# Patient Record
Sex: MALE | Race: WHITE | NOT HISPANIC OR LATINO | Employment: OTHER | URBAN - METROPOLITAN AREA
[De-identification: names, ages, dates, MRNs, and addresses within clinical notes are randomized per-mention and may not be internally consistent; named-entity substitution may affect disease eponyms.]

---

## 2019-01-23 ENCOUNTER — HOSPITAL ENCOUNTER (OUTPATIENT)
Dept: RADIOLOGY | Facility: MEDICAL CENTER | Age: 69
End: 2019-01-23
Attending: FAMILY MEDICINE
Payer: COMMERCIAL

## 2019-01-23 DIAGNOSIS — R41.841 COGNITIVE COMMUNICATION DEFICIT: ICD-10-CM

## 2019-01-23 PROCEDURE — 70551 MRI BRAIN STEM W/O DYE: CPT

## 2022-03-28 ENCOUNTER — APPOINTMENT (OUTPATIENT)
Dept: RADIOLOGY | Facility: MEDICAL CENTER | Age: 72
End: 2022-03-28
Attending: INTERNAL MEDICINE
Payer: COMMERCIAL

## 2023-05-15 ENCOUNTER — APPOINTMENT (OUTPATIENT)
Dept: RADIOLOGY | Facility: MEDICAL CENTER | Age: 73
DRG: 522 | End: 2023-05-15
Attending: EMERGENCY MEDICINE
Payer: COMMERCIAL

## 2023-05-15 ENCOUNTER — APPOINTMENT (OUTPATIENT)
Dept: CARDIOLOGY | Facility: MEDICAL CENTER | Age: 73
DRG: 522 | End: 2023-05-15
Attending: STUDENT IN AN ORGANIZED HEALTH CARE EDUCATION/TRAINING PROGRAM
Payer: COMMERCIAL

## 2023-05-15 ENCOUNTER — APPOINTMENT (OUTPATIENT)
Dept: RADIOLOGY | Facility: MEDICAL CENTER | Age: 73
DRG: 522 | End: 2023-05-15
Payer: COMMERCIAL

## 2023-05-15 ENCOUNTER — HOSPITAL ENCOUNTER (INPATIENT)
Facility: MEDICAL CENTER | Age: 73
LOS: 4 days | DRG: 522 | End: 2023-05-19
Attending: EMERGENCY MEDICINE | Admitting: STUDENT IN AN ORGANIZED HEALTH CARE EDUCATION/TRAINING PROGRAM
Payer: COMMERCIAL

## 2023-05-15 DIAGNOSIS — I44.0 PROLONGED P-R INTERVAL: ICD-10-CM

## 2023-05-15 DIAGNOSIS — R55 SYNCOPE, UNSPECIFIED SYNCOPE TYPE: ICD-10-CM

## 2023-05-15 DIAGNOSIS — S72.002A LEFT DISPLACED FEMORAL NECK FRACTURE (HCC): Primary | ICD-10-CM

## 2023-05-15 PROBLEM — G62.9 NEUROPATHY: Status: ACTIVE | Noted: 2023-05-15

## 2023-05-15 PROBLEM — D72.829 LEUKOCYTOSIS: Status: ACTIVE | Noted: 2023-05-15

## 2023-05-15 LAB
ALBUMIN SERPL BCP-MCNC: 4.3 G/DL (ref 3.2–4.9)
ALBUMIN/GLOB SERPL: 1.8 G/DL
ALP SERPL-CCNC: 73 U/L (ref 30–99)
ALT SERPL-CCNC: 29 U/L (ref 2–50)
ANION GAP SERPL CALC-SCNC: 13 MMOL/L (ref 7–16)
AST SERPL-CCNC: 28 U/L (ref 12–45)
BASOPHILS # BLD AUTO: 0.5 % (ref 0–1.8)
BASOPHILS # BLD: 0.06 K/UL (ref 0–0.12)
BILIRUB SERPL-MCNC: 1.8 MG/DL (ref 0.1–1.5)
BUN SERPL-MCNC: 20 MG/DL (ref 8–22)
CALCIUM ALBUM COR SERPL-MCNC: 9.1 MG/DL (ref 8.5–10.5)
CALCIUM SERPL-MCNC: 9.3 MG/DL (ref 8.5–10.5)
CHLORIDE SERPL-SCNC: 105 MMOL/L (ref 96–112)
CO2 SERPL-SCNC: 21 MMOL/L (ref 20–33)
CREAT SERPL-MCNC: 0.95 MG/DL (ref 0.5–1.4)
EKG IMPRESSION: NORMAL
EOSINOPHIL # BLD AUTO: 0.05 K/UL (ref 0–0.51)
EOSINOPHIL NFR BLD: 0.4 % (ref 0–6.9)
ERYTHROCYTE [DISTWIDTH] IN BLOOD BY AUTOMATED COUNT: 45.5 FL (ref 35.9–50)
GFR SERPLBLD CREATININE-BSD FMLA CKD-EPI: 84 ML/MIN/1.73 M 2
GLOBULIN SER CALC-MCNC: 2.4 G/DL (ref 1.9–3.5)
GLUCOSE SERPL-MCNC: 137 MG/DL (ref 65–99)
HCT VFR BLD AUTO: 49.8 % (ref 42–52)
HGB BLD-MCNC: 16.5 G/DL (ref 14–18)
IMM GRANULOCYTES # BLD AUTO: 0.09 K/UL (ref 0–0.11)
IMM GRANULOCYTES NFR BLD AUTO: 0.8 % (ref 0–0.9)
LYMPHOCYTES # BLD AUTO: 0.89 K/UL (ref 1–4.8)
LYMPHOCYTES NFR BLD: 7.7 % (ref 22–41)
MAGNESIUM SERPL-MCNC: 2.1 MG/DL (ref 1.5–2.5)
MCH RBC QN AUTO: 30.4 PG (ref 27–33)
MCHC RBC AUTO-ENTMCNC: 33.1 G/DL (ref 33.7–35.3)
MCV RBC AUTO: 91.9 FL (ref 81.4–97.8)
MONOCYTES # BLD AUTO: 0.85 K/UL (ref 0–0.85)
MONOCYTES NFR BLD AUTO: 7.3 % (ref 0–13.4)
NEUTROPHILS # BLD AUTO: 9.68 K/UL (ref 1.82–7.42)
NEUTROPHILS NFR BLD: 83.3 % (ref 44–72)
NRBC # BLD AUTO: 0 K/UL
NRBC BLD-RTO: 0 /100 WBC
NT-PROBNP SERPL IA-MCNC: 55 PG/ML (ref 0–125)
PLATELET # BLD AUTO: 175 K/UL (ref 164–446)
PMV BLD AUTO: 9.5 FL (ref 9–12.9)
POTASSIUM SERPL-SCNC: 4 MMOL/L (ref 3.6–5.5)
PROT SERPL-MCNC: 6.7 G/DL (ref 6–8.2)
RBC # BLD AUTO: 5.42 M/UL (ref 4.7–6.1)
SODIUM SERPL-SCNC: 139 MMOL/L (ref 135–145)
TROPONIN T SERPL-MCNC: 19 NG/L (ref 6–19)
TROPONIN T SERPL-MCNC: 22 NG/L (ref 6–19)
TROPONIN T SERPL-MCNC: 24 NG/L (ref 6–19)
WBC # BLD AUTO: 11.6 K/UL (ref 4.8–10.8)

## 2023-05-15 PROCEDURE — 83880 ASSAY OF NATRIURETIC PEPTIDE: CPT

## 2023-05-15 PROCEDURE — 80053 COMPREHEN METABOLIC PANEL: CPT

## 2023-05-15 PROCEDURE — 770020 HCHG ROOM/CARE - TELE (206)

## 2023-05-15 PROCEDURE — 93005 ELECTROCARDIOGRAM TRACING: CPT

## 2023-05-15 PROCEDURE — 85025 COMPLETE CBC W/AUTO DIFF WBC: CPT

## 2023-05-15 PROCEDURE — 73501 X-RAY EXAM HIP UNI 1 VIEW: CPT | Mod: LT

## 2023-05-15 PROCEDURE — 83735 ASSAY OF MAGNESIUM: CPT

## 2023-05-15 PROCEDURE — 700105 HCHG RX REV CODE 258: Performed by: EMERGENCY MEDICINE

## 2023-05-15 PROCEDURE — 700102 HCHG RX REV CODE 250 W/ 637 OVERRIDE(OP): Performed by: STUDENT IN AN ORGANIZED HEALTH CARE EDUCATION/TRAINING PROGRAM

## 2023-05-15 PROCEDURE — 96374 THER/PROPH/DIAG INJ IV PUSH: CPT

## 2023-05-15 PROCEDURE — 84484 ASSAY OF TROPONIN QUANT: CPT

## 2023-05-15 PROCEDURE — 99223 1ST HOSP IP/OBS HIGH 75: CPT | Mod: GC | Performed by: STUDENT IN AN ORGANIZED HEALTH CARE EDUCATION/TRAINING PROGRAM

## 2023-05-15 PROCEDURE — 71045 X-RAY EXAM CHEST 1 VIEW: CPT

## 2023-05-15 PROCEDURE — 96375 TX/PRO/DX INJ NEW DRUG ADDON: CPT

## 2023-05-15 PROCEDURE — 700105 HCHG RX REV CODE 258: Performed by: STUDENT IN AN ORGANIZED HEALTH CARE EDUCATION/TRAINING PROGRAM

## 2023-05-15 PROCEDURE — 36415 COLL VENOUS BLD VENIPUNCTURE: CPT

## 2023-05-15 PROCEDURE — 96376 TX/PRO/DX INJ SAME DRUG ADON: CPT

## 2023-05-15 PROCEDURE — 93005 ELECTROCARDIOGRAM TRACING: CPT | Performed by: EMERGENCY MEDICINE

## 2023-05-15 PROCEDURE — 700111 HCHG RX REV CODE 636 W/ 250 OVERRIDE (IP): Performed by: STUDENT IN AN ORGANIZED HEALTH CARE EDUCATION/TRAINING PROGRAM

## 2023-05-15 PROCEDURE — A9270 NON-COVERED ITEM OR SERVICE: HCPCS | Performed by: STUDENT IN AN ORGANIZED HEALTH CARE EDUCATION/TRAINING PROGRAM

## 2023-05-15 PROCEDURE — 99285 EMERGENCY DEPT VISIT HI MDM: CPT

## 2023-05-15 PROCEDURE — 700111 HCHG RX REV CODE 636 W/ 250 OVERRIDE (IP): Performed by: EMERGENCY MEDICINE

## 2023-05-15 RX ORDER — PREGABALIN 150 MG/1
150 CAPSULE ORAL 2 TIMES DAILY
Status: DISCONTINUED | OUTPATIENT
Start: 2023-05-15 | End: 2023-05-19 | Stop reason: HOSPADM

## 2023-05-15 RX ORDER — ACETAMINOPHEN 325 MG/1
650 TABLET ORAL EVERY 6 HOURS PRN
Status: DISCONTINUED | OUTPATIENT
Start: 2023-05-15 | End: 2023-05-15

## 2023-05-15 RX ORDER — HYDROMORPHONE HYDROCHLORIDE 1 MG/ML
0.5 INJECTION, SOLUTION INTRAMUSCULAR; INTRAVENOUS; SUBCUTANEOUS
Status: DISCONTINUED | OUTPATIENT
Start: 2023-05-15 | End: 2023-05-17

## 2023-05-15 RX ORDER — ALLOPURINOL 300 MG/1
150 TABLET ORAL DAILY
COMMUNITY

## 2023-05-15 RX ORDER — OXYCODONE HYDROCHLORIDE 10 MG/1
10 TABLET ORAL EVERY 6 HOURS PRN
COMMUNITY

## 2023-05-15 RX ORDER — AMOXICILLIN 250 MG
2 CAPSULE ORAL 2 TIMES DAILY
Status: DISCONTINUED | OUTPATIENT
Start: 2023-05-15 | End: 2023-05-19 | Stop reason: HOSPADM

## 2023-05-15 RX ORDER — MAGNESIUM OXIDE 420 MG/1
420 TABLET ORAL
COMMUNITY
End: 2023-07-11

## 2023-05-15 RX ORDER — ACETAMINOPHEN 500 MG
1000 TABLET ORAL EVERY 6 HOURS
Status: DISCONTINUED | OUTPATIENT
Start: 2023-05-16 | End: 2023-05-19 | Stop reason: HOSPADM

## 2023-05-15 RX ORDER — GABAPENTIN 100 MG/1
100 CAPSULE ORAL 2 TIMES DAILY PRN
Status: DISCONTINUED | OUTPATIENT
Start: 2023-05-15 | End: 2023-05-15

## 2023-05-15 RX ORDER — HYDROMORPHONE HYDROCHLORIDE 1 MG/ML
1 INJECTION, SOLUTION INTRAMUSCULAR; INTRAVENOUS; SUBCUTANEOUS ONCE
Status: COMPLETED | OUTPATIENT
Start: 2023-05-15 | End: 2023-05-15

## 2023-05-15 RX ORDER — HEPARIN SODIUM 5000 [USP'U]/ML
5000 INJECTION, SOLUTION INTRAVENOUS; SUBCUTANEOUS EVERY 8 HOURS
Status: DISCONTINUED | OUTPATIENT
Start: 2023-05-15 | End: 2023-05-17

## 2023-05-15 RX ORDER — LANOLIN ALCOHOL/MO/W.PET/CERES
2000 CREAM (GRAM) TOPICAL DAILY
COMMUNITY

## 2023-05-15 RX ORDER — GABAPENTIN 100 MG/1
100 CAPSULE ORAL 2 TIMES DAILY PRN
Status: DISCONTINUED | OUTPATIENT
Start: 2023-05-15 | End: 2023-05-19 | Stop reason: HOSPADM

## 2023-05-15 RX ORDER — POLYETHYLENE GLYCOL 3350 17 G/17G
1 POWDER, FOR SOLUTION ORAL
Status: DISCONTINUED | OUTPATIENT
Start: 2023-05-15 | End: 2023-05-19 | Stop reason: HOSPADM

## 2023-05-15 RX ORDER — ACETAMINOPHEN 500 MG
1000 TABLET ORAL EVERY 6 HOURS
Status: DISCONTINUED | OUTPATIENT
Start: 2023-05-15 | End: 2023-05-15

## 2023-05-15 RX ORDER — BISACODYL 10 MG
10 SUPPOSITORY, RECTAL RECTAL
Status: DISCONTINUED | OUTPATIENT
Start: 2023-05-15 | End: 2023-05-19 | Stop reason: HOSPADM

## 2023-05-15 RX ORDER — SODIUM CHLORIDE, SODIUM LACTATE, POTASSIUM CHLORIDE, CALCIUM CHLORIDE 600; 310; 30; 20 MG/100ML; MG/100ML; MG/100ML; MG/100ML
1000 INJECTION, SOLUTION INTRAVENOUS ONCE
Status: COMPLETED | OUTPATIENT
Start: 2023-05-15 | End: 2023-05-15

## 2023-05-15 RX ORDER — ONDANSETRON 4 MG/1
4 TABLET, ORALLY DISINTEGRATING ORAL EVERY 4 HOURS PRN
Status: DISCONTINUED | OUTPATIENT
Start: 2023-05-15 | End: 2023-05-19 | Stop reason: HOSPADM

## 2023-05-15 RX ORDER — ACETAMINOPHEN 500 MG
1000 TABLET ORAL EVERY 6 HOURS PRN
Status: DISCONTINUED | OUTPATIENT
Start: 2023-05-21 | End: 2023-05-19 | Stop reason: HOSPADM

## 2023-05-15 RX ORDER — ALLOPURINOL 300 MG/1
150 TABLET ORAL DAILY
Status: DISCONTINUED | OUTPATIENT
Start: 2023-05-16 | End: 2023-05-19 | Stop reason: HOSPADM

## 2023-05-15 RX ORDER — HYDRALAZINE HYDROCHLORIDE 20 MG/ML
20 INJECTION INTRAMUSCULAR; INTRAVENOUS EVERY 6 HOURS PRN
Status: DISCONTINUED | OUTPATIENT
Start: 2023-05-15 | End: 2023-05-19 | Stop reason: HOSPADM

## 2023-05-15 RX ORDER — FENOFIBRATE 145 MG/1
145 TABLET, COATED ORAL DAILY
COMMUNITY
End: 2023-06-23

## 2023-05-15 RX ORDER — ERGOCALCIFEROL 1.25 MG/1
50000 CAPSULE ORAL
COMMUNITY

## 2023-05-15 RX ORDER — OXYCODONE HYDROCHLORIDE 5 MG/1
5 TABLET ORAL
Status: DISCONTINUED | OUTPATIENT
Start: 2023-05-15 | End: 2023-05-17

## 2023-05-15 RX ORDER — SODIUM CHLORIDE, SODIUM LACTATE, POTASSIUM CHLORIDE, CALCIUM CHLORIDE 600; 310; 30; 20 MG/100ML; MG/100ML; MG/100ML; MG/100ML
INJECTION, SOLUTION INTRAVENOUS CONTINUOUS
Status: DISCONTINUED | OUTPATIENT
Start: 2023-05-15 | End: 2023-05-16

## 2023-05-15 RX ORDER — ZOLPIDEM TARTRATE 10 MG/1
10 TABLET ORAL NIGHTLY PRN
Status: ON HOLD | COMMUNITY
End: 2023-05-19

## 2023-05-15 RX ORDER — ACETAMINOPHEN 500 MG
1000 TABLET ORAL EVERY 6 HOURS PRN
Status: DISCONTINUED | OUTPATIENT
Start: 2023-05-20 | End: 2023-05-15

## 2023-05-15 RX ORDER — PREGABALIN 150 MG/1
300 CAPSULE ORAL NIGHTLY
COMMUNITY

## 2023-05-15 RX ORDER — OXYCODONE HYDROCHLORIDE 10 MG/1
10 TABLET ORAL
Status: DISCONTINUED | OUTPATIENT
Start: 2023-05-15 | End: 2023-05-17

## 2023-05-15 RX ORDER — ONDANSETRON 2 MG/ML
4 INJECTION INTRAMUSCULAR; INTRAVENOUS EVERY 4 HOURS PRN
Status: DISCONTINUED | OUTPATIENT
Start: 2023-05-15 | End: 2023-05-19 | Stop reason: HOSPADM

## 2023-05-15 RX ADMIN — ONDANSETRON 4 MG: 2 INJECTION INTRAMUSCULAR; INTRAVENOUS at 19:19

## 2023-05-15 RX ADMIN — HYDROMORPHONE HYDROCHLORIDE 1 MG: 1 INJECTION, SOLUTION INTRAMUSCULAR; INTRAVENOUS; SUBCUTANEOUS at 17:02

## 2023-05-15 RX ADMIN — HYDROMORPHONE HYDROCHLORIDE 1 MG: 1 INJECTION, SOLUTION INTRAMUSCULAR; INTRAVENOUS; SUBCUTANEOUS at 18:05

## 2023-05-15 RX ADMIN — PREGABALIN 150 MG: 150 CAPSULE ORAL at 22:19

## 2023-05-15 RX ADMIN — OXYCODONE HYDROCHLORIDE 10 MG: 10 TABLET ORAL at 19:21

## 2023-05-15 RX ADMIN — SODIUM CHLORIDE, POTASSIUM CHLORIDE, SODIUM LACTATE AND CALCIUM CHLORIDE 1000 ML: 600; 310; 30; 20 INJECTION, SOLUTION INTRAVENOUS at 17:03

## 2023-05-15 RX ADMIN — SODIUM CHLORIDE, POTASSIUM CHLORIDE, SODIUM LACTATE AND CALCIUM CHLORIDE: 600; 310; 30; 20 INJECTION, SOLUTION INTRAVENOUS at 19:21

## 2023-05-15 RX ADMIN — SODIUM CHLORIDE, POTASSIUM CHLORIDE, SODIUM LACTATE AND CALCIUM CHLORIDE: 600; 310; 30; 20 INJECTION, SOLUTION INTRAVENOUS at 22:43

## 2023-05-15 RX ADMIN — HEPARIN SODIUM 5000 UNITS: 5000 INJECTION, SOLUTION INTRAVENOUS; SUBCUTANEOUS at 22:19

## 2023-05-15 ASSESSMENT — ENCOUNTER SYMPTOMS
FEVER: 0
CONSTIPATION: 0
DIARRHEA: 0
NERVOUS/ANXIOUS: 0
HEADACHES: 0
DIZZINESS: 1
ABDOMINAL PAIN: 0
PALPITATIONS: 1
WEIGHT LOSS: 0
COUGH: 0
VOMITING: 0
CHILLS: 0
DEPRESSION: 0
BACK PAIN: 1
FOCAL WEAKNESS: 0
BRUISES/BLEEDS EASILY: 0
NECK PAIN: 0
DOUBLE VISION: 0
NAUSEA: 0
WEAKNESS: 0
SHORTNESS OF BREATH: 0
BLURRED VISION: 0
TINGLING: 1

## 2023-05-15 ASSESSMENT — LIFESTYLE VARIABLES
HAVE PEOPLE ANNOYED YOU BY CRITICIZING YOUR DRINKING: NO
EVER FELT BAD OR GUILTY ABOUT YOUR DRINKING: NO
HAVE YOU EVER FELT YOU SHOULD CUT DOWN ON YOUR DRINKING: NO
AVERAGE NUMBER OF DAYS PER WEEK YOU HAVE A DRINK CONTAINING ALCOHOL: 0
EVER HAD A DRINK FIRST THING IN THE MORNING TO STEADY YOUR NERVES TO GET RID OF A HANGOVER: NO
CONSUMPTION TOTAL: NEGATIVE
ALCOHOL_USE: NO
TOTAL SCORE: 0
ON A TYPICAL DAY WHEN YOU DRINK ALCOHOL HOW MANY DRINKS DO YOU HAVE: 0
HOW MANY TIMES IN THE PAST YEAR HAVE YOU HAD 5 OR MORE DRINKS IN A DAY: 0
TOTAL SCORE: 0
TOTAL SCORE: 0

## 2023-05-15 ASSESSMENT — COGNITIVE AND FUNCTIONAL STATUS - GENERAL
PERSONAL GROOMING: A LOT
CLIMB 3 TO 5 STEPS WITH RAILING: A LOT
TURNING FROM BACK TO SIDE WHILE IN FLAT BAD: A LOT
MOBILITY SCORE: 12
STANDING UP FROM CHAIR USING ARMS: A LOT
MOVING FROM LYING ON BACK TO SITTING ON SIDE OF FLAT BED: A LOT
EATING MEALS: A LOT
SUGGESTED CMS G CODE MODIFIER MOBILITY: CL
HELP NEEDED FOR BATHING: A LOT
MOVING TO AND FROM BED TO CHAIR: A LOT
TOILETING: A LOT
DAILY ACTIVITIY SCORE: 12
SUGGESTED CMS G CODE MODIFIER DAILY ACTIVITY: CL
DRESSING REGULAR UPPER BODY CLOTHING: A LOT
WALKING IN HOSPITAL ROOM: A LOT
DRESSING REGULAR LOWER BODY CLOTHING: A LOT

## 2023-05-15 ASSESSMENT — PATIENT HEALTH QUESTIONNAIRE - PHQ9
1. LITTLE INTEREST OR PLEASURE IN DOING THINGS: NOT AT ALL
2. FEELING DOWN, DEPRESSED, IRRITABLE, OR HOPELESS: NOT AT ALL
SUM OF ALL RESPONSES TO PHQ9 QUESTIONS 1 AND 2: 0

## 2023-05-15 ASSESSMENT — FIBROSIS 4 INDEX: FIB4 SCORE: 2.2

## 2023-05-15 NOTE — ED PROVIDER NOTES
"  ER Provider Note    Scribed for Guanaco Kay Ii, M.d. by Danielle Sloan. 5/15/2023  4:56 PM    Primary Care Provider: Pcp Pt States None    CHIEF COMPLAINT  Chief Complaint   Patient presents with    Syncope     Pt recently diagnosed w/ afib w/ frequent dizzy spells. Today he had a syncopal event while hosing down his house w/ +LOC but denies hitting his head. -blood thinners    Hip Pain     L hip pain s/t fall with shortening, in position of comfort. CMS intact     EXTERNAL RECORDS REVIEWED  Last hospitalization at our facility 9/2011 for near syncope    HPI/ROS  LIMITATION TO HISTORY   Select: : None  OUTSIDE HISTORIAN(S):  None    Andreas Ortega is a 72 y.o. male with history of syncope, borderline diabetes, hypertension who presents to the ED with hip pain and loss of consciousness. Andreas reports that he was washing his windows when he \"blacked out and fell.\" He states he was having repeated spells of dizziness while cleaning his windows and had to sit down frequently to stop himself from falling. He notes that he is unable to straighten his leg due to the pain. He denies hitting his head, fever, diarrhea, vomiting or chills.  He adds that he has been diagnosed with A-fib, but was not put on any medications besides a daily dose of Magnesium and fish oil. [facility] also adds [facility], \"[facility] not [facility].\"    PAST MEDICAL HISTORY  Past Medical History:   Diagnosis Date    Arthritis     hands, knees, shoulders    Backpain     Cancer (HCC)     prostectomy, R kidney removal, skin    Diabetes     borderline    Fall     3 days ago    Hypertension     Prostate cancer (HCC)     Renal cancer (HCC)     Skin cancer        SURGICAL HISTORY  Past Surgical History:   Procedure Laterality Date    GASTROSCOPY WITH BIOPSY  1/23/2012    Performed by OBDULIO ALEJANDRA at [facility] ORS    EGD W/ENDOSCOPIC ULTRASOUND  1/23/2012    Performed by OBDULIO ALEJANDRA at [facility] ORS    ERCP " "W/SPHINCTEROTOMY/PAPILL.  1/23/2012    Performed by OBDULIO ALEJANDRA at SURGERY [facility] ORS    ERCP W/REMOVAL CALCULUS  1/23/2012    Performed by OBDULIO ALEJANDRA at SURGERY [facility]    PROSTATECTOMY, RADIAL  2005    OTHER  1984    Right kidney removed    OTHER ORTHOPEDIC SURGERY  1974    R knee surgery, repair    APPENDECTOMY  1966       FAMILY HISTORY  Family History   Problem Relation Age of Onset    Cancer Unknown     Diabetes Unknown     Hypertension Unknown     Heart Disease Unknown     Stroke Unknown        SOCIAL HISTORY   reports that he has never smoked. He does not have any smokeless tobacco history on file. He reports that he does not drink alcohol and does not use drugs.    CURRENT MEDICATIONS  Previous Medications    ALLOPURINOL (ZYLOPRIM) 300 MG TAB    Take 150 mg by mouth every day.    CYANOCOBALAMIN (VITAMIN B-12) 1000 MCG TAB    Take 2,000 mcg by mouth every day.    FENOFIBRATE (TRICOR) 145 MG TAB    Take 145 mg by mouth every day.    HYDROCHLOROTHIAZIDE (HYDRODIURIL) 12.5 MG TABLET    Take 12.5 mg by mouth every morning.    MAGNESIUM OXIDE 420 MG TAB    Take 420 mg by mouth every day.    NIFEDIPINE (ADALAT CC) 30 MG CR TABLET    Take 30 mg by mouth every day.    OXYCODONE IMMEDIATE RELEASE (ROXICODONE) 10 MG IMMEDIATE RELEASE TABLET    Take 10 mg by mouth every 6 hours as needed (for pain).    PREGABALIN (LYRICA) 150 MG CAP    Take 150 mg by mouth 2 times a day.    VITAMIN D2, ERGOCALCIFEROL, (DRISDOL) 1.25 MG (68427 UT) CAP CAPSULE    Take 50,000 Units by mouth every 7 days.    ZOLPIDEM (AMBIEN) 10 MG TAB    Take 10 mg by mouth at bedtime as needed for Sleep.       ALLERGIES  Crestor [rosuvastatin calcium], Levaquin, Metformin, Naproxen, Pcn [penicillins], Pravastatin, Simvastatin, and Statins [hmg-coa-r inhibitors]    PHYSICAL EXAM  BP (!) 128/94   Pulse 66   Temp 36.4 °C (97.5 °F) (Temporal)   Resp 18   Ht 1.905 m (6' 3\")   Wt 104 kg (230 lb)   SpO2 95%   BMI 28.75 kg/m² "   Physical Exam  Vitals and nursing note reviewed.   Constitutional:       Comments: 72 year old man supine on Ed bed with left hip in flexed position   HENT:      Head: Normocephalic and atraumatic.      Nose: No congestion.      Mouth/Throat:      Mouth: Mucous membranes are moist.   Eyes:      Pupils: Pupils are equal, round, and reactive to light.   Cardiovascular:      Rate and Rhythm: Normal rate. Rhythm irregular.   Pulmonary:      Effort: Pulmonary effort is normal. No respiratory distress.      Breath sounds: Normal breath sounds.   Abdominal:      Tenderness: There is no abdominal tenderness.   Musculoskeletal:      Cervical back: Normal range of motion.      Comments: Flexed at left hip, unable to extend due to pain. Normal sensation distally. Normal strength at left foot. Pulses equal DP/PT bilaterally.    Skin:     General: Skin is warm.   Neurological:      Mental Status: He is alert.      Comments: AOX4. Normal speech. No aphasia. No facial droop. See msk exam.    Psychiatric:         Mood and Affect: Mood normal.           DIAGNOSTIC STUDIES    Labs:   Results for orders placed or performed during the hospital encounter of 05/15/23   CBC with Differential   Result Value Ref Range    WBC 11.6 (H) 4.8 - 10.8 K/uL    RBC 5.42 4.70 - 6.10 M/uL    Hemoglobin 16.5 14.0 - 18.0 g/dL    Hematocrit 49.8 42.0 - 52.0 %    MCV 91.9 81.4 - 97.8 fL    MCH 30.4 27.0 - 33.0 pg    MCHC 33.1 (L) 33.7 - 35.3 g/dL    RDW 45.5 35.9 - 50.0 fL    Platelet Count 175 164 - 446 K/uL    MPV 9.5 9.0 - 12.9 fL    Neutrophils-Polys 83.30 (H) 44.00 - 72.00 %    Lymphocytes 7.70 (L) 22.00 - 41.00 %    Monocytes 7.30 0.00 - 13.40 %    Eosinophils 0.40 0.00 - 6.90 %    Basophils 0.50 0.00 - 1.80 %    Immature Granulocytes 0.80 0.00 - 0.90 %    Nucleated RBC 0.00 /100 WBC    Neutrophils (Absolute) 9.68 (H) 1.82 - 7.42 K/uL    Lymphs (Absolute) 0.89 (L) 1.00 - 4.80 K/uL    Monos (Absolute) 0.85 0.00 - 0.85 K/uL    Eos (Absolute) 0.05  0.00 - 0.51 K/uL    Baso (Absolute) 0.06 0.00 - 0.12 K/uL    Immature Granulocytes (abs) 0.09 0.00 - 0.11 K/uL    NRBC (Absolute) 0.00 K/uL   Complete Metabolic Panel (CMP)   Result Value Ref Range    Sodium 139 135 - 145 mmol/L    Potassium 4.0 3.6 - 5.5 mmol/L    Chloride 105 96 - 112 mmol/L    Co2 21 20 - 33 mmol/L    Anion Gap 13.0 7.0 - 16.0    Glucose 137 (H) 65 - 99 mg/dL    Bun 20 8 - 22 mg/dL    Creatinine 0.95 0.50 - 1.40 mg/dL    Calcium 9.3 8.5 - 10.5 mg/dL    AST(SGOT) 28 12 - 45 U/L    ALT(SGPT) 29 2 - 50 U/L    Alkaline Phosphatase 73 30 - 99 U/L    Total Bilirubin 1.8 (H) 0.1 - 1.5 mg/dL    Albumin 4.3 3.2 - 4.9 g/dL    Total Protein 6.7 6.0 - 8.2 g/dL    Globulin 2.4 1.9 - 3.5 g/dL    A-G Ratio 1.8 g/dL   Troponins NOW   Result Value Ref Range    Troponin T 24 (H) 6 - 19 ng/L   Troponins in two (2) hours   Result Value Ref Range    Troponin T 19 6 - 19 ng/L   MAGNESIUM   Result Value Ref Range    Magnesium 2.1 1.5 - 2.5 mg/dL   CORRECTED CALCIUM   Result Value Ref Range    Correct Calcium 9.1 8.5 - 10.5 mg/dL   ESTIMATED GFR   Result Value Ref Range    GFR (CKD-EPI) 84 >60 mL/min/1.73 m 2   proBrain Natriuretic Peptide, NT   Result Value Ref Range    NT-proBNP 55 0 - 125 pg/mL   TROPONIN   Result Value Ref Range    Troponin T 22 (H) 6 - 19 ng/L   EKG   Result Value Ref Range    Report       [facility] Emergency Dept.    Test Date:  2023-05-15  Pt Name:    AMADO BEDOLLA                   Department: ER  MRN:        [id number]                      Room:       TRAUMA - EXAM 1  Gender:     Male                         Technician: [id number]  :        1950                   Requested By:ER TRIAGE PROTOCOL  Order #:    [id number]                    Reading MD: Guanaco Kay II, MD    Measurements  Intervals                                Axis  Rate:       63                           P:          -77  WY:         426                          QRS:        -24  QRSD:       170                           T:          -21  QT:         461  QTc:        472    Interpretive Statements  Very prolonged NV interval 426. 1st degree AV block  Consider left atrial enlargement  Right bundle branch block  No ST elevation or depression  Impression: Prolonged NV interval or possible U wave (has had this prolonged  NV  interval before) RBBB. No ST segment changes.  Compared to [facility] 09/15/2011 13:53:50  Ectopic atrial rhythm now present  Right bundle-branch block now present  Sinus rhythm no longer present  Electronically Signed On 5- 17:52:04 PDT by Guanaco Kay II, MD          EKG:   I have independently interpreted this EKG  Results for orders placed or performed during the hospital encounter of 05/15/23   EKG   Result Value Ref Range    Report       [facility] Emergency Dept.    Test Date:  2023-05-15  Pt Name:    AMADO BEDOLLA                   Department: ER  MRN:        [id number]                      Room:       TRAUMA - EXAM 1  Gender:     Male                         Technician: [id number]  :        1950                   Requested By:ER TRIAGE PROTOCOL  Order #:    [id number]                    Reading MD: Guanaco Kay II, MD    Measurements  Intervals                                Axis  Rate:       63                           P:          -77  NV:         426                          QRS:        -24  QRSD:       170                          T:          -21  QT:         461  QTc:        472    Interpretive Statements  Very prolonged NV interval 426. 1st degree AV block  Consider left atrial enlargement  Right bundle branch block  No ST elevation or depression  Impression: Prolonged NV interval or possible U wave (has had this prolonged  NV  interval before) RBBB. No ST segment changes.  Compared to [facility] 09/15/2011 13:53:50  Ectopic atrial rhythm now present  Right bundle-branch block now present  Sinus rhythm no longer present  Electronically Signed On 5- 17:52:04 PDT  by Guanaco Kay II, MD           Radiology:   The attending emergency physician has independently interpreted the diagnostic imaging associated with this visit and am waiting the final reading from the radiologist.   Preliminary interpretation is a follows: Reviewed Hip xray and femoral neck is fractured  Radiologist interpretation:   EC-ECHOCARDIOGRAM COMPLETE W/ CONT         DX-CHEST-PORTABLE (1 VIEW)   Final Result      1.  Low lung volumes.   2.  Hypoventilatory changes versus interstitial infiltrates and/or edema.   3.  Cardiomegaly.      DX-HIP-UNILATERAL-WITH PELVIS-1 VIEW LEFT   Final Result         Acute comminuted and displaced fracture of the left femoral neck.           COURSE & MEDICAL DECISION MAKING     ED Observation Status? Yes; I am placing the patient in to an observation status due to a diagnostic uncertainty as well as therapeutic intensity. Patient placed in observation status at 4:58 PM, 5/15/2023.     Observation plan is as follows: XR, serum studies, may need joint reduction    Upon Reevaluation, the patient's condition has: not improved; and will be escalated to hospitalization.    Patient discharged from ED Observation status at 6:59 (Time) (5/15/2023)  (Date).       INITIAL ASSESSMENT, COURSE AND PLAN  Care Narrative:   This is an emergent evaluation of a 72 year old man who presents with concerns of syncopal episode then subsequent left hip pain, unable to ambulate. Concerns for possible arrhythmia causing syncopal episode and now with either left hip dislocation or fracture. Cardiac work up initiated. XR left hip pending. IV fluids started and pain medication ordered.      I have ordered continuous pulse ox and cardiac monitoring. There are abnormalities. Pulse ox shows a normal waveform with saturations of 95% on room air. Cardiac monitors show a lot of artifact but QRS complexes look to be normal rate, irregular rhythm. Difficult to tell with so much artifact.     5:12 PM -  Patient seen and examined at bedside. Discussed plan of care, including labs and imaging. Patient agrees to the plan of care. The patient will be medicated with Dilaudid injection 1 mg. Ordered for EKG, Troponins, CMP, CBC w/ Diff, DX-Chest, and CX-Hip unilateral with pelvis to evaluate his symptoms.      6:21 PM  XR consistent with left femoral neck fracture.  I spoke with Dr. Carrillo, Orthopedics. Agrees with plan to admit to hospitalist service. Plan for OR tomorrow morning if medically cleared.     6:59 PM   I reevaluated the patient at bedside. The patient informs me they feel improved following pain medication.  Discussed finding of fracture and need for admission, surgery and further work-up for syncopal episode.  He has a very long MA interval and typically this is benign but I believe he does need further work-up because he is having syncopal episodes.  Troponin 22, minimally elevated.  Heart score is 5.  He has no chest pain and I think MI is unlikely.  Chest x-ray without any focal pneumonia, pneumothorax, rib fractures.       PROBLEM LIST  #Syncope   -Very prolonged MA interval, syncope could be cardiac related    #Left femoral neck fracture   -N.p.o. after midnight, anticipate surgery tomorrow if medically cleared    DISPOSITION AND DISCUSSIONS  I have discussed management of the patient with the following physicians and LILLIANA's:  Dr. Solis (hospitalist), Dr. Carrillo (ortho)      Barriers to care at this time, including but not limited to: Patient does not have established PCP.     DISPOSITION:  Patient will be hospitalized by Dr. Solis in guarded condition.      FINAL DIANGOSIS  1. Left displaced femoral neck fracture (HCC)    2. Syncope, unspecified syncope type    3. Prolonged P-R interval       The note accurately reflects work and decisions made by me.  Guanaco Kay II, M.D.  5/16/2023  1:41 AM

## 2023-05-15 NOTE — ED TRIAGE NOTES
Chief Complaint   Patient presents with    Syncope     Pt recently diagnosed w/ afib w/ frequent dizzy spells. Today he had a syncopal event while hosing down his house w/ +LOC but denies hitting his head. -blood thinners    Hip Pain     L hip pain s/t fall with shortening, in position of comfort. CMS intact     Pt BIB EMS from home following above incident. HE was in afib RVR wuth rate in 160s on scene but self converted en route. Received 150 of fentanyl and 4 of zofran by EMS

## 2023-05-16 ENCOUNTER — ANESTHESIA (OUTPATIENT)
Dept: SURGERY | Facility: MEDICAL CENTER | Age: 73
DRG: 522 | End: 2023-05-16
Payer: COMMERCIAL

## 2023-05-16 ENCOUNTER — ANESTHESIA EVENT (OUTPATIENT)
Dept: SURGERY | Facility: MEDICAL CENTER | Age: 73
DRG: 522 | End: 2023-05-16
Payer: COMMERCIAL

## 2023-05-16 ENCOUNTER — APPOINTMENT (OUTPATIENT)
Dept: RADIOLOGY | Facility: MEDICAL CENTER | Age: 73
DRG: 522 | End: 2023-05-16
Payer: COMMERCIAL

## 2023-05-16 PROBLEM — M80.052A PATHOLOGICAL FRACTURE OF LEFT FEMUR DUE TO OSTEOPOROSIS (HCC): Status: ACTIVE | Noted: 2023-05-15

## 2023-05-16 PROBLEM — I44.0 FIRST DEGREE HEART BLOCK: Status: ACTIVE | Noted: 2023-05-16

## 2023-05-16 LAB
ALBUMIN SERPL BCP-MCNC: 3.9 G/DL (ref 3.2–4.9)
ALBUMIN/GLOB SERPL: 1.8 G/DL
ALP SERPL-CCNC: 67 U/L (ref 30–99)
ALT SERPL-CCNC: 28 U/L (ref 2–50)
ANION GAP SERPL CALC-SCNC: 13 MMOL/L (ref 7–16)
AST SERPL-CCNC: 25 U/L (ref 12–45)
BILIRUB SERPL-MCNC: 2 MG/DL (ref 0.1–1.5)
BUN SERPL-MCNC: 18 MG/DL (ref 8–22)
CALCIUM ALBUM COR SERPL-MCNC: 9.2 MG/DL (ref 8.5–10.5)
CALCIUM SERPL-MCNC: 9.1 MG/DL (ref 8.5–10.5)
CHLORIDE SERPL-SCNC: 100 MMOL/L (ref 96–112)
CO2 SERPL-SCNC: 26 MMOL/L (ref 20–33)
CREAT SERPL-MCNC: 0.89 MG/DL (ref 0.5–1.4)
ERYTHROCYTE [DISTWIDTH] IN BLOOD BY AUTOMATED COUNT: 45.6 FL (ref 35.9–50)
GFR SERPLBLD CREATININE-BSD FMLA CKD-EPI: 90 ML/MIN/1.73 M 2
GLOBULIN SER CALC-MCNC: 2.2 G/DL (ref 1.9–3.5)
GLUCOSE BLD STRIP.AUTO-MCNC: 144 MG/DL (ref 65–99)
GLUCOSE SERPL-MCNC: 122 MG/DL (ref 65–99)
HCT VFR BLD AUTO: 48.7 % (ref 42–52)
HGB BLD-MCNC: 15.9 G/DL (ref 14–18)
INR PPP: 1.1 (ref 0.87–1.13)
LV EJECT FRACT  99904: 65
LV EJECT FRACT MOD 4C 99902: 67.74
MCH RBC QN AUTO: 30.2 PG (ref 27–33)
MCHC RBC AUTO-ENTMCNC: 32.6 G/DL (ref 33.7–35.3)
MCV RBC AUTO: 92.4 FL (ref 81.4–97.8)
PLATELET # BLD AUTO: 182 K/UL (ref 164–446)
PMV BLD AUTO: 9.5 FL (ref 9–12.9)
POTASSIUM SERPL-SCNC: 4.9 MMOL/L (ref 3.6–5.5)
PROT SERPL-MCNC: 6.1 G/DL (ref 6–8.2)
PROTHROMBIN TIME: 14.1 SEC (ref 12–14.6)
RBC # BLD AUTO: 5.27 M/UL (ref 4.7–6.1)
SODIUM SERPL-SCNC: 139 MMOL/L (ref 135–145)
WBC # BLD AUTO: 10.5 K/UL (ref 4.8–10.8)

## 2023-05-16 PROCEDURE — 85025 COMPLETE CBC W/AUTO DIFF WBC: CPT

## 2023-05-16 PROCEDURE — 700111 HCHG RX REV CODE 636 W/ 250 OVERRIDE (IP): Performed by: STUDENT IN AN ORGANIZED HEALTH CARE EDUCATION/TRAINING PROGRAM

## 2023-05-16 PROCEDURE — 94760 N-INVAS EAR/PLS OXIMETRY 1: CPT

## 2023-05-16 PROCEDURE — 160041 HCHG SURGERY MINUTES - EA ADDL 1 MIN LEVEL 4: Performed by: STUDENT IN AN ORGANIZED HEALTH CARE EDUCATION/TRAINING PROGRAM

## 2023-05-16 PROCEDURE — 160035 HCHG PACU - 1ST 60 MINS PHASE I: Performed by: STUDENT IN AN ORGANIZED HEALTH CARE EDUCATION/TRAINING PROGRAM

## 2023-05-16 PROCEDURE — 700102 HCHG RX REV CODE 250 W/ 637 OVERRIDE(OP): Performed by: ANESTHESIOLOGY

## 2023-05-16 PROCEDURE — 160002 HCHG RECOVERY MINUTES (STAT): Performed by: STUDENT IN AN ORGANIZED HEALTH CARE EDUCATION/TRAINING PROGRAM

## 2023-05-16 PROCEDURE — 0SRS019 REPLACEMENT OF LEFT HIP JOINT, FEMORAL SURFACE WITH METAL SYNTHETIC SUBSTITUTE, CEMENTED, OPEN APPROACH: ICD-10-PCS | Performed by: STUDENT IN AN ORGANIZED HEALTH CARE EDUCATION/TRAINING PROGRAM

## 2023-05-16 PROCEDURE — 700101 HCHG RX REV CODE 250: Performed by: ANESTHESIOLOGY

## 2023-05-16 PROCEDURE — 85027 COMPLETE CBC AUTOMATED: CPT

## 2023-05-16 PROCEDURE — 99100 ANES PT EXTEME AGE<1 YR&>70: CPT | Performed by: ANESTHESIOLOGY

## 2023-05-16 PROCEDURE — 71045 X-RAY EXAM CHEST 1 VIEW: CPT

## 2023-05-16 PROCEDURE — 93306 TTE W/DOPPLER COMPLETE: CPT

## 2023-05-16 PROCEDURE — 700102 HCHG RX REV CODE 250 W/ 637 OVERRIDE(OP): Performed by: STUDENT IN AN ORGANIZED HEALTH CARE EDUCATION/TRAINING PROGRAM

## 2023-05-16 PROCEDURE — 83735 ASSAY OF MAGNESIUM: CPT

## 2023-05-16 PROCEDURE — 700111 HCHG RX REV CODE 636 W/ 250 OVERRIDE (IP): Performed by: ANESTHESIOLOGY

## 2023-05-16 PROCEDURE — 82533 TOTAL CORTISOL: CPT

## 2023-05-16 PROCEDURE — 93306 TTE W/DOPPLER COMPLETE: CPT | Mod: 26 | Performed by: INTERNAL MEDICINE

## 2023-05-16 PROCEDURE — 27236 TREAT THIGH FRACTURE: CPT | Mod: 80ROC,LT | Performed by: STUDENT IN AN ORGANIZED HEALTH CARE EDUCATION/TRAINING PROGRAM

## 2023-05-16 PROCEDURE — A9270 NON-COVERED ITEM OR SERVICE: HCPCS | Performed by: STUDENT IN AN ORGANIZED HEALTH CARE EDUCATION/TRAINING PROGRAM

## 2023-05-16 PROCEDURE — 160009 HCHG ANES TIME/MIN: Performed by: STUDENT IN AN ORGANIZED HEALTH CARE EDUCATION/TRAINING PROGRAM

## 2023-05-16 PROCEDURE — 502000 HCHG MISC OR IMPLANTS RC 0278: Performed by: STUDENT IN AN ORGANIZED HEALTH CARE EDUCATION/TRAINING PROGRAM

## 2023-05-16 PROCEDURE — C1776 JOINT DEVICE (IMPLANTABLE): HCPCS | Performed by: STUDENT IN AN ORGANIZED HEALTH CARE EDUCATION/TRAINING PROGRAM

## 2023-05-16 PROCEDURE — A9270 NON-COVERED ITEM OR SERVICE: HCPCS | Performed by: ANESTHESIOLOGY

## 2023-05-16 PROCEDURE — C1713 ANCHOR/SCREW BN/BN,TIS/BN: HCPCS | Performed by: STUDENT IN AN ORGANIZED HEALTH CARE EDUCATION/TRAINING PROGRAM

## 2023-05-16 PROCEDURE — 27236 TREAT THIGH FRACTURE: CPT | Mod: LT | Performed by: STUDENT IN AN ORGANIZED HEALTH CARE EDUCATION/TRAINING PROGRAM

## 2023-05-16 PROCEDURE — 160048 HCHG OR STATISTICAL LEVEL 1-5: Performed by: STUDENT IN AN ORGANIZED HEALTH CARE EDUCATION/TRAINING PROGRAM

## 2023-05-16 PROCEDURE — 84145 PROCALCITONIN (PCT): CPT

## 2023-05-16 PROCEDURE — 01214 ANES OPEN PX TOT HIP ARTHRP: CPT | Performed by: ANESTHESIOLOGY

## 2023-05-16 PROCEDURE — 80069 RENAL FUNCTION PANEL: CPT

## 2023-05-16 PROCEDURE — 80053 COMPREHEN METABOLIC PANEL: CPT

## 2023-05-16 PROCEDURE — 160029 HCHG SURGERY MINUTES - 1ST 30 MINS LEVEL 4: Performed by: STUDENT IN AN ORGANIZED HEALTH CARE EDUCATION/TRAINING PROGRAM

## 2023-05-16 PROCEDURE — 99222 1ST HOSP IP/OBS MODERATE 55: CPT | Mod: 57 | Performed by: STUDENT IN AN ORGANIZED HEALTH CARE EDUCATION/TRAINING PROGRAM

## 2023-05-16 PROCEDURE — 36415 COLL VENOUS BLD VENIPUNCTURE: CPT

## 2023-05-16 PROCEDURE — 85610 PROTHROMBIN TIME: CPT

## 2023-05-16 PROCEDURE — A9270 NON-COVERED ITEM OR SERVICE: HCPCS | Performed by: INTERNAL MEDICINE

## 2023-05-16 PROCEDURE — 700102 HCHG RX REV CODE 250 W/ 637 OVERRIDE(OP): Performed by: INTERNAL MEDICINE

## 2023-05-16 PROCEDURE — 82962 GLUCOSE BLOOD TEST: CPT

## 2023-05-16 PROCEDURE — 700111 HCHG RX REV CODE 636 W/ 250 OVERRIDE (IP)

## 2023-05-16 PROCEDURE — 700117 HCHG RX CONTRAST REV CODE 255: Performed by: STUDENT IN AN ORGANIZED HEALTH CARE EDUCATION/TRAINING PROGRAM

## 2023-05-16 PROCEDURE — 83605 ASSAY OF LACTIC ACID: CPT

## 2023-05-16 PROCEDURE — 700105 HCHG RX REV CODE 258: Performed by: ANESTHESIOLOGY

## 2023-05-16 PROCEDURE — 770020 HCHG ROOM/CARE - TELE (206)

## 2023-05-16 PROCEDURE — 99232 SBSQ HOSP IP/OBS MODERATE 35: CPT | Performed by: INTERNAL MEDICINE

## 2023-05-16 DEVICE — IMPLANTABLE DEVICE: Type: IMPLANTABLE DEVICE | Site: HIP | Status: FUNCTIONAL

## 2023-05-16 DEVICE — CEMENT ORTHOPEDIC HV US  (10/PK): Type: IMPLANTABLE DEVICE | Site: HIP | Status: FUNCTIONAL

## 2023-05-16 RX ORDER — CALCIUM CARBONATE 500 MG/1
500 TABLET, CHEWABLE ORAL EVERY 6 HOURS PRN
Status: DISCONTINUED | OUTPATIENT
Start: 2023-05-16 | End: 2023-05-19 | Stop reason: HOSPADM

## 2023-05-16 RX ORDER — HYDRALAZINE HYDROCHLORIDE 20 MG/ML
5 INJECTION INTRAMUSCULAR; INTRAVENOUS
Status: DISCONTINUED | OUTPATIENT
Start: 2023-05-16 | End: 2023-05-16 | Stop reason: HOSPADM

## 2023-05-16 RX ORDER — SODIUM CHLORIDE, SODIUM LACTATE, POTASSIUM CHLORIDE, CALCIUM CHLORIDE 600; 310; 30; 20 MG/100ML; MG/100ML; MG/100ML; MG/100ML
INJECTION, SOLUTION INTRAVENOUS CONTINUOUS
Status: DISCONTINUED | OUTPATIENT
Start: 2023-05-16 | End: 2023-05-16 | Stop reason: HOSPADM

## 2023-05-16 RX ORDER — EPHEDRINE SULFATE 50 MG/ML
INJECTION, SOLUTION INTRAVENOUS PRN
Status: DISCONTINUED | OUTPATIENT
Start: 2023-05-16 | End: 2023-05-16 | Stop reason: SURG

## 2023-05-16 RX ORDER — DEXAMETHASONE SODIUM PHOSPHATE 4 MG/ML
INJECTION, SOLUTION INTRA-ARTICULAR; INTRALESIONAL; INTRAMUSCULAR; INTRAVENOUS; SOFT TISSUE PRN
Status: DISCONTINUED | OUTPATIENT
Start: 2023-05-16 | End: 2023-05-16 | Stop reason: SURG

## 2023-05-16 RX ORDER — FAMOTIDINE 20 MG/1
20 TABLET, FILM COATED ORAL 2 TIMES DAILY PRN
Status: DISCONTINUED | OUTPATIENT
Start: 2023-05-16 | End: 2023-05-19 | Stop reason: HOSPADM

## 2023-05-16 RX ORDER — ONDANSETRON 2 MG/ML
INJECTION INTRAMUSCULAR; INTRAVENOUS PRN
Status: DISCONTINUED | OUTPATIENT
Start: 2023-05-16 | End: 2023-05-16 | Stop reason: SURG

## 2023-05-16 RX ORDER — HYDROMORPHONE HYDROCHLORIDE 1 MG/ML
0.4 INJECTION, SOLUTION INTRAMUSCULAR; INTRAVENOUS; SUBCUTANEOUS
Status: DISCONTINUED | OUTPATIENT
Start: 2023-05-16 | End: 2023-05-16 | Stop reason: HOSPADM

## 2023-05-16 RX ORDER — HALOPERIDOL 5 MG/ML
2.5 INJECTION INTRAMUSCULAR ONCE
Status: COMPLETED | OUTPATIENT
Start: 2023-05-16 | End: 2023-05-16

## 2023-05-16 RX ORDER — PHENYLEPHRINE HCL IN 0.9% NACL 0.5 MG/5ML
SYRINGE (ML) INTRAVENOUS PRN
Status: DISCONTINUED | OUTPATIENT
Start: 2023-05-16 | End: 2023-05-16 | Stop reason: SURG

## 2023-05-16 RX ORDER — ROCURONIUM BROMIDE 10 MG/ML
INJECTION, SOLUTION INTRAVENOUS PRN
Status: DISCONTINUED | OUTPATIENT
Start: 2023-05-16 | End: 2023-05-16 | Stop reason: SURG

## 2023-05-16 RX ORDER — ONDANSETRON 2 MG/ML
4 INJECTION INTRAMUSCULAR; INTRAVENOUS
Status: DISCONTINUED | OUTPATIENT
Start: 2023-05-16 | End: 2023-05-16 | Stop reason: HOSPADM

## 2023-05-16 RX ORDER — TRANEXAMIC ACID 100 MG/ML
INJECTION, SOLUTION INTRAVENOUS PRN
Status: DISCONTINUED | OUTPATIENT
Start: 2023-05-16 | End: 2023-05-16 | Stop reason: SURG

## 2023-05-16 RX ORDER — OXYCODONE HCL 5 MG/5 ML
5 SOLUTION, ORAL ORAL
Status: COMPLETED | OUTPATIENT
Start: 2023-05-16 | End: 2023-05-16

## 2023-05-16 RX ORDER — HYDROCHLOROTHIAZIDE 25 MG/1
12.5 TABLET ORAL EVERY MORNING
Status: DISCONTINUED | OUTPATIENT
Start: 2023-05-16 | End: 2023-05-19 | Stop reason: HOSPADM

## 2023-05-16 RX ORDER — METOPROLOL TARTRATE 1 MG/ML
1 INJECTION, SOLUTION INTRAVENOUS
Status: DISCONTINUED | OUTPATIENT
Start: 2023-05-16 | End: 2023-05-16 | Stop reason: HOSPADM

## 2023-05-16 RX ORDER — OXYCODONE HCL 5 MG/5 ML
10 SOLUTION, ORAL ORAL
Status: COMPLETED | OUTPATIENT
Start: 2023-05-16 | End: 2023-05-16

## 2023-05-16 RX ORDER — LIDOCAINE HYDROCHLORIDE 20 MG/ML
INJECTION, SOLUTION EPIDURAL; INFILTRATION; INTRACAUDAL; PERINEURAL PRN
Status: DISCONTINUED | OUTPATIENT
Start: 2023-05-16 | End: 2023-05-16 | Stop reason: SURG

## 2023-05-16 RX ORDER — EPHEDRINE SULFATE 50 MG/ML
5 INJECTION, SOLUTION INTRAVENOUS
Status: DISCONTINUED | OUTPATIENT
Start: 2023-05-16 | End: 2023-05-16 | Stop reason: HOSPADM

## 2023-05-16 RX ORDER — HALOPERIDOL 5 MG/ML
1 INJECTION INTRAMUSCULAR
Status: DISCONTINUED | OUTPATIENT
Start: 2023-05-16 | End: 2023-05-16 | Stop reason: HOSPADM

## 2023-05-16 RX ORDER — CEFAZOLIN SODIUM 1 G/3ML
INJECTION, POWDER, FOR SOLUTION INTRAMUSCULAR; INTRAVENOUS PRN
Status: DISCONTINUED | OUTPATIENT
Start: 2023-05-16 | End: 2023-05-16 | Stop reason: SURG

## 2023-05-16 RX ORDER — HYDROMORPHONE HYDROCHLORIDE 1 MG/ML
0.2 INJECTION, SOLUTION INTRAMUSCULAR; INTRAVENOUS; SUBCUTANEOUS
Status: DISCONTINUED | OUTPATIENT
Start: 2023-05-16 | End: 2023-05-16 | Stop reason: HOSPADM

## 2023-05-16 RX ORDER — SODIUM CHLORIDE, SODIUM LACTATE, POTASSIUM CHLORIDE, CALCIUM CHLORIDE 600; 310; 30; 20 MG/100ML; MG/100ML; MG/100ML; MG/100ML
INJECTION, SOLUTION INTRAVENOUS
Status: DISCONTINUED | OUTPATIENT
Start: 2023-05-16 | End: 2023-05-16 | Stop reason: SURG

## 2023-05-16 RX ORDER — HYDROMORPHONE HYDROCHLORIDE 1 MG/ML
0.1 INJECTION, SOLUTION INTRAMUSCULAR; INTRAVENOUS; SUBCUTANEOUS
Status: DISCONTINUED | OUTPATIENT
Start: 2023-05-16 | End: 2023-05-16 | Stop reason: HOSPADM

## 2023-05-16 RX ORDER — HYDROMORPHONE HYDROCHLORIDE 2 MG/ML
INJECTION, SOLUTION INTRAMUSCULAR; INTRAVENOUS; SUBCUTANEOUS PRN
Status: DISCONTINUED | OUTPATIENT
Start: 2023-05-16 | End: 2023-05-16 | Stop reason: SURG

## 2023-05-16 RX ADMIN — HYDROMORPHONE HYDROCHLORIDE 0.5 MG: 2 INJECTION INTRAMUSCULAR; INTRAVENOUS; SUBCUTANEOUS at 08:08

## 2023-05-16 RX ADMIN — HYDROCHLOROTHIAZIDE 12.5 MG: 25 TABLET ORAL at 17:37

## 2023-05-16 RX ADMIN — DEXAMETHASONE SODIUM PHOSPHATE 4 MG: 4 INJECTION INTRA-ARTICULAR; INTRALESIONAL; INTRAMUSCULAR; INTRAVENOUS; SOFT TISSUE at 07:34

## 2023-05-16 RX ADMIN — HUMAN ALBUMIN MICROSPHERES AND PERFLUTREN 3 ML: 10; .22 INJECTION, SOLUTION INTRAVENOUS at 07:59

## 2023-05-16 RX ADMIN — PREGABALIN 150 MG: 150 CAPSULE ORAL at 05:25

## 2023-05-16 RX ADMIN — PREGABALIN 150 MG: 150 CAPSULE ORAL at 17:38

## 2023-05-16 RX ADMIN — Medication 100 MCG: at 07:51

## 2023-05-16 RX ADMIN — HALOPERIDOL LACTATE 2.5 MG: 5 INJECTION, SOLUTION INTRAMUSCULAR at 22:50

## 2023-05-16 RX ADMIN — HYDRALAZINE HYDROCHLORIDE 5 MG: 20 INJECTION, SOLUTION INTRAMUSCULAR; INTRAVENOUS at 09:33

## 2023-05-16 RX ADMIN — SODIUM CHLORIDE, POTASSIUM CHLORIDE, SODIUM LACTATE AND CALCIUM CHLORIDE: 600; 310; 30; 20 INJECTION, SOLUTION INTRAVENOUS at 07:28

## 2023-05-16 RX ADMIN — OXYCODONE HYDROCHLORIDE 10 MG: 5 SOLUTION ORAL at 09:19

## 2023-05-16 RX ADMIN — LIDOCAINE HYDROCHLORIDE 100 MG: 20 INJECTION, SOLUTION EPIDURAL; INFILTRATION; INTRACAUDAL at 07:34

## 2023-05-16 RX ADMIN — HYDRALAZINE HYDROCHLORIDE 5 MG: 20 INJECTION, SOLUTION INTRAMUSCULAR; INTRAVENOUS at 09:16

## 2023-05-16 RX ADMIN — HEPARIN SODIUM 5000 UNITS: 5000 INJECTION, SOLUTION INTRAVENOUS; SUBCUTANEOUS at 05:25

## 2023-05-16 RX ADMIN — CEFAZOLIN 2 G: 1 INJECTION, POWDER, FOR SOLUTION INTRAMUSCULAR; INTRAVENOUS at 07:34

## 2023-05-16 RX ADMIN — HEPARIN SODIUM 5000 UNITS: 5000 INJECTION, SOLUTION INTRAVENOUS; SUBCUTANEOUS at 21:05

## 2023-05-16 RX ADMIN — Medication 100 MCG: at 07:44

## 2023-05-16 RX ADMIN — ONDANSETRON 4 MG: 2 INJECTION INTRAMUSCULAR; INTRAVENOUS at 08:34

## 2023-05-16 RX ADMIN — FENTANYL CITRATE 50 MCG: 50 INJECTION, SOLUTION INTRAMUSCULAR; INTRAVENOUS at 07:56

## 2023-05-16 RX ADMIN — Medication 100 MCG: at 08:25

## 2023-05-16 RX ADMIN — ROCURONIUM BROMIDE 100 MG: 50 INJECTION, SOLUTION INTRAVENOUS at 07:34

## 2023-05-16 RX ADMIN — TRANEXAMIC ACID 1000 MG: 100 INJECTION, SOLUTION INTRAVENOUS at 07:52

## 2023-05-16 RX ADMIN — Medication 100 MCG: at 07:42

## 2023-05-16 RX ADMIN — ANTACID TABLETS 500 MG: 500 TABLET, CHEWABLE ORAL at 02:55

## 2023-05-16 RX ADMIN — EPHEDRINE SULFATE 10 MG: 50 INJECTION, SOLUTION INTRAVENOUS at 07:44

## 2023-05-16 RX ADMIN — HYDRALAZINE HYDROCHLORIDE 5 MG: 20 INJECTION, SOLUTION INTRAMUSCULAR; INTRAVENOUS at 09:50

## 2023-05-16 RX ADMIN — SENNOSIDES AND DOCUSATE SODIUM 2 TABLET: 50; 8.6 TABLET ORAL at 17:37

## 2023-05-16 RX ADMIN — PROPOFOL 50 MG: 10 INJECTION, EMULSION INTRAVENOUS at 07:35

## 2023-05-16 RX ADMIN — SUGAMMADEX 200 MG: 100 INJECTION, SOLUTION INTRAVENOUS at 08:34

## 2023-05-16 RX ADMIN — ACETAMINOPHEN 1000 MG: 500 TABLET, FILM COATED ORAL at 21:07

## 2023-05-16 RX ADMIN — FENTANYL CITRATE 100 MCG: 50 INJECTION, SOLUTION INTRAMUSCULAR; INTRAVENOUS at 07:34

## 2023-05-16 RX ADMIN — PROPOFOL 150 MG: 10 INJECTION, EMULSION INTRAVENOUS at 07:34

## 2023-05-16 RX ADMIN — METOPROLOL TARTRATE 25 MG: 25 TABLET, FILM COATED ORAL at 17:37

## 2023-05-16 RX ADMIN — ALLOPURINOL 150 MG: 300 TABLET ORAL at 05:25

## 2023-05-16 RX ADMIN — FENTANYL CITRATE 100 MCG: 50 INJECTION, SOLUTION INTRAMUSCULAR; INTRAVENOUS at 07:51

## 2023-05-16 RX ADMIN — HEPARIN SODIUM 5000 UNITS: 5000 INJECTION, SOLUTION INTRAVENOUS; SUBCUTANEOUS at 14:03

## 2023-05-16 ASSESSMENT — PAIN DESCRIPTION - PAIN TYPE
TYPE: SURGICAL PAIN

## 2023-05-16 ASSESSMENT — ENCOUNTER SYMPTOMS
HALLUCINATIONS: 0
DOUBLE VISION: 0
FEVER: 0
SHORTNESS OF BREATH: 0
SORE THROAT: 0
CHILLS: 0
LOSS OF CONSCIOUSNESS: 0
VOMITING: 0
FALLS: 1
NAUSEA: 0
BLURRED VISION: 0
HEADACHES: 0
COUGH: 0
PALPITATIONS: 1
ABDOMINAL PAIN: 0
SEIZURES: 0
DIARRHEA: 0

## 2023-05-16 ASSESSMENT — FIBROSIS 4 INDEX: FIB4 SCORE: 1.92

## 2023-05-16 ASSESSMENT — PAIN SCALES - GENERAL: PAIN_LEVEL: 2

## 2023-05-16 ASSESSMENT — LIFESTYLE VARIABLES: SUBSTANCE_ABUSE: 0

## 2023-05-16 NOTE — ED NOTES
Rounded on patient. Patient updated on waiting for room to be cleaned up stairs. Second trop drawn and sent to lab. Patient denies any needs at this time.

## 2023-05-16 NOTE — ANESTHESIA PREPROCEDURE EVALUATION
Case: [id number] Date/Time: 05/16/23 0715    Procedure: HEMIARTHROPLASTY, HIP    Location: [place] [address] / SURGERY [facility]    Surgeons: Denver Carrillo M.D.        Atrial Fibrillation  Relevant Problems   CARDIAC   (positive) First degree heart block   (positive) Hypertension      Other   (positive) Near syncope       Physical Exam    Airway   Mallampati: II  TM distance: >3 FB  Neck ROM: full       Cardiovascular - normal exam  Rhythm: regular  Rate: normal  (-) murmur     Dental - normal exam           Pulmonary - normal exam  Breath sounds clear to auscultation     Abdominal    Neurological - normal exam                 Anesthesia Plan    ASA 2       Plan - general       Airway plan will be ETT          Induction: intravenous    Postoperative Plan: Postoperative administration of opioids is intended.    Pertinent diagnostic labs and testing reviewed    Informed Consent:    Anesthetic plan and risks discussed with patient.    Use of blood products discussed with: patient whom consented to blood products.

## 2023-05-16 NOTE — OP REPORT
DATE OF OPERATION: 5/16/2023     PREOPERATIVE DIAGNOSIS: Displaced left femoral neck fracture    POSTOPERATIVE DIAGNOSIS: Same    PROCEDURE PERFORMED: Left hip hemiarthroplasty (cemented)    SURGEON: Denver Carrillo M.D.     ASSISTANT: Chester Torres MD, fellow    ANESTHESIA: General    SPECIMEN: None    ESTIMATED BLOOD LOSS: 30 mL    IMPLANTS: [place] cemented size 7 stem, size 50+2.5 mm bipolar head      INDICATIONS: The patient is a 72 y.o. male who presented with displaced left femoral neck fracture after ground-level fall and syncopal episode.  I discussed the risks and benefits of the procedure which include but are not limited to risks of infection, wound healing complication, neurovascular injury, pain, malunion, non-union, malrotation, and the medical risks of anesthesia including MI, stroke, and death.  Alternatives to surgery were also discussed, including non-operative management, which I did not recommend.  The patient was in agreement with the plan to proceed, and the informed consent was signed and documented.  I met with the patient pre-operatively and marked the operative extremity with their agreement.  We proceeded to the operating room.     DESCRIPTION OF PROCEDURE:  Patient was seen in the preoperative holding area on the day of surgery. The operative site was marked with my initials.  he was taken to the operating room and placed lateral on the operative table.  Anesthesia was induced.  The operative extremity was prepped and draped in the normal sterile fashion.  Operative pause was conducted and the correct patient, site, side, procedure, and surgeon's initials on extremity were identified.  A standard posterior approach to the hip was performed.  The fascia was split in line with skin incision.  A retractor was placed under the abductor tendons.  Piriformis tendon was then identified.  We then performed our short external rotators releasing capsulotomy in 1 sleeve.  This was then  tagged for later repair.  We made our femoral neck cut 1 cm proximal to the lesser trochanter.  The femoral head was then removed.  This was then sized to approximately 50 millimeters.  We then used a box osteotome and canal finder followed by sequential broaching.  We broached until appropriate fit was found at a 7 stem.  We then placed our standard head neck trials.  This was reduced and noted to have nearly symmetric limb lengths and stable fit.  The trial implants were then removed and the femoral canal was prepared for cementing.  We placed our canal restrictor and the cement was prepped.  A lap sponge was placed in the acetabulum to avoid any cement getting into the cup.  We then injected the cement when it was ready followed by pressurization.  We then placed our final femoral implant taking care to avoid varus malalignment as well as hold appropriate version.  Once the cement was hardened we then retrialed with a +2.5 mm head due to slight subsidence of the final implant when compared to the trial.  +2.5 mm trial was appropriate with same limb length and very stable fit.  We then remove the trial and placed the final head which was then reduced.  The hip was brought through full range of motion with flexion internal rotation it was very stable.  Again the limb lengths were checked and noted to be symmetric.  The wound was then irrigated with Betadine solution.  This followed by sterile saline.  We then repaired the capsule and short external rotators with several #5 Ethibond sutures.  Fascia was then repaired followed by subcutaneous and skin closure.  Sterile dressings were applied.  Patient was awoken taken to PACU in stable condition.    POSTOPERATIVE PLAN: Weightbearing as tolerated with posterior hip precautions left lower extremity weight bearing.  Mobilize with physical and occupational therapies.  DVT prophylaxis with SCDs and home anticoagulation medication starting 12 hours after surgery.  The  patient will follow up in clinic in 2 weeks to check wounds and remove sutures/staples.      ____________________________________   Denver Carrillo M.D.   DD: 5/16/2023  8:32 AM

## 2023-05-16 NOTE — ANESTHESIA TIME REPORT
Anesthesia Start and Stop Event Times     Date Time Event    5/16/2023 0719 Ready for Procedure     0728 Anesthesia Start     0902 Anesthesia Stop        Responsible Staff  05/16/23    Name Role Begin End    Sha Mai D.O. Anesth 0728 0902        Overtime Reason:  no overtime (within assigned shift)    Comments:

## 2023-05-16 NOTE — ASSESSMENT & PLAN NOTE
Patient fell due to lightheadedness - does not appear to have LOC  Patient reports history of afib although not on any rate control, [facility]  EKG demonstrates ectopic atrial rhythy, RBBB, 1st degree AV block  -telemetry  Echo reviewed  Cortisol is not necessarily consistent with AI

## 2023-05-16 NOTE — ASSESSMENT & PLAN NOTE
HTN history, elevated on presentation. Antihypertensives held per anticipated operative management.  -Hydralazine IV PRN, SBP goals of <170  Restart HCTZ

## 2023-05-16 NOTE — H&P
"History & Physical Note    Date of Admission: 5/16/2023  Admission Status: Emergency  [facility] Team: [facility]  Attending: ROMANA James II*   Senior Resident: Jasper Beltran M.D.  Contact Number: [phone]    Chief Complaint: \"blacked out\" and severe L hip pain     History of Present Illness (HPI):   Andreas is a 72M presenting with severe acute L hip pain after syncopal episode; pmhx includes atrial fibrillation (no AC/meds, [facility]), HTN, DM2, prostate CA (s/p prostatectomy), R renal CA (s/p R nephrectomy).    Has been having presyncopal episodes for the past few months to a year. These events tend to be proceeded by sensations of heart speeding up, for which he generally is able to get somewhere to sit down, which tends to zaid his symptoms. Today, while cleaning his windows--standing on ground level--he had the sensations of his heart speeding up, but was unable to get somewhere to sit down. He lost consciousness and fell to floor, he thinks he regained consciousness after a few seconds to minutes and awoke to intense sharp/crushing pain of the L hip. He was unable to stand and was on ground until EMS was able to arrive. Did not have any tongue biting, nor loss of bowel control. Did not have any head pain and does not believe he hit his head.    [facility], and was told at some point he may need a pacer, but was unsure what the indication or full conversation was.    Review of Systems:   Review of Systems   Constitutional:  Negative for chills, fever, malaise/fatigue and weight loss.   Eyes:  Negative for blurred vision and double vision.   Respiratory:  Negative for cough and shortness of breath.    Cardiovascular:  Positive for palpitations. Negative for chest pain and leg swelling.   Gastrointestinal:  Negative for abdominal pain, constipation, diarrhea, nausea and vomiting.   Genitourinary:  Negative for frequency, hematuria and urgency.   Musculoskeletal:  Positive for back " "pain and joint pain. Negative for neck pain.   Skin:  Negative for itching and rash.   Neurological:  Positive for dizziness and tingling. Negative for focal weakness, weakness and headaches.   Endo/Heme/Allergies:  Does not bruise/bleed easily.   Psychiatric/Behavioral:  Negative for depression. The patient is not nervous/anxious.          Past Medical History:   Past Medical History was reviewed with patient.   has a past medical history of Arthritis, Backpain, Cancer (HCC), Diabetes, Fall, Hypertension, Prostate cancer (HCC), Renal cancer (HCC), and Skin cancer.    Past Surgical History: Past Surgical History was reviewed with patient.    R nephrectomy, prostate brachytherapy/prostatectomy, R knee arthoscopy, appendecomty.   has a past surgical history that includes prostatectomy, radial (2005); other (1984); other orthopedic surgery (1974); appendectomy (1966); gastroscopy with biopsy (1/23/2012); egd w/endoscopic ultrasound (1/23/2012); ercp w/sphincterotomy/papill. (1/23/2012); and ercp w/removal calculus (1/23/2012).    Medications: Medications have been reviewed with patient.  Prior to Admission Medications   Prescriptions Last Dose Informant Patient Reported? Taking?   NIFEdipine (ADALAT CC) 60 MG CR tablet   Yes No   Sig: Take 60 mg by mouth every day.   Non Formulary Request   Yes No   Sig: Take 1,000 Units by mouth every day. \"Cholecalciferol\"    gabapentin (NEURONTIN) 100 MG CAPS   Yes No   Sig: Take 100 mg by mouth 2 Times a Day.   hydrochlorothiazide (HYDRODIURIL) 25 MG TABS   Yes No   Sig: Take 25 mg by mouth every day.   hydrocodone-acetaminophen (NORCO) 7.5-325 MG per tablet   Yes No   Sig: Take 1-2 Tabs by mouth every 8 hours as needed.   valsartan (DIOVAN) 320 MG tablet   Yes No   Sig: Take 320 mg by mouth every day.   venlafaxine (EFFEXOR) 75 MG TABS   Yes No   Sig: Take 75 mg by mouth every day.      Facility-Administered Medications: None        Allergies: Allergies have been reviewed with " patient.  Allergies   Allergen Reactions    Crestor [Rosuvastatin Calcium]     Levaquin     Metformin     Naproxen     Pcn [Penicillins]     Pravastatin     Simvastatin     Statins [Hmg-Coa-R Inhibitors]        Family History:   Confirmed bedside  family history includes Cancer in his unknown relative; Diabetes in his unknown relative; Heart Disease in his unknown relative; Hypertension in his unknown relative; Stroke in his unknown relative.     Social History:   Tobacco: quit 1984   Alcohol: quit 1998   Recreational drugs (illegal and prescription):  Never use   Employment: retired  Activity Level: walking, household activities   Living situation:  house, lives with significant other  Primary Care Provider: [facility]  Other (stressors, spirituality, exposures):  none reported  Physical Exam:   Vitals:  Temp:  [36.4 °C (97.5 °F)-37.1 °C (98.8 °F)] 37.1 °C (98.8 °F)  Pulse:  [66-84] 84  Resp:  [14-26] 16  BP: (128-177)/(75-94) 132/79  SpO2:  [89 %-97 %] 89 %    Physical Exam    Labs:   Recent Labs     05/15/23  1712   WBC 11.6*   RBC 5.42   HEMOGLOBIN 16.5   HEMATOCRIT 49.8   MCV 91.9   MCH 30.4   RDW 45.5   PLATELETCT 175   MPV 9.5   NEUTSPOLYS 83.30*   LYMPHOCYTES 7.70*   MONOCYTES 7.30   EOSINOPHILS 0.40   BASOPHILS 0.50     Lab Results   Component Value Date/Time    SODIUM 139 05/15/2023 05:12 PM    POTASSIUM 4.0 05/15/2023 05:12 PM    CHLORIDE 105 05/15/2023 05:12 PM    CO2 21 05/15/2023 05:12 PM    GLUCOSE 137 (H) 05/15/2023 05:12 PM    BUN 20 05/15/2023 05:12 PM    CREATININE 0.95 05/15/2023 05:12 PM          EKG: Per my read, 1st degree heart block, with right and left block, T wave inversion of anterior leads of undetermined chronicity,  no ST elevations.    Imaging:   XR L hip 5/15:  Acute comminuted and displaced fracture of the left femoral neck.    CXR 5/15:  HEART: Enlarged. There is atherosclerotic calcification in the aortic arch.  LUNGS: Low lung volumes. Interstitial  thickening.  PLEURA: No effusion or pneumothorax.    Previous Data Review: reviewed    Problem Representation:     Andreas is a 72M presenting with severe acute L hip pain after syncopal episode; pmhx includes atrial fibrillation (no AC/meds, [facility]), HTN, DM2, prostate CA (s/p prostatectomy), R renal CA (s/p R nephrectomy).    * Syncope and collapse- (present on admission)  Assessment & Plan  Syncopal episode, with preceding palpitations, suspicious for paroxysmal atrial fibrillation (unconfirmed from [facility]) or possible ame malconduction. Unlikely to be seizure, orthostatic hypotension; less likely TIA.  -telemetry  -troponin x2  -BNP  -TTE recheck  -[facility], echo, cardiology progress notes  -orthostatic vitals      Pathological fracture of left femur due to osteoporosis (HCC)  Assessment & Plan  L hip fracture, acute. NSQIP risk for hip osteotomy with fixation, ASA class 2: complication risks 4.2% (above average), cardiac complication 0.7% (above average), discharge to rehab 14% (above average by x2). Patient is in severe pain, will be on narcotic pain management which requires close hemodynamic and respiratory monitoring.  -oxycodone 5-10mg PO q3h/PRN  -diluadid IV 0.5mg IV q3h/PRN  -tylenol 1000mg PO TID  -tylenol 650mg PO q6h/PRN  -NPO midnight  -Orthopaedic surgery consulted in ED (Gerardo)      First degree heart block  Assessment & Plan  Severe first degree heart block.  -telemetry monitoring  -consideration of cardiology consultation if more severe heart block observed during admission    Neuropathy  Assessment & Plan  Chronic neuropathy.  -Gabapentin 100mg PO BID/PRN    Leukocytosis  Assessment & Plan  Leukocytosis, most likely reactive.  -CBC recheck    Hypertension- (present on admission)  Assessment & Plan  HTN history, elevated on presentation. Antihypertensives held per anticipated operative management.  -HOLD HCTZ 25mg PO qD  -HOLD nifedpine 60mg PO  qD  -HOLD valsartan 320mg PO qD  -Hydralazine IV PRN, SBP goals of <170

## 2023-05-16 NOTE — ASSESSMENT & PLAN NOTE
L hip fracture, acute.  As demonstrated on imaging  S/p left hip hemiarthroplasty 05/16  Pain control  Physiatry consult for IPR candidacy

## 2023-05-16 NOTE — ASSESSMENT & PLAN NOTE
Severe first degree heart block.  -telemetry monitoring  -consideration of cardiology consultation if more severe heart block observed during admission

## 2023-05-16 NOTE — OR NURSING
Patient AAOx4, calm, c/o left hip discomfort postop. Oral pain medication administered, patient states pain is improving. Left lateral hip silver mepilex dressing CDI, ice applied. LLE CMS intact, palpable pedal pulses. Patient tolerating water, no nausea. Patient hypertensive, PRN hydralazine administered per order. SBP down to 168/93. Attempted to contact wife, no answer. Patient states he will call once in room.

## 2023-05-16 NOTE — PROGRESS NOTES
4 Eyes Skin Assessment Completed by DERICK Avitia and LEE QUAN.    Head WDL  Ears WDL  Nose WDL  Mouth WDL  Neck WDL  Breast/Chest WDL  Shoulder Blades WDL  Spine WDL  (R) Arm/Elbow/Hand WDL  (L) Arm/Elbow/Hand WDL  Abdomen WDL  Groin Bruising L hip  Scrotum/Coccyx/Buttocks Redness and Blanching  (R) Leg Scab  (L) Leg Scab  (R) Heel/Foot/Toe WDL  (L) Heel/Foot/Toe WDL          Devices In Places Tele Box and Pulse Ox      Interventions In Place Pillows    Possible Skin Injury No    Pictures Uploaded Into Epic N/A  Wound Consult Placed N/A  RN Wound Prevention Protocol Ordered No

## 2023-05-16 NOTE — ANESTHESIA POSTPROCEDURE EVALUATION
Patient: Andreas PAGE Knee    Procedure Summary     Date: 05/16/23 Room / Location: [facility] 16 / SURGERY [facility]    Anesthesia Start: 0728 Anesthesia Stop: 0902    Procedure: HEMIARTHROPLASTY, HIP (Hip) Diagnosis: (displaced left femoral neck fracture)    Surgeons: Denver Carrillo M.D. Responsible Provider: Sha Mai D.O.    Anesthesia Type: general ASA Status: 2          Final Anesthesia Type: general  Last vitals  BP   Blood Pressure : (!) 157/82    Temp   36.4 °C (97.5 °F)    Pulse   93   Resp   14    SpO2   93 %      Anesthesia Post Evaluation    Patient location during evaluation: PACU  Patient participation: complete - patient participated  Level of consciousness: awake and alert  Pain score: 2    Airway patency: patent  Anesthetic complications: no  Cardiovascular status: hemodynamically stable  Respiratory status: acceptable  Hydration status: euvolemic    PONV: none          No notable events documented.     Nurse Pain Score: 2 (NPRS)

## 2023-05-16 NOTE — CONSULTS
5/16/2023    Time Called: 0300  Time Arrived: 0700      HPI: Andreas Ortega is a 72 y.o. male who presents with left hip pain after syncopal fall last evening.  He does have a history of atrial fibrillation.  He noticed dizzy feeling when he was trying to make it to the bed but was unable to.  Also of note he has history of borderline diabetes and prostate cancer now in remission.  He is brought into the ED where initial work-up revealed displaced left femoral neck fracture.  He has been unable to ambulate since the fall.    Past Medical History:   Diagnosis Date    Arthritis     hands, knees, shoulders    Backpain     Cancer (HCC)     prostectomy, R kidney removal, skin    Diabetes     borderline    Fall     3 days ago    Hypertension     Prostate cancer (HCC)     Renal cancer (HCC)     Skin cancer        Past Surgical History:   Procedure Laterality Date    GASTROSCOPY WITH BIOPSY  1/23/2012    Performed by OBDULIO ALEJANDRA at [facility] ORS    EGD W/ENDOSCOPIC ULTRASOUND  1/23/2012    Performed by OBDULIO ALEJANDRA at [facility] ORS    ERCP W/SPHINCTEROTOMY/PAPILL.  1/23/2012    Performed by OBDULIO ALEJANDRA at [facility] ORS    ERCP W/REMOVAL CALCULUS  1/23/2012    Performed by OBDULIO ALEJANDRA at [facility] ORS    PROSTATECTOMY, RADIAL  2005    OTHER  1984    Right kidney removed    OTHER ORTHOPEDIC SURGERY  1974    R knee surgery, repair    APPENDECTOMY  1966       Medications  No current facility-administered medications on file prior to encounter.     Current Outpatient Medications on File Prior to Encounter   Medication Sig Dispense Refill    pregabalin (LYRICA) 150 MG Cap Take 150 mg by mouth 2 times a day.      oxyCODONE immediate release (ROXICODONE) 10 MG immediate release tablet Take 10 mg by mouth every 6 hours as needed (for pain).      vitamin D2, Ergocalciferol, (DRISDOL) 1.25 MG (91972 UT) Cap capsule Take 50,000 Units by mouth every 7 days.      fenofibrate  "(TRICOR) 145 MG Tab Take 145 mg by mouth every day.      zolpidem (AMBIEN) 10 MG Tab Take 10 mg by mouth at bedtime as needed for Sleep.      allopurinol (ZYLOPRIM) 300 MG Tab Take 150 mg by mouth every day.      Cyanocobalamin (VITAMIN B-12) 1000 MCG Tab Take 2,000 mcg by mouth every day.      Magnesium Oxide 420 MG Tab Take 420 mg by mouth every day.      hydroCHLOROthiazide (HYDRODIURIL) 12.5 MG tablet Take 12.5 mg by mouth every morning.      NIFEdipine (ADALAT CC) 30 MG CR tablet Take 30 mg by mouth every day.         Allergies  Crestor [rosuvastatin calcium], Levaquin, Metformin, Naproxen, Pcn [penicillins], Pravastatin, Simvastatin, and Statins [hmg-coa-r inhibitors]    ROS  . All other systems were reviewed and found to be negative    Family History   Problem Relation Age of Onset    Cancer Unknown     Diabetes Unknown     Hypertension Unknown     Heart Disease Unknown     Stroke Unknown        Social History     Socioeconomic History    Marital status:    Tobacco Use    Smoking status: Never   Vaping Use    Vaping Use: Never used   Substance and Sexual Activity    Alcohol use: No    Drug use: No       Physical Exam  Vitals  BP (!) 176/83   Pulse 80   Temp 36.1 °C (97 °F) (Temporal)   Resp 16   Ht 1.905 m (6' 3\")   Wt 104 kg (230 lb)   SpO2 95%   General: Well Developed, Well Nourished, Age appropriate appearance  HEENT: Normocephalic, atraumatic  Psych: Normal mood and affect  Neck: Supple, nontender, no masses  Lungs: Breathing unlabored, No audible wheezing  Heart: Regular heart rate and rhythm  Abdomen: Soft, NT, ND  Neuro: Sensation grossly intact to BUE and BLE, moving all four extremities  Skin: Intact, no open wounds  Vascular: Foot is warm and well-perfused, Capillary refill <2 seconds  MSK: Left lower extremity: Pain with logroll.  Obvious shortening noted.      Radiographs:  EC-ECHOCARDIOGRAM COMPLETE W/ CONT         DX-CHEST-PORTABLE (1 VIEW)   Final Result      1.  Low lung volumes. "   2.  Hypoventilatory changes versus interstitial infiltrates and/or edema.   3.  Cardiomegaly.      DX-HIP-UNILATERAL-WITH PELVIS-1 VIEW LEFT   Final Result         Acute comminuted and displaced fracture of the left femoral neck.          Laboratory Values  Recent Labs     05/15/23  1712 05/16/23  0257   WBC 11.6* 10.5   RBC 5.42 5.27   HEMOGLOBIN 16.5 15.9   HEMATOCRIT 49.8 48.7   MCV 91.9 92.4   MCH 30.4 30.2   MCHC 33.1* 32.6*   RDW 45.5 45.6   PLATELETCT 175 182   MPV 9.5 9.5     Recent Labs     05/15/23  1712 05/16/23  0257   SODIUM 139 139   POTASSIUM 4.0 4.9   CHLORIDE 105 100   CO2 21 26   GLUCOSE 137* 122*   BUN 20 18     Recent Labs     05/16/23  0257   INR 1.10         Impression: 72-year-old male ground-level fall after syncopal episode with displaced left femoral neck fracture.  Plan for cemented hip hemiarthroplasty today.    Plan:We discussed the diagnosis and findings with the patient at length.  We reviewed possible non operative and operative interventions and the risks and benefits of each of these.  he had a chance to ask questions and all of these were answered to his satisfaction. The patient chose to proceed with surgical intervention. Risks and benefits of surgery were discussed which include but are not limited to bleeding, infection, neurovascular damage, malunion, nonunion, instability, limb length discrepancy, DVT, PE, MI, Stroke and death. They understand these risks and wish to proceed.      Denver Carrillo MD  Orthopedic Trauma Surgery

## 2023-05-16 NOTE — PROGRESS NOTES
Hospital Medicine Daily Progress Note    Date of Service  5/16/2023    Chief Complaint  Andreas Ortega is a 72 y.o. male admitted 5/15/2023 with syncope and hip fracture    Hospital Course  No notes on file    Interval Problem Update  Patient was seen and examined at bedside.  No acute events overnight. Patient is resting comfortably in bed and in no acute distress.     S/p OR  Pain control  Echo reviewed  Add BB for benefit of rate control    I have discussed this patient's plan of care and discharge plan at IDT rounds today with Case Management, Nursing, Nursing leadership, and other members of the IDT team.    Consultants/Specialty  orthopedics    Code Status  Full Code    Disposition  The patient is not medically cleared for discharge to home or a post-acute facility.  Anticipate discharge to: an inpatient rehabilitation hospital    I have placed the appropriate orders for post-discharge needs.    Review of Systems  Review of Systems   Constitutional:  Negative for chills and fever.   HENT:  Negative for congestion and sore throat.    Eyes:  Negative for blurred vision and double vision.   Respiratory:  Negative for cough and shortness of breath.    Cardiovascular:  Positive for palpitations. Negative for chest pain.   Gastrointestinal:  Negative for abdominal pain, diarrhea, nausea and vomiting.   Genitourinary:  Negative for dysuria and frequency.   Musculoskeletal:  Positive for falls.   Skin:  Negative for rash.   Neurological:  Negative for seizures, loss of consciousness and headaches.   Psychiatric/Behavioral:  Negative for hallucinations and substance abuse.         Physical Exam  Temp:  [36.1 °C (97 °F)-37.2 °C (99 °F)] 37.2 °C (99 °F)  Pulse:  [] 81  Resp:  [12-26] 16  BP: (125-206)/(74-98) 125/74  SpO2:  [88 %-97 %] 94 %    Physical Exam  Vitals and nursing note reviewed.   Constitutional:       General: He is not in acute distress.     Appearance: He is not toxic-appearing.   HENT:      Head:  Normocephalic.      Right Ear: External ear normal.      Left Ear: External ear normal.      Nose: No congestion.      Mouth/Throat:      Mouth: Mucous membranes are moist.      Pharynx: No oropharyngeal exudate.   Eyes:      General: No scleral icterus.     Pupils: Pupils are equal, round, and reactive to light.   Cardiovascular:      Rate and Rhythm: Normal rate and regular rhythm.      Heart sounds: No murmur heard.  Pulmonary:      Breath sounds: No wheezing.   Abdominal:      Palpations: Abdomen is soft.      Tenderness: There is no abdominal tenderness. There is no guarding or rebound.   Musculoskeletal:         General: No swelling or deformity.      Comments: Post surgical changes to left hip  Left lower extremity neurovascularly in tact   Skin:     Capillary Refill: Capillary refill takes less than 2 seconds.      Coloration: Skin is not jaundiced.      Findings: No bruising.   Neurological:      General: No focal deficit present.      Mental Status: He is alert.      Motor: No weakness.   Psychiatric:         Mood and Affect: Mood normal.         Behavior: Behavior normal.         Fluids    Intake/Output Summary (Last 24 hours) at 5/16/2023 1711  Last data filed at 5/16/2023 1500  Gross per 24 hour   Intake 2800 ml   Output 1325 ml   Net 1475 ml       Laboratory  Recent Labs     05/15/23  1712 05/16/23  0257   WBC 11.6* 10.5   RBC 5.42 5.27   HEMOGLOBIN 16.5 15.9   HEMATOCRIT 49.8 48.7   MCV 91.9 92.4   MCH 30.4 30.2   MCHC 33.1* 32.6*   RDW 45.5 45.6   PLATELETCT 175 182   MPV 9.5 9.5     Recent Labs     05/15/23  1712 05/16/23  0257   SODIUM 139 139   POTASSIUM 4.0 4.9   CHLORIDE 105 100   CO2 21 26   GLUCOSE 137* 122*   BUN 20 18   CREATININE 0.95 0.89   CALCIUM 9.3 9.1     Recent Labs     05/16/23  0257   INR 1.10               Imaging  EC-ECHOCARDIOGRAM COMPLETE W/ CONT   Final Result      DX-CHEST-PORTABLE (1 VIEW)   Final Result      1.  Low lung volumes.   2.  Hypoventilatory changes versus  interstitial infiltrates and/or edema.   3.  Cardiomegaly.      DX-HIP-UNILATERAL-WITH PELVIS-1 VIEW LEFT   Final Result         Acute comminuted and displaced fracture of the left femoral neck.           Assessment/Plan  * Syncope and collapse- (present on admission)  Assessment & Plan  Patient reports history of afib although not on any rate control  EKG demonstrates ectopic atrial rhythy, RBBB, 1st degree AV block  -telemetry  -troponin x2  Echo reviewed  Check AM cortisol      First degree heart block  Assessment & Plan  Severe first degree heart block.  -telemetry monitoring  -consideration of cardiology consultation if more severe heart block observed during admission    Neuropathy  Assessment & Plan  Chronic neuropathy.  -Gabapentin 100mg PO BID/PRN    Hypertension- (present on admission)  Assessment & Plan  HTN history, elevated on presentation. Antihypertensives held per anticipated operative management.  -Hydralazine IV PRN, SBP goals of <170  Restart HCTZ    Leukocytosis  Assessment & Plan  Leukocytosis, most likely reactive.  -CBC recheck    Pathological fracture of left femur due to osteoporosis (HCC)  Assessment & Plan  L hip fracture, acute.  As demonstrated on imaging  S/p left hip hemiarthroplasty 05/16  Pain control  Physiatry consult for IPR candidacy            VTE prophylaxis: SCDs/TEDs and heparin ppx    I have performed a physical exam and reviewed and updated ROS and Plan today (5/16/2023). In review of yesterday's note (5/15/2023), there are no changes except as documented above.

## 2023-05-16 NOTE — ANESTHESIA PROCEDURE NOTES
Airway    Date/Time: 5/16/2023 7:36 AM    Performed by: Sha Mai D.O.  Authorized by: Sha Mai D.O.    Location:  OR  Urgency:  Elective  Indications for Airway Management:  Anesthesia      Spontaneous Ventilation: absent    Sedation Level:  Deep  Preoxygenated: Yes    Patient Position:  Sniffing  Mask Difficulty Assessment:  2 - vent by mask + OA or adjuvant +/- NMBA  Final Airway Type:  Endotracheal airway  Final Endotracheal Airway:  ETT  Cuffed: Yes    Technique Used for Successful ETT Placement:  Direct laryngoscopy    Insertion Site:  Oral  Blade Type:  Kiet  Laryngoscope Blade/Videolaryngoscope Blade Size:  4  ETT Size (mm):  8.0  Measured from:  Teeth  ETT to Teeth (cm):  24  Placement Verified by: auscultation and capnometry    Cormack-Lehane Classification:  Grade I - full view of glottis  Number of Attempts at Approach:  1

## 2023-05-17 ENCOUNTER — APPOINTMENT (OUTPATIENT)
Dept: RADIOLOGY | Facility: MEDICAL CENTER | Age: 73
DRG: 522 | End: 2023-05-17
Payer: COMMERCIAL

## 2023-05-17 ENCOUNTER — HOSPITAL ENCOUNTER (INPATIENT)
Facility: REHABILITATION | Age: 73
End: 2023-05-17
Attending: PHYSICAL MEDICINE & REHABILITATION | Admitting: PHYSICAL MEDICINE & REHABILITATION
Payer: COMMERCIAL

## 2023-05-17 PROBLEM — E66.9 CLASS 1 OBESITY IN ADULT: Status: ACTIVE | Noted: 2023-05-17

## 2023-05-17 PROBLEM — E66.811 CLASS 1 OBESITY IN ADULT: Status: ACTIVE | Noted: 2023-05-17

## 2023-05-17 PROBLEM — D69.6 THROMBOCYTOPENIA (HCC): Status: ACTIVE | Noted: 2023-05-17

## 2023-05-17 LAB
ALBUMIN SERPL BCP-MCNC: 3.4 G/DL (ref 3.2–4.9)
ALBUMIN SERPL BCP-MCNC: 3.5 G/DL (ref 3.2–4.9)
APPEARANCE UR: CLEAR
BACTERIA #/AREA URNS HPF: NEGATIVE /HPF
BASOPHILS # BLD AUTO: 0.2 % (ref 0–1.8)
BASOPHILS # BLD: 0.03 K/UL (ref 0–0.12)
BILIRUB UR QL STRIP.AUTO: NEGATIVE
BUN SERPL-MCNC: 19 MG/DL (ref 8–22)
BUN SERPL-MCNC: 21 MG/DL (ref 8–22)
CALCIUM ALBUM COR SERPL-MCNC: 9 MG/DL (ref 8.5–10.5)
CALCIUM ALBUM COR SERPL-MCNC: 9.1 MG/DL (ref 8.5–10.5)
CALCIUM SERPL-MCNC: 8.5 MG/DL (ref 8.5–10.5)
CALCIUM SERPL-MCNC: 8.7 MG/DL (ref 8.5–10.5)
CHLORIDE SERPL-SCNC: 101 MMOL/L (ref 96–112)
CHLORIDE SERPL-SCNC: 98 MMOL/L (ref 96–112)
CO2 SERPL-SCNC: 25 MMOL/L (ref 20–33)
CO2 SERPL-SCNC: 25 MMOL/L (ref 20–33)
COLOR UR: YELLOW
CORTIS SERPL-MCNC: 7.5 UG/DL (ref 0–23)
CREAT SERPL-MCNC: 1.13 MG/DL (ref 0.5–1.4)
CREAT SERPL-MCNC: 1.17 MG/DL (ref 0.5–1.4)
EOSINOPHIL # BLD AUTO: 0.02 K/UL (ref 0–0.51)
EOSINOPHIL NFR BLD: 0.1 % (ref 0–6.9)
EPI CELLS #/AREA URNS HPF: NEGATIVE /HPF
ERYTHROCYTE [DISTWIDTH] IN BLOOD BY AUTOMATED COUNT: 46.1 FL (ref 35.9–50)
ERYTHROCYTE [DISTWIDTH] IN BLOOD BY AUTOMATED COUNT: 47.7 FL (ref 35.9–50)
GFR SERPLBLD CREATININE-BSD FMLA CKD-EPI: 65 ML/MIN/1.73 M 2
GFR SERPLBLD CREATININE-BSD FMLA CKD-EPI: 68 ML/MIN/1.73 M 2
GLUCOSE SERPL-MCNC: 145 MG/DL (ref 65–99)
GLUCOSE SERPL-MCNC: 151 MG/DL (ref 65–99)
GLUCOSE UR STRIP.AUTO-MCNC: NEGATIVE MG/DL
HCT VFR BLD AUTO: 41 % (ref 42–52)
HCT VFR BLD AUTO: 43.4 % (ref 42–52)
HGB BLD-MCNC: 13.9 G/DL (ref 14–18)
HGB BLD-MCNC: 14.3 G/DL (ref 14–18)
HYALINE CASTS #/AREA URNS LPF: ABNORMAL /LPF
IMM GRANULOCYTES # BLD AUTO: 0.05 K/UL (ref 0–0.11)
IMM GRANULOCYTES NFR BLD AUTO: 0.3 % (ref 0–0.9)
KETONES UR STRIP.AUTO-MCNC: NEGATIVE MG/DL
LACTATE SERPL-SCNC: 1.4 MMOL/L (ref 0.5–2)
LEUKOCYTE ESTERASE UR QL STRIP.AUTO: NEGATIVE
LYMPHOCYTES # BLD AUTO: 1.26 K/UL (ref 1–4.8)
LYMPHOCYTES NFR BLD: 8.6 % (ref 22–41)
MAGNESIUM SERPL-MCNC: 1.6 MG/DL (ref 1.5–2.5)
MCH RBC QN AUTO: 30.2 PG (ref 27–33)
MCH RBC QN AUTO: 31.5 PG (ref 27–33)
MCHC RBC AUTO-ENTMCNC: 32.9 G/DL (ref 33.7–35.3)
MCHC RBC AUTO-ENTMCNC: 33.9 G/DL (ref 33.7–35.3)
MCV RBC AUTO: 91.6 FL (ref 81.4–97.8)
MCV RBC AUTO: 93 FL (ref 81.4–97.8)
MICRO URNS: ABNORMAL
MONOCYTES # BLD AUTO: 1.42 K/UL (ref 0–0.85)
MONOCYTES NFR BLD AUTO: 9.6 % (ref 0–13.4)
NEUTROPHILS # BLD AUTO: 11.95 K/UL (ref 1.82–7.42)
NEUTROPHILS NFR BLD: 81.2 % (ref 44–72)
NITRITE UR QL STRIP.AUTO: NEGATIVE
NRBC # BLD AUTO: 0 K/UL
NRBC BLD-RTO: 0 /100 WBC
PH UR STRIP.AUTO: 6 [PH] (ref 5–8)
PHOSPHATE SERPL-MCNC: 1.9 MG/DL (ref 2.5–4.5)
PHOSPHATE SERPL-MCNC: 3.1 MG/DL (ref 2.5–4.5)
PLATELET # BLD AUTO: 147 K/UL (ref 164–446)
PLATELET # BLD AUTO: 162 K/UL (ref 164–446)
PMV BLD AUTO: 9.6 FL (ref 9–12.9)
PMV BLD AUTO: 9.9 FL (ref 9–12.9)
POTASSIUM SERPL-SCNC: 4.1 MMOL/L (ref 3.6–5.5)
POTASSIUM SERPL-SCNC: 4.2 MMOL/L (ref 3.6–5.5)
PROCALCITONIN SERPL-MCNC: 0.17 NG/ML
PROT UR QL STRIP: 30 MG/DL
RBC # BLD AUTO: 4.41 M/UL (ref 4.7–6.1)
RBC # BLD AUTO: 4.74 M/UL (ref 4.7–6.1)
RBC # URNS HPF: ABNORMAL /HPF
RBC UR QL AUTO: NEGATIVE
SCCMEC + MECA PNL NOSE NAA+PROBE: NEGATIVE
SODIUM SERPL-SCNC: 133 MMOL/L (ref 135–145)
SODIUM SERPL-SCNC: 136 MMOL/L (ref 135–145)
SP GR UR STRIP.AUTO: 1.01
UROBILINOGEN UR STRIP.AUTO-MCNC: 0.2 MG/DL
WBC # BLD AUTO: 13.2 K/UL (ref 4.8–10.8)
WBC # BLD AUTO: 14.7 K/UL (ref 4.8–10.8)
WBC #/AREA URNS HPF: ABNORMAL /HPF

## 2023-05-17 PROCEDURE — 80069 RENAL FUNCTION PANEL: CPT

## 2023-05-17 PROCEDURE — 700111 HCHG RX REV CODE 636 W/ 250 OVERRIDE (IP)

## 2023-05-17 PROCEDURE — 700102 HCHG RX REV CODE 250 W/ 637 OVERRIDE(OP): Performed by: INTERNAL MEDICINE

## 2023-05-17 PROCEDURE — A9270 NON-COVERED ITEM OR SERVICE: HCPCS | Performed by: STUDENT IN AN ORGANIZED HEALTH CARE EDUCATION/TRAINING PROGRAM

## 2023-05-17 PROCEDURE — 97163 PT EVAL HIGH COMPLEX 45 MIN: CPT

## 2023-05-17 PROCEDURE — 700111 HCHG RX REV CODE 636 W/ 250 OVERRIDE (IP): Performed by: INTERNAL MEDICINE

## 2023-05-17 PROCEDURE — 87040 BLOOD CULTURE FOR BACTERIA: CPT

## 2023-05-17 PROCEDURE — 36415 COLL VENOUS BLD VENIPUNCTURE: CPT

## 2023-05-17 PROCEDURE — 87641 MR-STAPH DNA AMP PROBE: CPT

## 2023-05-17 PROCEDURE — A9270 NON-COVERED ITEM OR SERVICE: HCPCS | Performed by: INTERNAL MEDICINE

## 2023-05-17 PROCEDURE — 700101 HCHG RX REV CODE 250

## 2023-05-17 PROCEDURE — 81001 URINALYSIS AUTO W/SCOPE: CPT

## 2023-05-17 PROCEDURE — 700102 HCHG RX REV CODE 250 W/ 637 OVERRIDE(OP)

## 2023-05-17 PROCEDURE — 85027 COMPLETE CBC AUTOMATED: CPT

## 2023-05-17 PROCEDURE — A9270 NON-COVERED ITEM OR SERVICE: HCPCS

## 2023-05-17 PROCEDURE — 700102 HCHG RX REV CODE 250 W/ 637 OVERRIDE(OP): Performed by: STUDENT IN AN ORGANIZED HEALTH CARE EDUCATION/TRAINING PROGRAM

## 2023-05-17 PROCEDURE — 700111 HCHG RX REV CODE 636 W/ 250 OVERRIDE (IP): Performed by: STUDENT IN AN ORGANIZED HEALTH CARE EDUCATION/TRAINING PROGRAM

## 2023-05-17 PROCEDURE — 770001 HCHG ROOM/CARE - MED/SURG/GYN PRIV*

## 2023-05-17 PROCEDURE — 99024 POSTOP FOLLOW-UP VISIT: CPT | Performed by: STUDENT IN AN ORGANIZED HEALTH CARE EDUCATION/TRAINING PROGRAM

## 2023-05-17 PROCEDURE — 99232 SBSQ HOSP IP/OBS MODERATE 35: CPT | Performed by: INTERNAL MEDICINE

## 2023-05-17 RX ORDER — MAGNESIUM SULFATE HEPTAHYDRATE 40 MG/ML
2 INJECTION, SOLUTION INTRAVENOUS ONCE
Status: COMPLETED | OUTPATIENT
Start: 2023-05-17 | End: 2023-05-17

## 2023-05-17 RX ORDER — ALPRAZOLAM 0.25 MG/1
0.25 TABLET ORAL ONCE
Status: DISCONTINUED | OUTPATIENT
Start: 2023-05-17 | End: 2023-05-17

## 2023-05-17 RX ORDER — HYDROMORPHONE HYDROCHLORIDE 1 MG/ML
0.25 INJECTION, SOLUTION INTRAMUSCULAR; INTRAVENOUS; SUBCUTANEOUS
Status: DISCONTINUED | OUTPATIENT
Start: 2023-05-17 | End: 2023-05-19 | Stop reason: HOSPADM

## 2023-05-17 RX ORDER — OXYCODONE HYDROCHLORIDE 5 MG/1
2.5 TABLET ORAL
Status: DISCONTINUED | OUTPATIENT
Start: 2023-05-17 | End: 2023-05-19 | Stop reason: HOSPADM

## 2023-05-17 RX ORDER — OXYCODONE HYDROCHLORIDE 5 MG/1
5 TABLET ORAL
Status: DISCONTINUED | OUTPATIENT
Start: 2023-05-17 | End: 2023-05-19 | Stop reason: HOSPADM

## 2023-05-17 RX ORDER — ALPRAZOLAM 0.5 MG/1
0.5 TABLET ORAL ONCE
Status: COMPLETED | OUTPATIENT
Start: 2023-05-18 | End: 2023-05-17

## 2023-05-17 RX ORDER — VALSARTAN 80 MG/1
160 TABLET ORAL DAILY
Status: ON HOLD | COMMUNITY
End: 2023-05-19

## 2023-05-17 RX ORDER — SODIUM CHLORIDE, SODIUM LACTATE, POTASSIUM CHLORIDE, AND CALCIUM CHLORIDE .6; .31; .03; .02 G/100ML; G/100ML; G/100ML; G/100ML
1000 INJECTION, SOLUTION INTRAVENOUS
Status: DISCONTINUED | OUTPATIENT
Start: 2023-05-17 | End: 2023-05-19 | Stop reason: HOSPADM

## 2023-05-17 RX ORDER — ENOXAPARIN SODIUM 100 MG/ML
40 INJECTION SUBCUTANEOUS DAILY
Status: DISCONTINUED | OUTPATIENT
Start: 2023-05-17 | End: 2023-05-19 | Stop reason: HOSPADM

## 2023-05-17 RX ADMIN — HEPARIN SODIUM 5000 UNITS: 5000 INJECTION, SOLUTION INTRAVENOUS; SUBCUTANEOUS at 04:22

## 2023-05-17 RX ADMIN — METOPROLOL TARTRATE 25 MG: 25 TABLET, FILM COATED ORAL at 17:33

## 2023-05-17 RX ADMIN — METOPROLOL TARTRATE 25 MG: 25 TABLET, FILM COATED ORAL at 04:22

## 2023-05-17 RX ADMIN — ACETAMINOPHEN 1000 MG: 500 TABLET, FILM COATED ORAL at 04:20

## 2023-05-17 RX ADMIN — ALPRAZOLAM 0.5 MG: 0.5 TABLET ORAL at 23:44

## 2023-05-17 RX ADMIN — PREGABALIN 150 MG: 150 CAPSULE ORAL at 17:34

## 2023-05-17 RX ADMIN — ACETAMINOPHEN 1000 MG: 500 TABLET, FILM COATED ORAL at 23:24

## 2023-05-17 RX ADMIN — PREGABALIN 150 MG: 150 CAPSULE ORAL at 04:21

## 2023-05-17 RX ADMIN — ACETAMINOPHEN 1000 MG: 500 TABLET, FILM COATED ORAL at 17:33

## 2023-05-17 RX ADMIN — HYDROCHLOROTHIAZIDE 12.5 MG: 25 TABLET ORAL at 04:21

## 2023-05-17 RX ADMIN — ENOXAPARIN SODIUM 40 MG: 100 INJECTION SUBCUTANEOUS at 17:32

## 2023-05-17 RX ADMIN — SENNOSIDES AND DOCUSATE SODIUM 2 TABLET: 50; 8.6 TABLET ORAL at 04:20

## 2023-05-17 RX ADMIN — MAGNESIUM SULFATE HEPTAHYDRATE 2 G: 2 INJECTION, SOLUTION INTRAVENOUS at 04:26

## 2023-05-17 RX ADMIN — ALLOPURINOL 150 MG: 300 TABLET ORAL at 04:22

## 2023-05-17 RX ADMIN — CEFTRIAXONE SODIUM 2000 MG: 10 INJECTION, POWDER, FOR SOLUTION INTRAVENOUS at 04:20

## 2023-05-17 ASSESSMENT — ENCOUNTER SYMPTOMS
HEADACHES: 0
ABDOMINAL PAIN: 0
DIARRHEA: 0
PALPITATIONS: 0
COUGH: 0
BLURRED VISION: 0
SORE THROAT: 0
VOMITING: 0
CHILLS: 0
NAUSEA: 0
DOUBLE VISION: 0
SHORTNESS OF BREATH: 0
HALLUCINATIONS: 0
SEIZURES: 0
FEVER: 0
FALLS: 1
LOSS OF CONSCIOUSNESS: 0

## 2023-05-17 ASSESSMENT — PAIN DESCRIPTION - PAIN TYPE: TYPE: SURGICAL PAIN

## 2023-05-17 ASSESSMENT — LIFESTYLE VARIABLES: SUBSTANCE_ABUSE: 0

## 2023-05-17 ASSESSMENT — FIBROSIS 4 INDEX: FIB4 SCORE: 2.38

## 2023-05-17 ASSESSMENT — COGNITIVE AND FUNCTIONAL STATUS - GENERAL
STANDING UP FROM CHAIR USING ARMS: A LOT
WALKING IN HOSPITAL ROOM: TOTAL
MOVING FROM LYING ON BACK TO SITTING ON SIDE OF FLAT BED: UNABLE
MOVING TO AND FROM BED TO CHAIR: UNABLE
MOBILITY SCORE: 7
CLIMB 3 TO 5 STEPS WITH RAILING: TOTAL
TURNING FROM BACK TO SIDE WHILE IN FLAT BAD: UNABLE
SUGGESTED CMS G CODE MODIFIER MOBILITY: CM

## 2023-05-17 ASSESSMENT — GAIT ASSESSMENTS: GAIT LEVEL OF ASSIST: UNABLE TO PARTICIPATE

## 2023-05-17 NOTE — PROGRESS NOTES
[employer] [employer] Coverage Progress Note:     I was contacted by the patient's bedside nurse regarding increased agitation and altered mental status.  Patient noted to be febrile with Tmax 102.  Labs reviewed notable for leukocytosis from 10.5 to 14.7 with shift.  Infectious work-up ordered.      Procalcitonin negative.  Chest x-ray without acute cardiopulmonary abnormality.  Blood cultures x2 and UA pending at this time.  Patient started on empiric antibiotics with ceftriaxone.    ROSALBA Rodriguez   [facility] Hospitalist

## 2023-05-17 NOTE — PROGRESS NOTES
"      Orthopaedic Progress Note    Interval changes:  Patient doing well post op  Left hip dressings are CDI  Cleared for DC to rehab by ortho pending medicine clearance    ROS - Patient denies any new issues.  Pain well controlled.    /71   Pulse 68   Temp 36.8 °C (98.2 °F) (Temporal)   Resp 17   Ht 1.905 m (6' 3\")   Wt 112 kg (246 lb 0.5 oz)   SpO2 90%     Patient seen and examined  No acute distress  Breathing non labored  RRR  Left hip surgical dressing is clean, dry, and intact. Patient clearly fires tibialis anterior, EHL, and gastrocnemius/soleus. Sensation is intact to light touch throughout superficial peroneal, deep peroneal, tibial, saphenous, and sural nerve distributions. Strong and palpable 2+ dorsalis pedis and posterior tibial pulses with capillary refill less than 2 seconds. No lower leg tenderness or discomfort.     Recent Labs     05/16/23  0257 05/16/23  2356 05/17/23  0751   WBC 10.5 14.7* 13.2*   RBC 5.27 4.74 4.41*   HEMOGLOBIN 15.9 14.3 13.9*   HEMATOCRIT 48.7 43.4 41.0*   MCV 92.4 91.6 93.0   MCH 30.2 30.2 31.5   MCHC 32.6* 32.9* 33.9   RDW 45.6 46.1 47.7   PLATELETCT 182 162* 147*   MPV 9.5 9.6 9.9       Active Hospital Problems    Diagnosis     Class 1 obesity in adult [E66.9]     Thrombocytopenia ([facility]) [D69.6]     First degree heart block [I44.0]     Syncope and collapse [R55]     Pathological fracture of left femur due to osteoporosis ([facility]) [M80.052A]     Leukocytosis [D72.829]     Neuropathy [G62.9]     Hypertension [I10]        Assessment/Plan:  Patient doing well post op  Left hip dressings are CDI  Cleared for DC to rehab by ortho pending medicine clearance  POD#1 S/P  Left hip hemiarthroplasty (cemented)  Wt bearing status - WBAT with posterior hip precautions  Wound care/Drains - Dressings to be changed every other day by nursing. Or PRN for saturation starting POD#2  Future Procedures - none planned   Lovenox: Start 5/17, Duration-until ambulatory > " 150'  Sutures/Staples out- 14-21 days post operatively. Removal will completed by ortho mid levels only.  PT/OT-initiated  Antibiotics: rocephin 2g IV QD  DVT Prophylaxis- TEDS/SCDs/Foot pumps  Gallardo-not needed per ortho  Case Coordination for Discharge Planning - Disposition per therapy recs.

## 2023-05-17 NOTE — DISCHARGE PLANNING
Care Transition Team Assessment    Information Source  Orientation Level: Oriented X4  Information Given By: Patient  Informant's Name: Andreas Ortega  Who is responsible for making decisions for patient? : Patient    Readmission Evaluation  Is this a readmission?: No    Elopement Risk  Legal Hold: No  Ambulatory or Self Mobile in Wheelchair: No-Not an Elopement Risk  Elopement Risk: Not at Risk for Elopement    Interdisciplinary Discharge Planning  [facility] Phys[facility]: [facility]  Lives with - Patient's Self Care Capacity: Spouse  Patient or legal guardian wants to designate a caregiver: No  Support Systems: Spouse / Significant Other, Family Member(s)  Housing / Facility: 1 Story House (2-3 stairs)  Do You Take your Prescribed Medications Regularly: Yes  Able to Return to Previous ADL's: Future Time w/Therapy  Mobility Issues: No (not previously)  Prior Services: None, Home-Independent  Patient Prefers to be Discharged to:: IPR  Assistance Needed: Unknown at this Time  Durable Medical Equipment: Walker, Other - Specify (cane - both from prior knee replacement 2 years ago) Patient does not use     Discharge Preparedness  What is your plan after discharge?: Other (comment) (IPR)    Finances  Financial Barriers to Discharge: No  Prescription Coverage: Yes ([facility])    Vision / Hearing Impairment  Vision Impairment : Yes  Right Eye Vision: Wears Glasses, Impaired  Left Eye Vision: Wears Glasses, Impaired  Hearing Impairment : Yes  Hearing Impairment: Left Ear  Does Pt Need Special Equipment for the Hearing Impaired?: No    Values / Beliefs / Concerns  Values / Beliefs Concerns : No    Advance Directive  Advance Directive?: DPOA for Health Care  Durable Power of  Name and Contact : wife Ileana Ortega [phone]    Domestic Abuse  Have you ever been the victim of abuse or violence?: No  Physical Abuse or Sexual Abuse: No  Verbal Abuse or Emotional Abuse: No  Possible Abuse/Neglect Reported to:: Not  Applicable    Discharge Risks or Barriers  Discharge risks or barriers?: Complex medical needs    Anticipated Discharge Information  Discharge Disposition: D/T to IP rehab facility w/planned hosp IP readmit (90)      RN CM met with patient at bedside.  Patient was independent at home, managing his own medications, driving to appointments. Patient lives in a one story home with his wife Hannah.    Patient wishes to go to [facility] .  Choice sent.      Rona PEÑA RN Case Manager  [phone]

## 2023-05-17 NOTE — PROGRESS NOTES
[facility] Medicine Daily Progress Note    Date of Service  5/17/2023    Chief Complaint  Andreas Ortega is a 72 y.o. male admitted 5/15/2023 with syncope and hip fracture    Hospital Course  No notes on file    Interval Problem Update  Patient was seen and examined at bedside.  Overnight events noted. Patient is resting comfortably in bed and in no acute distress.     Pain control  Await PM&R consult    I have discussed this patient's plan of care and discharge plan at IDT rounds today with Case Management, Nursing, Nursing leadership, and other members of the IDT team.    Consultants/Specialty  orthopedics    Code Status  Full Code    Disposition  The patient is medically cleared for discharge to home or a post-acute facility.  Anticipate discharge to: an inpatient rehabilitation hospital    I have placed the appropriate orders for post-discharge needs.    Review of Systems  Review of Systems   Constitutional:  Negative for chills and fever.   HENT:  Negative for congestion and sore throat.    Eyes:  Negative for blurred vision and double vision.   Respiratory:  Negative for cough and shortness of breath.    Cardiovascular:  Negative for chest pain and palpitations.   Gastrointestinal:  Negative for abdominal pain, diarrhea, nausea and vomiting.   Genitourinary:  Negative for dysuria and frequency.   Musculoskeletal:  Positive for falls and joint pain.   Skin:  Negative for rash.   Neurological:  Negative for seizures, loss of consciousness and headaches.   Psychiatric/Behavioral:  Negative for hallucinations and substance abuse.         Physical Exam  Temp:  [36.8 °C (98.2 °F)-37.2 °C (99 °F)] 36.8 °C (98.2 °F)  Pulse:  [60-89] 68  Resp:  [16-18] 17  BP: ()/(54-79) 119/71  SpO2:  [90 %-97 %] 90 %    Physical Exam  Vitals and nursing note reviewed.   Constitutional:       General: He is not in acute distress.     Appearance: He is not toxic-appearing.   HENT:      Head: Normocephalic.      Right Ear: External  ear normal.      Left Ear: External ear normal.      Nose: No congestion.      Mouth/Throat:      Mouth: Mucous membranes are moist.      Pharynx: No oropharyngeal exudate.   Eyes:      General: No scleral icterus.     Pupils: Pupils are equal, round, and reactive to light.   Cardiovascular:      Rate and Rhythm: Normal rate and regular rhythm.      Heart sounds: No murmur heard.  Pulmonary:      Breath sounds: No wheezing.   Abdominal:      Palpations: Abdomen is soft.      Tenderness: There is no abdominal tenderness. There is no guarding or rebound.   Musculoskeletal:         General: No swelling or deformity.      Comments: Post surgical changes to left hip  Left lower extremity neurovascularly in tact   Skin:     Capillary Refill: Capillary refill takes less than 2 seconds.      Coloration: Skin is not jaundiced.      Findings: No bruising.   Neurological:      General: No focal deficit present.      Mental Status: He is alert.      Motor: No weakness.   Psychiatric:         Mood and Affect: Mood normal.         Behavior: Behavior normal.         Fluids    Intake/Output Summary (Last 24 hours) at 5/17/2023 1406  Last data filed at 5/17/2023 1200  Gross per 24 hour   Intake 650 ml   Output 1300 ml   Net -650 ml       Laboratory  Recent Labs     05/16/23  0257 05/16/23  2356 05/17/23  0751   WBC 10.5 14.7* 13.2*   RBC 5.27 4.74 4.41*   HEMOGLOBIN 15.9 14.3 13.9*   HEMATOCRIT 48.7 43.4 41.0*   MCV 92.4 91.6 93.0   MCH 30.2 30.2 31.5   MCHC 32.6* 32.9* 33.9   RDW 45.6 46.1 47.7   PLATELETCT 182 162* 147*   MPV 9.5 9.6 9.9     Recent Labs     05/16/23  0257 05/16/23  2356 05/17/23  0751   SODIUM 139 133* 136   POTASSIUM 4.9 4.2 4.1   CHLORIDE 100 98 101   CO2 26 25 25   GLUCOSE 122* 145* 151*   BUN 18 19 21   CREATININE 0.89 1.13 1.17   CALCIUM 9.1 8.7 8.5     Recent Labs     05/16/23  0257   INR 1.10               Imaging  DX-CHEST-PORTABLE (1 VIEW)   Final Result      No acute cardiac or pulmonary abnormalities  are identified.      EC-ECHOCARDIOGRAM COMPLETE W/ CONT   Final Result      DX-CHEST-PORTABLE (1 VIEW)   Final Result      1.  Low lung volumes.   2.  Hypoventilatory changes versus interstitial infiltrates and/or edema.   3.  Cardiomegaly.      DX-HIP-UNILATERAL-WITH PELVIS-1 VIEW LEFT   Final Result         Acute comminuted and displaced fracture of the left femoral neck.           Assessment/Plan  * Pathological fracture of left femur due to osteoporosis (HCC)  Assessment & Plan  L hip fracture, acute.  As demonstrated on imaging  S/p left hip hemiarthroplasty 05/16  Pain control  Physiatry consult for IPR candidacy       First degree heart block  Assessment & Plan  Severe first degree heart block.  -telemetry monitoring  -consideration of cardiology consultation if more severe heart block observed during admission    Syncope and collapse- (present on admission)  Assessment & Plan  Patient fell due to lightheadedness - does not appear to have LOC  Patient reports history of afib although not on any rate control, [facility]  EKG demonstrates ectopic atrial rhythy, RBBB, 1st degree AV block  -telemetry  Echo reviewed  Cortisol is not necessarily consistent with AI      Thrombocytopenia (HCC)  Assessment & Plan  Plt 147  monitor    Class 1 obesity in adult  Assessment & Plan  Due to excess caloric intake    Neuropathy  Assessment & Plan  Chronic neuropathy.  -Gabapentin 100mg PO BID/PRN    Leukocytosis  Assessment & Plan  Leukocytosis, most likely reactive.  -CBC recheck    Hypertension- (present on admission)  Assessment & Plan  HTN history, elevated on presentation. Antihypertensives held per anticipated operative management.  -Hydralazine IV PRN, SBP goals of <170  Restart HCTZ         VTE prophylaxis: SCDs/TEDs and enoxaparin ppx    I have performed a physical exam and reviewed and updated ROS and Plan today (5/17/2023). In review of yesterday's note (5/16/2023), there are no changes except as documented  above.

## 2023-05-17 NOTE — CARE PLAN
"Pt had an episode of confusion today starting around 1700. Although A&Ox4, he had some word finding difficulty at times and reports \"his meds make him feel funny.\" (See MAR) MD notified and assessed pt at bedside. Will continue to monitor this closely.     Otherwise, no acute events today. VSS on RA-1LNC. C/o soreness, but no PRN medications given. PT eval completed. OOB with Ax1-2 with FWW to chair. OR dressing CDI. CMS intact. Tolerating regular diet. Urinal at bedside for use. Plan for [facility] once medically cleared.        Problem: Pain - Standard  Goal: Alleviation of pain or a reduction in pain to the patient’s comfort goal  Outcome: Progressing     Problem: Knowledge Deficit - Standard  Goal: Patient and family/care givers will demonstrate understanding of plan of care, disease process/condition, diagnostic tests and medications  Outcome: Progressing     Problem: Fall Risk  Goal: Patient will remain free from falls  Outcome: Progressing     Problem: Hemodynamics  Goal: Patient's hemodynamics, fluid balance and neurologic status will be stable or improve  Outcome: Progressing     Problem: Fluid Volume  Goal: Fluid volume balance will be maintained  Outcome: Progressing     Problem: Urinary - Renal Perfusion  Goal: Ability to achieve and maintain adequate renal perfusion and functioning will improve  Outcome: Progressing     Problem: Respiratory  Goal: Patient will achieve/maintain optimum respiratory ventilation and gas exchange  Outcome: Progressing     Problem: Mechanical Ventilation  Goal: Safe management of artificial airway and ventilation  Outcome: Progressing  Goal: Successful weaning off mechanical ventilator, spontaneously maintains adequate gas exchange  Outcome: Progressing  Goal: Patient will be able to express needs and understand communication  Outcome: Progressing     Problem: Physical Regulation  Goal: Diagnostic test results will improve  Outcome: Progressing  Goal: Signs and symptoms of " infection will decrease  Outcome: Progressing     Problem: Skin Integrity  Goal: Skin integrity is maintained or improved  Outcome: Progressing

## 2023-05-17 NOTE — THERAPY
Physical Therapy   Initial Evaluation     Patient Name: Andreas Ortega  Age:  72 y.o., Sex:  male  Medical Record #: [id number]  Today's Date: 5/17/2023     Precautions  Precautions: (P) Fall Risk;Posterior Hip Precautions;Toe Touch Weight Bearing Left Lower Extremity    Assessment  Patient is 72 y.o. male with a diagnosis of Syncope and collapse with L femoral neck fracture, and underwent L hip hemiarthroplasty on 5/16    Pt tolerated session well considering his acute medical state. He required MOD A for bed mobility and MOD A for transfers however he performed multiple sit<>stand transfers during today's session improving to one transfer with MIN A. Pt was unable to attempt ambulation due to bilateral knee buckling during transfers. Pt is highly motivated and participatory in session. Pt presents with impaired activity tolerance, impaired strength/ROM, and impaired functional independence. He will benefit from acute PT services to improve his functional independence, improve his strength and activity tolerance.     Pt is an excellent inpatient rehab candidate. He can tolerate 3 hours of therapy each day. He was highly active prior to this fracture playing golf multiple times per week. He will benefit from discharge to [facility].     Pt was educated on posterior hip precautions and at session was able to verbalize 2/3 precautions. They were reviewed again at end of session.     Plan    Physical Therapy Initial Treatment Plan   Treatment Plan : (P) Bed Mobility, Debridement, Equipment, Group Therapy, Gait Training, Manual Therapy, Neuro Re-Education / Balance, Self Care / Home Evaluation, Stair Training, Therapeutic Activities, Therapeutic Exercise  Treatment Frequency: (P) 5 Times per Week  Duration: (P) Until Therapy Goals Met    DC Equipment Recommendations: (P) Unable to determine at this time  Discharge Recommendations: (P) Recommend post-acute placement for additional physical therapy services prior to discharge home        Subjective    Pt is pleasant and cooperative. He is hard of hearing.      Objective       05/17/23 0906   Initial Contact Note    Initial Contact Note Order Received and Verified, Physical Therapy Evaluation in Progress with Full Report to Follow.   Precautions   Precautions Fall Risk;Posterior Hip Precautions;Toe Touch Weight Bearing Left Lower Extremity   Pain 0 - 10 Group   Location Hip   Location Orientation Left   Pain Rating Scale (NPRS) 7   Description Aching   Prior Living Situation   Housing / Facility 1 Story House   Steps Into Home 2   Steps In Home 0   Equipment Owned Front-Wheel Walker;Single Point Cane   Lives with - Patient's Self Care Capacity Spouse   Prior Level of Functional Mobility   Bed Mobility Independent   Transfer Status Independent   Ambulation Independent   Ambulation Distance community   Assistive Devices Used None   Stairs Independent   Passive ROM Lower Body   Comments LLE significantly limited   Active ROM Lower Body    Comments LLE limited   Strength Lower Body   Lower Body Strength  X   Rt Hip Flexion Strength 5 (N)   Rt Knee Extension Strength 5 (N)   Rt Ankle Dorsiflexion Strength 5 (N)   Lt Hip Flexion Strength 2 (P)   Lt Knee Extension Strength 3 (F)   Lt Ankle Dorsiflexion Strength 3+ (F+)   Balance Assessment   Sitting Balance (Static) Fair +   Sitting Balance (Dynamic) Fair -   Standing Balance (Static) Poor   Standing Balance (Dynamic) Poor -   Weight Shift Sitting Fair   Weight Shift Standing Poor   Bed Mobility    Supine to Sit Moderate Assist   Gait Analysis   Gait Level Of Assist Unable to Participate   Functional Mobility   Sit to Stand Moderate Assist   Bed, Chair, Wheelchair Transfer Moderate Assist   Comments Pt performed multiple sit<>stand transfers from both edge of bed and from bedside chair initially with MOD A progressing to MIN-MOD A.   How much difficulty does the patient currently have...   Turning over in bed (including adjusting bedclothes, sheets and  blankets)? 1   Sitting down on and standing up from a chair with arms (e.g., wheelchair, bedside commode, etc.) 1   Moving from lying on back to sitting on the side of the bed? 1   How much help from another person does the patient currently need...   Moving to and from a bed to a chair (including a wheelchair)? 2   Need to walk in a hospital room? 1   Climbing 3-5 steps with a railing? 1   6 clicks Mobility Score 7   Short Term Goals    Short Term Goal # 1 Pt will perform bed mobility with MIN A   Short Term Goal # 2 Pt will perform sit<>stand and stand pivot transfers with FWW and MIN A.   Short Term Goal # 3 Pt will ambulate 20' with FWW and MIN A.   Education Group   Education Provided Role of Physical Therapist;Hip Precautions Posterior   Hip Precautions Posterior Patient Response Patient;Acceptance;Explanation;Demonstration;Verbal Demonstration   Role of Physical Therapist Patient Response Patient;Acceptance;Explanation;Demonstration;Verbal Demonstration;Action Demonstration   Physical Therapy Initial Treatment Plan    Treatment Plan  Bed Mobility;Debridement;Equipment;Group Therapy;Gait Training;Manual Therapy;Neuro Re-Education / Balance;Self Care / Home Evaluation;Stair Training;Therapeutic Activities;Therapeutic Exercise   Treatment Frequency 5 Times per Week   Duration Until Therapy Goals Met   Problem List    Problems Pain;Impaired Bed Mobility;Impaired Transfers;Impaired Ambulation;Functional ROM Deficit;Impaired Balance;Functional Strength Deficit;Decreased Activity Tolerance;Safety Awareness Deficits / Cognition;Limited Knowledge of Post-Op Precautions   Anticipated Discharge Equipment and Recommendations   DC Equipment Recommendations Unable to determine at this time   Discharge Recommendations Recommend post-acute placement for additional physical therapy services prior to discharge home   Interdisciplinary Plan of Care Collaboration   IDT Collaboration with  Nursing   Patient Position at End of  Therapy Seated;Chair Alarm On;Phone within Reach;Tray Table within Reach;Call Light within Reach   Session Information   Date / Session Number  5/17-1 (1/5, 5/23)

## 2023-05-17 NOTE — CARE PLAN
Pt had successful L hemiarthroplasty today. Arrived back to T-[id number] around 1000. Very calm/cooperative and pleasant. A&Ox4. VSS on 2LNC (baseline is RA). C/o surgical pain 5/10, but denies any PRN medication. OR dressing CDI. CMS intact. Tolerating regular diet. Urinal at bedside. PT eval completed and plans for increased activity POD1. Will continue to monitor.     Problem: Pain - Standard  Goal: Alleviation of pain or a reduction in pain to the patient’s comfort goal  Outcome: Progressing     Problem: Knowledge Deficit - Standard  Goal: Patient and family/care givers will demonstrate understanding of plan of care, disease process/condition, diagnostic tests and medications  Outcome: Progressing     Problem: Fall Risk  Goal: Patient will remain free from falls  Outcome: Progressing

## 2023-05-17 NOTE — CARE PLAN
The patient is Watcher - Medium risk of patient condition declining or worsening    Shift Goals  Clinical Goals: Monitor s/p hip repair,pain management  Patient Goals: Sleep comfortably  Family Goals: comfort, rest    Progress made toward(s) clinical / shift goals:      Patient is confused throughout the night, agitated, pulled lines, kept trying to get out of bed and needed to be reoriented frequently. Bed alarm is on.   Put on 3L of o2, desats in the 80's in RA. Had temp of 102 around midnight, scheduled tylenol and cold pack given. Hospitalist Maranda Willams aware of changes in condition. Administered haldol x1. No retention on bladder scan. CXR, lactic acid, bcx, UA, MRSA, abx and lyte replacement are ordered.     Fever resolved. Appears more calm and resting at this time.     Patient is not progressing towards the following goals:

## 2023-05-17 NOTE — DISCHARGE PLANNING
2054  Received Choice form at 3861  Agency/Facility Name: [facility]   Referral sent per Choice form @ [phone]

## 2023-05-17 NOTE — PROGRESS NOTES
POD 1 s/p left hip hemiarthroplasty  Altered overnight, increased WBC  Infectious w/u pending    WBAT LLE  PT/OT eval  Medical management per primary  DVT ppx per primary, ok to restart from ortho pov      Denver Carrillo MD  Orthopedic Trauma Surgery

## 2023-05-17 NOTE — DISCHARGE PLANNING
PM&R referral from Dr. iFtzgerald. GLF resulting in a displaced left femoral neck fracture. Required left hip hemiarthroplasty DOS 05/16/2023. WBAT posterior precautions. Spoke with Andreas and Ileana about goals and expectation for IRF level of care. Discussed therapy plan for 3 hours per day 5 days a week. Discussed therapy schedules 30/45 or 60 minute sessions typically 1.5 hours between breakfast and lunch and another 1.5 hours between lunch and dinner. Discussed PT/OT and SLP roles. Discussed potential length of stay. Discussed insurance [facility] coverage for the program. Discussed Covid visitation and clothing requirements. Ileana will be primary support for Andreas to return to the community. Discussed participation with therapy program when visiting.  Family goal for MOD for Mobility and self care.  Home is a single story with 2-3 step to enter.  Discussed discharge planner role and team conference. DME in the home includes none  In the event of additional support need outpatient in [place]. Ileana has Lupus and is limited by fatigue and can provide limited physical support. Ileana does drive and is able to commit to family training with a day or to prior notice. Ileana voiced concern about narcotic sensitivity. Both Ileana and Andreas are agreeable to transfer to [facility] for post acute services if approved. Physiatry to consult per protocol.

## 2023-05-18 ENCOUNTER — APPOINTMENT (OUTPATIENT)
Dept: RADIOLOGY | Facility: MEDICAL CENTER | Age: 73
DRG: 522 | End: 2023-05-18
Payer: COMMERCIAL

## 2023-05-18 PROBLEM — D62 ACUTE BLOOD LOSS ANEMIA: Status: ACTIVE | Noted: 2023-05-18

## 2023-05-18 LAB
ALBUMIN SERPL BCP-MCNC: 3.3 G/DL (ref 3.2–4.9)
ALBUMIN/GLOB SERPL: 1.5 G/DL
ALP SERPL-CCNC: 63 U/L (ref 30–99)
ALT SERPL-CCNC: 19 U/L (ref 2–50)
ANION GAP SERPL CALC-SCNC: 13 MMOL/L (ref 7–16)
AST SERPL-CCNC: 30 U/L (ref 12–45)
BASOPHILS # BLD AUTO: 0.6 % (ref 0–1.8)
BASOPHILS # BLD: 0.06 K/UL (ref 0–0.12)
BILIRUB SERPL-MCNC: 1.9 MG/DL (ref 0.1–1.5)
BUN SERPL-MCNC: 22 MG/DL (ref 8–22)
CALCIUM ALBUM COR SERPL-MCNC: 9.1 MG/DL (ref 8.5–10.5)
CALCIUM SERPL-MCNC: 8.5 MG/DL (ref 8.5–10.5)
CHLORIDE SERPL-SCNC: 100 MMOL/L (ref 96–112)
CO2 SERPL-SCNC: 26 MMOL/L (ref 20–33)
CREAT SERPL-MCNC: 1.1 MG/DL (ref 0.5–1.4)
EOSINOPHIL # BLD AUTO: 0.17 K/UL (ref 0–0.51)
EOSINOPHIL NFR BLD: 1.6 % (ref 0–6.9)
ERYTHROCYTE [DISTWIDTH] IN BLOOD BY AUTOMATED COUNT: 48 FL (ref 35.9–50)
GFR SERPLBLD CREATININE-BSD FMLA CKD-EPI: 70 ML/MIN/1.73 M 2
GLOBULIN SER CALC-MCNC: 2.2 G/DL (ref 1.9–3.5)
GLUCOSE SERPL-MCNC: 112 MG/DL (ref 65–99)
HCT VFR BLD AUTO: 41.1 % (ref 42–52)
HGB BLD-MCNC: 13.7 G/DL (ref 14–18)
IMM GRANULOCYTES # BLD AUTO: 0.05 K/UL (ref 0–0.11)
IMM GRANULOCYTES NFR BLD AUTO: 0.5 % (ref 0–0.9)
LYMPHOCYTES # BLD AUTO: 1.47 K/UL (ref 1–4.8)
LYMPHOCYTES NFR BLD: 13.7 % (ref 22–41)
MCH RBC QN AUTO: 30.9 PG (ref 27–33)
MCHC RBC AUTO-ENTMCNC: 33.3 G/DL (ref 33.7–35.3)
MCV RBC AUTO: 92.8 FL (ref 81.4–97.8)
MONOCYTES # BLD AUTO: 1.28 K/UL (ref 0–0.85)
MONOCYTES NFR BLD AUTO: 11.9 % (ref 0–13.4)
NEUTROPHILS # BLD AUTO: 7.69 K/UL (ref 1.82–7.42)
NEUTROPHILS NFR BLD: 71.7 % (ref 44–72)
NRBC # BLD AUTO: 0 K/UL
NRBC BLD-RTO: 0 /100 WBC
PLATELET # BLD AUTO: 146 K/UL (ref 164–446)
PMV BLD AUTO: 10 FL (ref 9–12.9)
POTASSIUM SERPL-SCNC: 3.7 MMOL/L (ref 3.6–5.5)
PROCALCITONIN SERPL-MCNC: 0.41 NG/ML
PROT SERPL-MCNC: 5.5 G/DL (ref 6–8.2)
RBC # BLD AUTO: 4.43 M/UL (ref 4.7–6.1)
SODIUM SERPL-SCNC: 139 MMOL/L (ref 135–145)
WBC # BLD AUTO: 10.7 K/UL (ref 4.8–10.8)

## 2023-05-18 PROCEDURE — 700111 HCHG RX REV CODE 636 W/ 250 OVERRIDE (IP)

## 2023-05-18 PROCEDURE — 36415 COLL VENOUS BLD VENIPUNCTURE: CPT

## 2023-05-18 PROCEDURE — 700102 HCHG RX REV CODE 250 W/ 637 OVERRIDE(OP): Performed by: STUDENT IN AN ORGANIZED HEALTH CARE EDUCATION/TRAINING PROGRAM

## 2023-05-18 PROCEDURE — 700102 HCHG RX REV CODE 250 W/ 637 OVERRIDE(OP): Performed by: INTERNAL MEDICINE

## 2023-05-18 PROCEDURE — 70450 CT HEAD/BRAIN W/O DYE: CPT

## 2023-05-18 PROCEDURE — 85025 COMPLETE CBC W/AUTO DIFF WBC: CPT

## 2023-05-18 PROCEDURE — 770001 HCHG ROOM/CARE - MED/SURG/GYN PRIV*

## 2023-05-18 PROCEDURE — 99232 SBSQ HOSP IP/OBS MODERATE 35: CPT | Performed by: INTERNAL MEDICINE

## 2023-05-18 PROCEDURE — 84145 PROCALCITONIN (PCT): CPT

## 2023-05-18 PROCEDURE — 99223 1ST HOSP IP/OBS HIGH 75: CPT | Performed by: PHYSICAL MEDICINE & REHABILITATION

## 2023-05-18 PROCEDURE — 700111 HCHG RX REV CODE 636 W/ 250 OVERRIDE (IP): Performed by: INTERNAL MEDICINE

## 2023-05-18 PROCEDURE — A9270 NON-COVERED ITEM OR SERVICE: HCPCS | Performed by: INTERNAL MEDICINE

## 2023-05-18 PROCEDURE — A9270 NON-COVERED ITEM OR SERVICE: HCPCS | Performed by: STUDENT IN AN ORGANIZED HEALTH CARE EDUCATION/TRAINING PROGRAM

## 2023-05-18 PROCEDURE — 99024 POSTOP FOLLOW-UP VISIT: CPT | Performed by: STUDENT IN AN ORGANIZED HEALTH CARE EDUCATION/TRAINING PROGRAM

## 2023-05-18 PROCEDURE — 80053 COMPREHEN METABOLIC PANEL: CPT

## 2023-05-18 RX ADMIN — ACETAMINOPHEN 1000 MG: 500 TABLET, FILM COATED ORAL at 23:29

## 2023-05-18 RX ADMIN — PREGABALIN 150 MG: 150 CAPSULE ORAL at 04:49

## 2023-05-18 RX ADMIN — CEFTRIAXONE SODIUM 2000 MG: 10 INJECTION, POWDER, FOR SOLUTION INTRAVENOUS at 04:49

## 2023-05-18 RX ADMIN — ACETAMINOPHEN 1000 MG: 500 TABLET, FILM COATED ORAL at 11:53

## 2023-05-18 RX ADMIN — ENOXAPARIN SODIUM 40 MG: 100 INJECTION SUBCUTANEOUS at 17:28

## 2023-05-18 RX ADMIN — METOPROLOL TARTRATE 25 MG: 25 TABLET, FILM COATED ORAL at 17:22

## 2023-05-18 RX ADMIN — ANTACID TABLETS 500 MG: 500 TABLET, CHEWABLE ORAL at 16:30

## 2023-05-18 RX ADMIN — SENNOSIDES AND DOCUSATE SODIUM 2 TABLET: 50; 8.6 TABLET ORAL at 17:22

## 2023-05-18 RX ADMIN — PREGABALIN 150 MG: 150 CAPSULE ORAL at 17:22

## 2023-05-18 RX ADMIN — ALLOPURINOL 150 MG: 300 TABLET ORAL at 04:49

## 2023-05-18 RX ADMIN — ANTACID TABLETS 500 MG: 500 TABLET, CHEWABLE ORAL at 19:46

## 2023-05-18 RX ADMIN — ANTACID TABLETS 500 MG: 500 TABLET, CHEWABLE ORAL at 11:53

## 2023-05-18 RX ADMIN — ACETAMINOPHEN 1000 MG: 500 TABLET, FILM COATED ORAL at 04:49

## 2023-05-18 RX ADMIN — ACETAMINOPHEN 1000 MG: 500 TABLET, FILM COATED ORAL at 17:23

## 2023-05-18 RX ADMIN — HYDROCHLOROTHIAZIDE 12.5 MG: 25 TABLET ORAL at 04:49

## 2023-05-18 ASSESSMENT — ENCOUNTER SYMPTOMS
DOUBLE VISION: 0
BLURRED VISION: 0
ABDOMINAL PAIN: 0
FEVER: 0
NAUSEA: 0
LOSS OF CONSCIOUSNESS: 0
VOMITING: 0
COUGH: 0
HALLUCINATIONS: 0
PALPITATIONS: 0
DIARRHEA: 0
SEIZURES: 0
SHORTNESS OF BREATH: 0
HEADACHES: 0
CHILLS: 0
FALLS: 1
SORE THROAT: 0

## 2023-05-18 ASSESSMENT — LIFESTYLE VARIABLES: SUBSTANCE_ABUSE: 0

## 2023-05-18 ASSESSMENT — FIBROSIS 4 INDEX: FIB4 SCORE: 3.49

## 2023-05-18 NOTE — CARE PLAN
The patient is Stable - Low risk of patient condition declining or worsening    Shift Goals  Clinical Goals: Rest, monitor hip  Patient Goals: pt/ot, rest  Family Goals: JESUS    Progress made toward(s) clinical / shift goals:      Problem: Pain - Standard  Goal: Alleviation of pain or a reduction in pain to the patient’s comfort goal  Description: Target End Date:  Prior to discharge or change in level of care    Document on Vitals flowsheet    1.  Document pain using the appropriate pain scale per order or unit policy  2.  Educate and implement non-pharmacologic comfort measures (i.e. relaxation, distraction, massage, cold/heat therapy, etc.)  3.  Pain management medications as ordered  4.  Reassess pain after pain med administration per policy  5.  If opiods administered assess patient's response to pain medication is appropriate per POSS sedation scale  6.  Follow pain management plan developed in collaboration with patient and interdisciplinary team (including palliative care or pain specialists if applicable)  Outcome: Progressing     Problem: Knowledge Deficit - Standard  Goal: Patient and family/care givers will demonstrate understanding of plan of care, disease process/condition, diagnostic tests and medications  Description: Target End Date:  1-3 days or as soon as patient condition allows    Document in Patient Education    1.  Patient and family/caregiver oriented to unit, equipment, visitation policy and means for communicating concern  2.  Complete/review Learning Assessment  3.  Assess knowledge level of disease process/condition, treatment plan, diagnostic tests and medications  4.  Explain disease process/condition, treatment plan, diagnostic tests and medications  Outcome: Progressing     Problem: Fall Risk  Goal: Patient will remain free from falls  Description: Target End Date:  Prior to discharge or change in level of care    Document interventions on the Isabela Sanchez Fall Risk Assessment    1.   Assess for fall risk factors  2.  Implement fall precautions  Outcome: Progressing     Patient is not progressing towards the following goals:

## 2023-05-18 NOTE — HOSPITAL COURSE
Andreas is a 72M presenting with severe acute L hip pain after syncopal episode; pmhx includes atrial fibrillation (no AC/meds, [facility]), HTN, DM2, prostate CA (s/p prostatectomy), R renal CA (s/p R nephrectomy). Patient presents with episode of lightheadedness resulting in fall. May have been precipitated by irregular heart rhythm. Patient found to have left femur fracture which was repaired by orthopedic surgery. Patient placed on lopressor with no arrhythmias noted while inpatient. CT head negative for acute process. 2D echo with EF 65%. Patient improved clinically. PT/OT recommended home with home health which case management assisted in setting up. Patient was determined satisfactory for discharge with appropriate follow up.

## 2023-05-18 NOTE — PROGRESS NOTES
[employer] Coverage Progress Note:     Patient is demanding to receive premedication before CT. Nursing has provided education to the patient that the test is short and that he should not require any medication to complete, however the patient still adamant to receive pre-medication. Xanax 0.25 mg ordered once in order to obtain diagnostic imaging.     ROSALBA Rodriguez   [facility] Hospitalist

## 2023-05-18 NOTE — CARE PLAN
The patient is Stable - Low risk of patient condition declining or worsening    Shift Goals  Clinical Goals: No confusion  Patient Goals: Sleep  Family Goals: JESUS    Progress made toward(s) clinical / shift goals:    Problem: Pain - Standard  Goal: Alleviation of pain or a reduction in pain to the patient’s comfort goal  Outcome: Progressing     Problem: Fall Risk  Goal: Patient will remain free from falls  Outcome: Progressing   Discussed plan of care with patient, questions answered. Pt complains of 1/10 left hip pain, relieved by cold compress. Pt remains free of falls.

## 2023-05-18 NOTE — PREADMISSION SCREENING NOTE
Pre-Admission Screening Form    Patient Information:   Name: Andreas Ortega     MRN: [id number]       : 1950      Age: 72 y.o.   Gender: male      Race: White [7]       Marital Status:  [2]  Family Contact: Ileana Ortega        Relationship: Spouse [17]  Home Phone: [phone]           Cell Phone:   Advanced Directives: None  Code Status:  FULL  Current Attending Provider: Joseph Fitzgerald D.O.  Referring Physician: Dr. Fitzgerald       Physiatrist Consult: Dr. Nolasco       Referral Date: 2023  Primary Payor Source:  [facility]  Secondary Payor Source:  [facility]    Medical Information:   Date of Admission to Acute Care Settin/15/2023  Room Number: T736/02  Rehabilitation Diagnosis: 0008.11 - Orthopaedic Disorders: Status Post Unilateral Hip Fracture  Immunization History   Administered Date(s) Administered    Influenza Vac Subunit Quad Inj (Pf) 2023     Allergies   Allergen Reactions    Crestor [Rosuvastatin Calcium]     Levaquin     Metformin     Naproxen     Pcn [Penicillins]     Pravastatin     Simvastatin     Statins [Hmg-Coa-R Inhibitors]      Past Medical History:   Diagnosis Date    Arthritis     hands, knees, shoulders    Backpain     Cancer (HCC)     prostectomy, R kidney removal, skin    Diabetes     borderline    Fall     3 days ago    Hypertension     Prostate cancer (HCC)     Renal cancer (HCC)     Skin cancer      Past Surgical History:   Procedure Laterality Date    PB PARTIAL HIP REPLACEMENT  2023    Procedure: HEMIARTHROPLASTY, HIP;  Surgeon: Denver Carrillo M.D.;  Location: [facility];  Service: Orthopedics    GASTROSCOPY WITH BIOPSY  2012    Performed by OBDULIO ALEJANDRA at [facility] ORS    EGD W/ENDOSCOPIC ULTRASOUND  2012    Performed by OBDULIO ALEJANDRA at [facility] ORS    ERCP W/SPHINCTEROTOMY/PAPILL.  2012    Performed by [facility] at [facility]    ERCP W/REMOVAL CALCULUS  2012    Performed  by OBDULIO ALEJANDRA at SURGERY [facility] ORS    PROSTATECTOMY, RADIAL  2005    OTHER  1984    Right kidney removed    OTHER ORTHOPEDIC SURGERY  1974    R knee surgery, repair    APPENDECTOMY  1966       History Leading to Admission, Conditions that Caused the Need for Rehab (CMS):     Physician  Surgery Orthopedic  Consults      Signed  Date of Service:  5/16/2023  7:08 AM    Expand All Collapse All      5/16/2023     Time Called: 0300  Time Arrived: 0700        HPI: Andreas Ortega is a 72 y.o. male who presents with left hip pain after syncopal fall last evening.  He does have a history of atrial fibrillation.  He noticed dizzy feeling when he was trying to make it to the bed but was unable to.  Also of note he has history of borderline diabetes and prostate cancer now in remission.  He is brought into the ED where initial work-up revealed displaced left femoral neck fracture.  He has been unable to ambulate since the fall.       Denver Carrlilo M.D.  Physician  Surgery Orthopedic  OP Report      Signed  Date of Service:  5/16/2023  7:15 AM        DATE OF OPERATION: 5/16/2023     PREOPERATIVE DIAGNOSIS: Displaced left femoral neck fracture     POSTOPERATIVE DIAGNOSIS: Same     PROCEDURE PERFORMED: Left hip hemiarthroplasty (cemented)     SURGEON: Denver Carrillo M.D.      ASSISTANT: Chester Torres MD, fellow     ANESTHESIA: General     SPECIMEN: None     ESTIMATED BLOOD LOSS: 30 mL    POSTOPERATIVE PLAN: Weightbearing as tolerated with posterior hip precautions left lower extremity weight bearing.  Mobilize with physical and occupational therapies.  DVT prophylaxis with SCDs and home anticoagulation medication starting 12 hours after surgery.  The patient will follow up in clinic in 2 weeks to check wounds and remove sutures/staples.        ____________________________________   Denver Carrillo M.D.   DD: 5/16/2023  8:32 AM    Chris Nolasco D.O.  Physician  Physical Medicine & Rehab  Consults       Addendum  Date of Service:  5/18/2023  8:02 AM  Consult Orders  IP Consult For Physiatry [[id number]] ordered by Joseph Fitzgerald D.O. at 05/17/23 1150       Addendum      Expand All Collapse All                                                      Physical Medicine and Rehabilitation Consultation                                                                                  Date of initial consultation: 5/18/2023  Consulting provider: Joseph Fitzgerald D.O.  Reason for consultation: assess for acute inpatient rehab appropriateness  LOS: 3 Day(s)     Chief complaint: left hip pain      HPI: The patient is a 72 y.o. right hand dominant male with a past medical history of atrial fibrillation, renal cell carcinoma status post right nephrectomy, diabetes, hypertension, prostate cancer;  who presented on 5/15/2023  4:02 PM with left hip pain after syncopal episodes.  Work-up in the ED found left femoral neck fracture.  Patient was brought to the OR by Dr. Denver Carrillo MD on 5/16/2023 for left hip hemiarthroplasty.  Patient is weightbearing as tolerated with posterior precautions.  Etiology of fracture is felt to be pathologic due to osteoporosis.  Postoperatively, patient leukocytosis, which is now resolved.  Procalcitonin remains mildly elevated.     The patient currently reports overall doing well, if he does not move.  Patient reports he has left hip pain with mobility.  Patient states he would like to participate in more therapy.  Patient states he has great difficulty with walking, and lower body dressing.  Patient states he is unable to don pants independently, and is unable to reach his socks.  Patient reports he has good support from his spouse when he gets home, but needs to be more mobile before returning home.     ROS  Pertinent positives are mentioned in the HPI, all others reviewed and are negative.     Social Hx:  1 SH  2 LUCERO  With: Spouse     THERAPY:  Restrictions: Posterior hip precautions  PT:  Functional mobility   5/17: Mod assist sit to stand and transfers     OT: ADLs  Pending     SLP:   None     IMAGING:  XR hip 5/15/2023  Acute comminuted and displaced fracture of the left femoral neck.     PROCEDURES:  Denver Carrillo MD 5/16/2023  Left hip hemiarthroplasty     PMH:    Past Medical History  Past Medical History:  Diagnosis Date   Arthritis      hands, knees, shoulders   Backpain     Cancer (HCC)      prostectomy, R kidney removal, skin   Diabetes      borderline   Fall      3 days ago   Hypertension     Prostate cancer (HCC)     Renal cancer (HCC)     Skin cancer            PSH:    Past Surgical History  Past Surgical History:  Procedure Laterality Date   PB PARTIAL HIP REPLACEMENT   5/16/2023    Procedure: HEMIARTHROPLASTY, HIP;  Surgeon: Denver Carrillo M.D.;  Location: SURGERY [facility];  Service: Orthopedics   GASTROSCOPY WITH BIOPSY   1/23/2012    Performed by [facility]DR[facility] at [facility]   EGD W/ENDOSCOPIC ULTRASOUND   1/23/2012    Performed by OBDULIO ALEJANDRA at [facility] ORS   ERCP W/SPHINCTEROTOMY/PAPILL.   1/23/2012    Performed by OBDULIO ALEJANDRA at [facility] ORS   ERCP W/REMOVAL CALCULUS   1/23/2012    Performed by OBDULIO ALEJANDRA at [facility] ORS   PROSTATECTOMY, RADIAL   2005   OTHER   1984    Right kidney removed   OTHER ORTHOPEDIC SURGERY   1974    R knee surgery, repair   APPENDECTOMY   1966          FHX:    Family History  Family History  Problem Relation Age of Onset   Cancer Unknown     Diabetes Unknown     Hypertension Unknown     Heart Disease Unknown     Stroke Unknown            Medications:    Current Facility-Administered Medications  Medication Dose   lactated ringers infusion (BOLUS)  1,000 mL   cefTRIAXone (Rocephin) syringe 2,000 mg  2,000 mg   Pharmacy Consult Request ...Pain Management Review 1 Each  1 Each   oxyCODONE immediate-release (ROXICODONE) tablet 2.5 mg  2.5 mg    Or   oxyCODONE immediate-release  "(ROXICODONE) tablet 5 mg  5 mg    Or   HYDROmorphone (Dilaudid) injection 0.25 mg  0.25 mg   enoxaparin (Lovenox) inj 40 mg  40 mg   famotidine (PEPCID) tablet 20 mg  20 mg   calcium carbonate (Tums) chewable tab 500 mg  500 mg   metoprolol tartrate (LOPRESSOR) tablet 25 mg  25 mg   hydroCHLOROthiazide (HYDRODIURIL) tablet 12.5 mg  12.5 mg   senna-docusate (PERICOLACE or SENOKOT S) 8.6-50 MG per tablet 2 Tablet  2 Tablet    And   polyethylene glycol/lytes (MIRALAX) PACKET 1 Packet  1 Packet    And   magnesium hydroxide (MILK OF MAGNESIA) suspension 30 mL  30 mL    And   bisacodyl (DULCOLAX) suppository 10 mg  10 mg   ondansetron (ZOFRAN) syringe/vial injection 4 mg  4 mg   ondansetron (ZOFRAN ODT) dispertab 4 mg  4 mg   hydrALAZINE (APRESOLINE) injection 20 mg  20 mg   allopurinol (ZYLOPRIM) tablet 150 mg  150 mg   pregabalin (LYRICA) capsule 150 mg  150 mg   acetaminophen (TYLENOL) tablet 1,000 mg  1,000 mg    Followed by   [START ON 5/21/2023] acetaminophen (TYLENOL) tablet 1,000 mg  1,000 mg   gabapentin (NEURONTIN) capsule 100 mg  100 mg        Allergies:    Allergies  Allergen Reactions   Crestor [Rosuvastatin Calcium]     Levaquin     Metformin     Naproxen     Pcn [Penicillins]     Pravastatin     Simvastatin     Statins [Hmg-Coa-R Inhibitors]             Physical Exam:  Vitals: /66   Pulse 69   Temp 36.3 °C (97.3 °F) (Temporal)   Resp 18   Ht 1.905 m (6' 3\")   Wt 107 kg (235 lb 7.2 oz)   SpO2 92%   Gen: NAD  Head: NC/AT  Eyes/ Nose/ Mouth: moist mucous membranes  Cardio: RRR, good distal perfusion, warm extremities  Pulm: normal respiratory effort, no cyanosis   Abd: Soft NTND, negative borborygmi   Ext: No peripheral edema. No calf tenderness. No clubbing.  Surgical dressing in place in the left lateral proximal thigh     Mental status:  A&Ox4 (person, place, date, situation) answers questions appropriately follows commands  Speech: fluent, no aphasia or dysarthria     Motor:                     "          Upper Extremity  Myotome R L  Shoulder flexion C5 5 5  Elbow flexion C5 5 5  Wrist extension C6 5 5  Elbow extension C7 5 5  Finger flexion C8 5 5  Finger abduction T1 5 5       Lower Extremity Myotome R L  Hip flexion L2 5 2/5  Knee extension L3 5 4/5  Ankle dorsiflexion L4 5 5  Toe extension L5 5 5  Ankle plantarflexion S1 5 5     Sensory:   intact to light touch through out     Labs: Reviewed and significant for     Recent Labs    05/16/23 0257 05/16/23 2356 05/17/23 0751 05/18/23  0243  RBC 5.27 4.74 4.41* 4.43*  HEMOGLOBIN 15.9 14.3 13.9* 13.7*  HEMATOCRIT 48.7 43.4 41.0* 41.1*  PLATELETCT 182 162* 147* 146*  PROTHROMBTM 14.1  --   --   --   INR 1.10  --   --   --        Recent Labs    05/16/23 2356 05/17/23 0751 05/18/23  0243  SODIUM 133* 136 139  POTASSIUM 4.2 4.1 3.7  CHLORIDE 98 101 100  CO2 25 25 26  GLUCOSE 145* 151* 112*  BUN 19 21 22  CREATININE 1.13 1.17 1.10  CALCIUM 8.7 8.5 8.5       Recent Results  Recent Results (from the past 24 hour(s))  CBC WITH DIFFERENTIAL    Collection Time: 05/18/23  2:43 AM  Result Value Ref Range    WBC 10.7 4.8 - 10.8 K/uL    RBC 4.43 (L) 4.70 - 6.10 M/uL    Hemoglobin 13.7 (L) 14.0 - 18.0 g/dL    Hematocrit 41.1 (L) 42.0 - 52.0 %    MCV 92.8 81.4 - 97.8 fL    MCH 30.9 27.0 - 33.0 pg    MCHC 33.3 (L) 33.7 - 35.3 g/dL    RDW 48.0 35.9 - 50.0 fL    Platelet Count 146 (L) 164 - 446 K/uL    MPV 10.0 9.0 - 12.9 fL    Neutrophils-Polys 71.70 44.00 - 72.00 %    Lymphocytes 13.70 (L) 22.00 - 41.00 %    Monocytes 11.90 0.00 - 13.40 %    Eosinophils 1.60 0.00 - 6.90 %    Basophils 0.60 0.00 - 1.80 %    Immature Granulocytes 0.50 0.00 - 0.90 %    Nucleated RBC 0.00 /100 WBC    Neutrophils (Absolute) 7.69 (H) 1.82 - 7.42 K/uL    Lymphs (Absolute) 1.47 1.00 - 4.80 K/uL    Monos (Absolute) 1.28 (H) 0.00 - 0.85 K/uL    Eos (Absolute) 0.17 0.00 - 0.51 K/uL    Baso (Absolute) 0.06 0.00 - 0.12 K/uL    Immature Granulocytes (abs) 0.05 0.00 - 0.11 K/uL    NRBC  (Absolute) 0.00 K/uL  Comp Metabolic Panel    Collection Time: 05/18/23  2:43 AM  Result Value Ref Range    Sodium 139 135 - 145 mmol/L    Potassium 3.7 3.6 - 5.5 mmol/L    Chloride 100 96 - 112 mmol/L    Co2 26 20 - 33 mmol/L    Anion Gap 13.0 7.0 - 16.0    Glucose 112 (H) 65 - 99 mg/dL    Bun 22 8 - 22 mg/dL    Creatinine 1.10 0.50 - 1.40 mg/dL    Calcium 8.5 8.5 - 10.5 mg/dL    AST(SGOT) 30 12 - 45 U/L    ALT(SGPT) 19 2 - 50 U/L    Alkaline Phosphatase 63 30 - 99 U/L    Total Bilirubin 1.9 (H) 0.1 - 1.5 mg/dL    Albumin 3.3 3.2 - 4.9 g/dL    Total Protein 5.5 (L) 6.0 - 8.2 g/dL    Globulin 2.2 1.9 - 3.5 g/dL    A-G Ratio 1.5 g/dL  PROCALCITONIN    Collection Time: 05/18/23  2:43 AM  Result Value Ref Range    Procalcitonin 0.41 (H) <0.25 ng/mL  CORRECTED CALCIUM    Collection Time: 05/18/23  2:43 AM  Result Value Ref Range    Correct Calcium 9.1 8.5 - 10.5 mg/dL  ESTIMATED GFR    Collection Time: 05/18/23  2:43 AM  Result Value Ref Range    GFR (CKD-EPI) 70 >60 mL/min/1.73 m 2             ASSESSMENT:  Patient is a 72 y.o. male admitted with left hip fracture now s/p left hip hemiarthroplasty     [facility] Code / Diagnosis to Support: 0008.51 - Orthopaedic Disorders: Status Post Unilateral Hip Replacement     Rehabilitation: Impaired ADLs and mobility  Patient is a good candidate for inpatient rehab based on needs for PT, OT.  Patient will also benefit from family training.  Patient has a good discharge situation which will be home with spouse.      Barriers to transfer include: Insurance authorization, TCCs to verify disposition, medical clearance and bed availability      All cases are subject to administrative review and recommendations may change     Disposition recommendations:  -Good candidate for IPR.  Patient has deficits with mobility and ADLs secondary to his left hip fracture.  Patient has good family support from his spouse, and a stable discharge environment which is a single-story home with 2 stairs to  enter.  -Awaiting OT evaluation, however per my exam patient has needs with occupational therapy that will qualify him for IPR  -TCC to submit to [employer] insurance for prior authorization  -TCC to verify discharge support through spouse  -PMR to follow in the periphery for rehab appropriateness, please reach out with questions or request for medical management     Medical Complexity:     Left hip fracture after ground-level fall and syncopal episode  -Status post left hip hemiarthroplasty by Dr. Denver Carrillo MD on 5/16/2023  -WBAT with posterior hip precautions for LLE  -Fractures considered pathologic secondary to osteoporosis  -Postop pain control per primary team  -Continue PT OT while in-house  -Plan for IPR     Syncope  -Likely secondary to atrial fibrillation with RVR  -Rate control with metoprolol     Leukocytosis  -Unclear etiology  -Ceftriaxone 2 g daily IV  -WBC normalizing  -Close observation and monitoring with serial labs     Hypertension  -HCTZ 12.5 mg every morning  -Hydralazine as needed  -Metoprolol 25 mg twice daily     DVT PPX: Lovenox        Thank you for allowing us to participate in the care of this patient.      Patient was seen for >80 minutes on unit/floor of which > 50% of time was spent on counseling and coordination of care regarding the above, including prognosis, risk reduction, benefits of treatment, and options for next stage of care.     Chris Nolasco, DO   Physical Medicine and Rehabilitation      Please note that this dictation was created using voice recognition software. I have made every reasonable attempt to correct obvious errors, but there may be errors of grammar and possibly content that I did not discover before finalizing the note.        Co-morbidities:  as listed above and below   Potential Risk - Complications: Contractures, Deep Vein Thrombosis, Pain, Perceptual Impairment, Pneumonia, and Pressure Ulcer  Level of Risk: High    Ongoing Medical Management Needed  "(Medical/Nursing Needs):   Patient Active Problem List    Diagnosis Date Noted    Class 1 obesity in adult 05/17/2023    Thrombocytopenia (HCC) 05/17/2023    First degree heart block 05/16/2023    Syncope and collapse 05/15/2023    Pathological fracture of left femur due to osteoporosis (HCC) 05/15/2023    Leukocytosis 05/15/2023    Neuropathy 05/15/2023    Near syncope 09/16/2011    Bradycardia 09/16/2011    Diabetes 09/16/2011    Hypertension 09/16/2011       Current Vital Signs:   Temperature: 36.3 °C (97.3 °F) Pulse: 69 Respiration: 18 Blood Pressure : 135/66  Weight: 107 kg (235 lb 7.2 oz) Height: 190.5 cm (6' 3\")  Pulse Oximetry: 92 % O2 (LPM): 1      Completed Laboratory Reports:  Recent Labs     05/15/23  1712 05/16/23  0257 05/16/23  2356 05/17/23  0751 05/18/23  0243   WBC 11.6* 10.5 14.7* 13.2* 10.7   HEMOGLOBIN 16.5 15.9 14.3 13.9* 13.7*   HEMATOCRIT 49.8 48.7 43.4 41.0* 41.1*   PLATELETCT 175 182 162* 147* 146*   SODIUM 139 139 133* 136 139   POTASSIUM 4.0 4.9 4.2 4.1 3.7   BUN 20 18 19 21 22   CREATININE 0.95 0.89 1.13 1.17 1.10   ALBUMIN 4.3 3.9 3.5 3.4 3.3   GLUCOSE 137* 122* 145* 151* 112*   INR  --  1.10  --   --   --      Additional Labs: Not Applicable    Prior Living Situation:   Housing / Facility: 1 Story House (0 stairs)  Steps Into Home: 2  Steps In Home: 0  Lives with - Patient's Self Care Capacity: Spouse  Equipment Owned: Front-Wheel Walker, Single Point Cane    Prior Level of Function / Living Situation:   Physical Therapy: Prior Services: None, Home-Independent  Housing / Facility: 1 Story House (0 stairs)  Steps Into Home: 2  Steps In Home: 0  Equipment Owned: Front-Wheel Walker, Single Point Cane  Lives with - Patient's Self Care Capacity: Spouse  Bed Mobility: Independent  Transfer Status: Independent  Ambulation: Independent  Assistive Devices Used: None  Stairs: Independent  Current Level of Function:   Gait Level Of Assist: Unable to Participate  Supine to Sit: Moderate " Assist  Sit to Stand: Moderate Assist  Bed, Chair, Wheelchair Transfer: Moderate Assist     Occupational Therapy:      Prior Services: None, Home-Independent  Housing / Facility: 1 Story House (0 stairs)  Current Level of Function:      Speech Language Pathology:      Rehabilitation Prognosis/Potential: Good  Estimated Length of Stay: 10-14 days    Nursing:      Continent    Scope/Intensity of Services Recommended:  Physical Therapy: 1.5 hr / day  5 days / week. Therapeutic Interventions Required: Maximize Endurance, Mobility, Strength, and Safety  Occupational Therapy: 1.5 hr / day 5 days / week. Therapeutic Interventions Required: Maximize Self Care, ADLs, IADLs, and Energy Conservation  Rehabilitation Nursin/7. Therapeutic Interventions Required: Monitor Pain, Skin, Wound(s), Vital Signs, Intake and Output, Safety, and Family Training  Rehabilitation Physician: 3 - 5 days / week. Therapeutic Interventions Required: Medical Management  Respiratory Care: evaluate . Therapeutic Interventions Required: Pulmonary Toileting and O2 Weaning  Dietician: consult . Therapeutic Interventions Required: nutritional consult     He requires 24-hour rehabilitation nursing to manage bowel and bladder function, skin care, surgical incision, nutrition and fluid intake, pulmonary hygiene, pain control, safety, medication management, and patient/family goals. In addition, rehabilitation nursing will reiterate and reinforce therapy skills and equipment use, including ADLs, as well as provide education to the patient and family. Andreas Ortega is willing to participate in and is able to tolerate the proposed plan of care.    Rehabilitation Goals and Plan (Expected frequency & duration of treatment in the IRF):   Return to the Community, Supervised Level of Care, Modified Independent Level of Care, Outpatient Support, and Family Able to Provide 24/7 Assistance  Anticipated Date of Rehabilitation Admission: 2023  Patient/Family  oriented IRF level of care/facility/plan: Yes  Patient/Family willing to participate in IRF care/facility/plan: Yes  Patient able to tolerate IRF level of care proposed: Yes  Patient has potential to benefit IRF level of care proposed: Yes  Comments: Not Applicable    Special Needs or Precautions - Medical Necessity:  Safety Concerns/Precautions:  Fall Risk / High Risk for Falls and Balance  Complex Wound Care: post surgical   Pain Management  Requires Oxygen  Total Hip Precaution: Posterior  Current Medications:    Current Facility-Administered Medications Ordered in Epic   Medication Dose Route Frequency Provider Last Rate Last Admin    lactated ringers infusion (BOLUS)  1,000 mL Intravenous Once PRN Maranda Willams, A.P.R.N.        cefTRIAXone (Rocephin) syringe 2,000 mg  2,000 mg Intravenous DAILY Maranda Willams, A.P.R.N.   2,000 mg at 05/18/23 0449    Pharmacy Consult Request ...Pain Management Review 1 Each  1 Each Other PHARMACY TO DOSE IVETTE Anton.O.        oxyCODONE immediate-release (ROXICODONE) tablet 2.5 mg  2.5 mg Oral Q3HRS PRN IVETTE Anton.O.        Or    oxyCODONE immediate-release (ROXICODONE) tablet 5 mg  5 mg Oral Q3HRS PRN ELI AntonO.        Or    HYDROmorphone (Dilaudid) injection 0.25 mg  0.25 mg Intravenous Q3HRS PRN ELI AntonO.        enoxaparin (Lovenox) inj 40 mg  40 mg Subcutaneous DAILY AT 1800 ELI AntonO.   40 mg at 05/17/23 1732    famotidine (PEPCID) tablet 20 mg  20 mg Oral BID PRN Jasper Beltran M.D.        calcium carbonate (Tums) chewable tab 500 mg  500 mg Oral Q6HRS PRN Jasper Beltarn M.D.   500 mg at 05/18/23 1153    metoprolol tartrate (LOPRESSOR) tablet 25 mg  25 mg Oral TWICE DAILY ELI AntonO.   25 mg at 05/17/23 1733    hydroCHLOROthiazide (HYDRODIURIL) tablet 12.5 mg  12.5 mg Oral QAM ELI AntonO.   12.5 mg at 05/18/23 0449    senna-docusate (PERICOLACE or SENOKOT S) 8.6-50 MG per tablet 2 Tablet  2  Tablet Oral BID Jasper Beltran M.D.   2 Tablet at 05/17/23 0420    And    polyethylene glycol/lytes (MIRALAX) PACKET 1 Packet  1 Packet Oral QDAY PRN Jasper Beltran M.D.        And    magnesium hydroxide (MILK OF MAGNESIA) suspension 30 mL  30 mL Oral QDAY PRN Jasper Beltran M.D.        And    bisacodyl (DULCOLAX) suppository 10 mg  10 mg Rectal QDAY PRN Jasper Beltran M.D.        ondansetron (ZOFRAN) syringe/vial injection 4 mg  4 mg Intravenous Q4HRS PRN Jasper Beltran M.D.   4 mg at 05/15/23 1919    ondansetron (ZOFRAN ODT) dispertab 4 mg  4 mg Oral Q4HRS PRN Jasper Beltran M.D.        hydrALAZINE (APRESOLINE) injection 20 mg  20 mg Intravenous Q6HRS PRN Jasper Beltran M.D.        allopurinol (ZYLOPRIM) tablet 150 mg  150 mg Oral DAILY Jasper Beltran M.D.   150 mg at 05/18/23 0449    pregabalin (LYRICA) capsule 150 mg  150 mg Oral BID Jasper Beltran M.D.   150 mg at 05/18/23 0449    acetaminophen (TYLENOL) tablet 1,000 mg  1,000 mg Oral Q6HRS Jasper Beltran M.D.   1,000 mg at 05/18/23 1153    Followed by    [START ON 5/21/2023] acetaminophen (TYLENOL) tablet 1,000 mg  1,000 mg Oral Q6HRS PRN Jasper Beltran M.D.        gabapentin (NEURONTIN) capsule 100 mg  100 mg Oral BID PRN Jasper Beltran M.D.         No current [facility]-ordered outpatient medications on file.     Diet:   DIET ORDERS (From admission to next 24h)       Start     Ordered    05/16/23 1229  Diet Order Diet: Regular  ALL MEALS        Question:  Diet:  Answer:  Regular    05/16/23 1229                    Anticipated Discharge Destination / Patient/Family Goal:  Destination: Home with Assistance Support System: Spouse  Anticipated home health services: OT and PT  Previously used HH service/ provider: Not Applicable  Anticipated DME Needs: Walker  Outpatient Services: PT  Alternative resources to address additional identified needs:   Future Appointments   Date Time Provider Department Center    6/6/2023  2:00 PM Gisele Cannon M.D. [facility] None     Kevin Haines  Clinical Admissions Coordinator  Discharge Planning      Signed  Date of Service:  5/17/2023 11:13 AM        PM&R referral from Dr. Fitzgerald. GLF resulting in a displaced left femoral neck fracture. Required left hip hemiarthroplasty DOS 05/16/2023. WBAT posterior precautions. Spoke with Andreas and Ileana about goals and expectation for IRF level of care. Discussed therapy plan for 3 hours per day 5 days a week. Discussed therapy schedules 30/45 or 60 minute sessions typically 1.5 hours between breakfast and lunch and another 1.5 hours between lunch and dinner. Discussed PT/OT and SLP roles. Discussed potential length of stay. Discussed insurance [facility] coverage for the program. Discussed Covid visitation and clothing requirements. Ileana will be primary support for Andreas to return to the community. Discussed participation with therapy program when visiting.  Family goal for MOD for Mobility and self care.  Home is a single story with 2-3 step to enter.  Discussed discharge planner role and team conference. DME in the home includes none  In the event of additional support need outpatient in [place]. Ileana has Lupus and is limited by fatigue and can provide limited physical support. Ileana does drive and is able to commit to family training with a day or to prior notice. Ileana voiced concern about narcotic sensitivity. Both Ileana and Andreas are agreeable to transfer to [facility] for post acute services if approved. Physiatry to consult per protocol.               Pre-Screen Completed: 5/18/2023 11:44 AM Kevin Haines

## 2023-05-18 NOTE — PROGRESS NOTES
Contacted APRJERRI Perez as pt has had multiple bouts of confuseion since surgery 5/16. Per day shift RN, pt had an episode of confusion at about 1700. By time MD came to assess pt, he was back to baseline. Day shift RN contacted pt's wife who stated he had similar situations happen after knee surgery. Per APRN, she will place head CT orders. Pt OK to be off tele for CT.

## 2023-05-18 NOTE — PROGRESS NOTES
"      Orthopaedic Progress Note    Interval changes:  Patient doing well   Left hip dressings are CDI  Cleared for DC to rehab by ortho pending medicine clearance    ROS - Patient denies any new issues.  Pain well controlled.    /66   Pulse 69   Temp 36.3 °C (97.3 °F) (Temporal)   Resp 18   Ht 1.905 m (6' 3\")   Wt 107 kg (235 lb 7.2 oz)   SpO2 92%     Patient seen and examined  No acute distress  Breathing non labored  RRR  Left hip surgical dressing is clean, dry, and intact. Patient clearly fires tibialis anterior, EHL, and gastrocnemius/soleus. Sensation is intact to light touch throughout superficial peroneal, deep peroneal, tibial, saphenous, and sural nerve distributions. Strong and palpable 2+ dorsalis pedis and posterior tibial pulses with capillary refill less than 2 seconds. No lower leg tenderness or discomfort.     Recent Labs     05/16/23  2356 05/17/23  0751 05/18/23  0243   WBC 14.7* 13.2* 10.7   RBC 4.74 4.41* 4.43*   HEMOGLOBIN 14.3 13.9* 13.7*   HEMATOCRIT 43.4 41.0* 41.1*   MCV 91.6 93.0 92.8   MCH 30.2 31.5 30.9   MCHC 32.9* 33.9 33.3*   RDW 46.1 47.7 48.0   PLATELETCT 162* 147* 146*   MPV 9.6 9.9 10.0       Active Hospital Problems    Diagnosis     Class 1 obesity in adult [E66.9]     Thrombocytopenia (HCC) [D69.6]     First degree heart block [I44.0]     Syncope and collapse [R55]     Pathological fracture of left femur due to osteoporosis (HCC) [M80.052A]     Leukocytosis [D72.829]     Neuropathy [G62.9]     Hypertension [I10]        Assessment/Plan:  Patient doing well    Left hip dressings are CDI  Cleared for DC to rehab by ortho pending medicine clearance  POD#2 S/P  Left hip hemiarthroplasty (cemented)  Wt bearing status - WBAT with posterior hip precautions  Wound care/Drains - Dressings to be changed every other day by nursing. Or PRN for saturation starting POD#2  Future Procedures - none planned   Lovenox: Start 5/17, Duration-until ambulatory > 150'  Sutures/Staples out- " 14-21 days post operatively. Removal will completed by ortho mid levels only.  PT/OT-initiated  Antibiotics: rocephin 2g IV QD  DVT Prophylaxis- TEDS/SCDs/Foot pumps  Gallardo-not needed per ortho  Case Coordination for Discharge Planning - Disposition per therapy recs.

## 2023-05-18 NOTE — PROGRESS NOTES
RN received bedside report, pt care assumed. Pt resting comfortably. No current concerns. Bed alarm on, call light within reach. VSS. Pt has no complaints of pain at this time. Hourly rounding in place. Telemetry box in place. Breathing equal and unlabored. Pt on room air.

## 2023-05-18 NOTE — PROGRESS NOTES
Hospital Medicine Daily Progress Note    Date of Service  5/18/2023    Chief Complaint  Andreas Ortega is a 72 y.o. male admitted 5/15/2023 with syncope and hip fracture    Hospital Course  Andreas is a 72M presenting with severe acute L hip pain after syncopal episode; pmhx includes atrial fibrillation (no AC/meds, [facility]), HTN, DM2, prostate CA (s/p prostatectomy), R renal CA (s/p R nephrectomy). Patient presents with episode of lightheadedness resulting in fall. May have been precipitated by irregular heart rhythm. Patient found to have left femur fracture which was repaired by orthopedic surgery.     Interval Problem Update  Patient was seen and examined at bedside.  No acute events overnight. Patient is resting comfortably in bed and in no acute distress.     Pain control    I have discussed this patient's plan of care and discharge plan at IDT rounds today with Case Management, Nursing, Nursing leadership, and other members of the IDT team.    Consultants/Specialty  orthopedics    Code Status  Full Code    Disposition  The patient is medically cleared for discharge to home or a post-acute facility.  Anticipate discharge to: an inpatient rehabilitation hospital    I have placed the appropriate orders for post-discharge needs.    Review of Systems  Review of Systems   Constitutional:  Negative for chills and fever.   HENT:  Negative for congestion and sore throat.    Eyes:  Negative for blurred vision and double vision.   Respiratory:  Negative for cough and shortness of breath.    Cardiovascular:  Negative for chest pain and palpitations.   Gastrointestinal:  Negative for abdominal pain, diarrhea, nausea and vomiting.   Genitourinary:  Negative for dysuria and frequency.   Musculoskeletal:  Positive for falls and joint pain.   Skin:  Negative for rash.   Neurological:  Negative for seizures, loss of consciousness and headaches.   Psychiatric/Behavioral:  Negative for hallucinations and substance abuse.          Physical Exam  Temp:  [36.3 °C (97.3 °F)-36.9 °C (98.4 °F)] 36.9 °C (98.4 °F)  Pulse:  [] 106  Resp:  [18] 18  BP: (110-136)/(56-69) 120/69  SpO2:  [90 %-94 %] 94 %    Physical Exam  Vitals and nursing note reviewed.   Constitutional:       General: He is not in acute distress.     Appearance: He is not toxic-appearing.   HENT:      Head: Normocephalic.      Right Ear: External ear normal.      Left Ear: External ear normal.      Nose: No congestion.      Mouth/Throat:      Mouth: Mucous membranes are moist.      Pharynx: No oropharyngeal exudate.   Eyes:      General: No scleral icterus.     Pupils: Pupils are equal, round, and reactive to light.   Cardiovascular:      Rate and Rhythm: Normal rate and regular rhythm.      Heart sounds: No murmur heard.  Pulmonary:      Breath sounds: No wheezing.   Abdominal:      Palpations: Abdomen is soft.      Tenderness: There is no abdominal tenderness. There is no guarding or rebound.   Musculoskeletal:         General: No swelling or deformity.      Comments: Post surgical changes to left hip  Left lower extremity neurovascularly in tact   Skin:     Capillary Refill: Capillary refill takes less than 2 seconds.      Coloration: Skin is not jaundiced.      Findings: No bruising.   Neurological:      General: No focal deficit present.      Mental Status: He is alert.      Motor: No weakness.   Psychiatric:         Mood and Affect: Mood normal.         Behavior: Behavior normal.         Fluids    Intake/Output Summary (Last 24 hours) at 5/18/2023 1602  Last data filed at 5/18/2023 1100  Gross per 24 hour   Intake 800 ml   Output 1800 ml   Net -1000 ml       Laboratory  Recent Labs     05/16/23  2356 05/17/23  0751 05/18/23  0243   WBC 14.7* 13.2* 10.7   RBC 4.74 4.41* 4.43*   HEMOGLOBIN 14.3 13.9* 13.7*   HEMATOCRIT 43.4 41.0* 41.1*   MCV 91.6 93.0 92.8   MCH 30.2 31.5 30.9   MCHC 32.9* 33.9 33.3*   RDW 46.1 47.7 48.0   PLATELETCT 162* 147* 146*   MPV 9.6 9.9 10.0      Recent Labs     05/16/23  2356 05/17/23  0751 05/18/23  0243   SODIUM 133* 136 139   POTASSIUM 4.2 4.1 3.7   CHLORIDE 98 101 100   CO2 25 25 26   GLUCOSE 145* 151* 112*   BUN 19 21 22   CREATININE 1.13 1.17 1.10   CALCIUM 8.7 8.5 8.5     Recent Labs     05/16/23  0257   INR 1.10               Imaging  CT-HEAD W/O   Final Result         1.  No acute intracranial abnormality is identified, there are nonspecific white matter changes, commonly associated with small vessel ischemic disease.  Associated mild cerebral atrophy is noted.   2.  Atherosclerosis.         DX-CHEST-PORTABLE (1 VIEW)   Final Result      No acute cardiac or pulmonary abnormalities are identified.      EC-ECHOCARDIOGRAM COMPLETE W/ CONT   Final Result      DX-CHEST-PORTABLE (1 VIEW)   Final Result      1.  Low lung volumes.   2.  Hypoventilatory changes versus interstitial infiltrates and/or edema.   3.  Cardiomegaly.      DX-HIP-UNILATERAL-WITH PELVIS-1 VIEW LEFT   Final Result         Acute comminuted and displaced fracture of the left femoral neck.           Assessment/Plan  * Pathological fracture of left femur due to osteoporosis (HCC)  Assessment & Plan  L hip fracture, acute.  As demonstrated on imaging  S/p left hip hemiarthroplasty 05/16  Pain control  Physiatry consult for IPR candidacy       Acute blood loss anemia  Assessment & Plan  Hb 16.5 --> 13.7  In setting of long bone fracture  Stable  Incision site looks good    First degree heart block  Assessment & Plan  Severe first degree heart block.  -telemetry monitoring  -consideration of cardiology consultation if more severe heart block observed during admission    Syncope and collapse- (present on admission)  Assessment & Plan  Patient fell due to lightheadedness - does not appear to have LOC  Patient reports history of afib although not on any rate control, [facility]  EKG demonstrates ectopic atrial rhythy, RBBB, 1st degree AV block  -telemetry  Echo reviewed  Cortisol is not  necessarily consistent with AI      Thrombocytopenia (HCC)  Assessment & Plan  Plt 147  monitor    Class 1 obesity in adult  Assessment & Plan  Due to excess caloric intake    Neuropathy  Assessment & Plan  Chronic neuropathy.  -Gabapentin 100mg PO BID/PRN    Leukocytosis  Assessment & Plan  Leukocytosis, most likely reactive.  -CBC recheck    Hypertension- (present on admission)  Assessment & Plan  HTN history, elevated on presentation. Antihypertensives held per anticipated operative management.  -Hydralazine IV PRN, SBP goals of <170  Restart HCTZ         VTE prophylaxis: SCDs/TEDs and enoxaparin ppx    I have performed a physical exam and reviewed and updated ROS and Plan today (5/18/2023). In review of yesterday's note (5/17/2023), there are no changes except as documented above.

## 2023-05-19 ENCOUNTER — PHARMACY VISIT (OUTPATIENT)
Dept: PHARMACY | Facility: MEDICAL CENTER | Age: 73
End: 2023-05-19
Payer: COMMERCIAL

## 2023-05-19 VITALS
OXYGEN SATURATION: 91 % | RESPIRATION RATE: 18 BRPM | WEIGHT: 233.69 LBS | HEART RATE: 68 BPM | DIASTOLIC BLOOD PRESSURE: 78 MMHG | BODY MASS INDEX: 29.06 KG/M2 | SYSTOLIC BLOOD PRESSURE: 138 MMHG | HEIGHT: 75 IN | TEMPERATURE: 97.8 F

## 2023-05-19 PROCEDURE — 700102 HCHG RX REV CODE 250 W/ 637 OVERRIDE(OP): Performed by: INTERNAL MEDICINE

## 2023-05-19 PROCEDURE — 97165 OT EVAL LOW COMPLEX 30 MIN: CPT

## 2023-05-19 PROCEDURE — 97530 THERAPEUTIC ACTIVITIES: CPT

## 2023-05-19 PROCEDURE — 700102 HCHG RX REV CODE 250 W/ 637 OVERRIDE(OP): Performed by: STUDENT IN AN ORGANIZED HEALTH CARE EDUCATION/TRAINING PROGRAM

## 2023-05-19 PROCEDURE — 700111 HCHG RX REV CODE 636 W/ 250 OVERRIDE (IP)

## 2023-05-19 PROCEDURE — 97116 GAIT TRAINING THERAPY: CPT

## 2023-05-19 PROCEDURE — 99239 HOSP IP/OBS DSCHRG MGMT >30: CPT | Performed by: INTERNAL MEDICINE

## 2023-05-19 PROCEDURE — A9270 NON-COVERED ITEM OR SERVICE: HCPCS | Performed by: STUDENT IN AN ORGANIZED HEALTH CARE EDUCATION/TRAINING PROGRAM

## 2023-05-19 PROCEDURE — 99024 POSTOP FOLLOW-UP VISIT: CPT | Performed by: STUDENT IN AN ORGANIZED HEALTH CARE EDUCATION/TRAINING PROGRAM

## 2023-05-19 PROCEDURE — A9270 NON-COVERED ITEM OR SERVICE: HCPCS | Performed by: INTERNAL MEDICINE

## 2023-05-19 PROCEDURE — RXMED WILLOW AMBULATORY MEDICATION CHARGE: Performed by: INTERNAL MEDICINE

## 2023-05-19 RX ORDER — GABAPENTIN 100 MG/1
100 CAPSULE ORAL 2 TIMES DAILY
Qty: 90 CAPSULE | Refills: 0 | Status: SHIPPED | OUTPATIENT
Start: 2023-05-19 | End: 2023-07-11

## 2023-05-19 RX ADMIN — SENNOSIDES AND DOCUSATE SODIUM 2 TABLET: 50; 8.6 TABLET ORAL at 05:43

## 2023-05-19 RX ADMIN — HYDROCHLOROTHIAZIDE 12.5 MG: 25 TABLET ORAL at 05:43

## 2023-05-19 RX ADMIN — ALLOPURINOL 150 MG: 300 TABLET ORAL at 05:43

## 2023-05-19 RX ADMIN — ACETAMINOPHEN 1000 MG: 500 TABLET, FILM COATED ORAL at 12:04

## 2023-05-19 RX ADMIN — CEFTRIAXONE SODIUM 2000 MG: 10 INJECTION, POWDER, FOR SOLUTION INTRAVENOUS at 05:43

## 2023-05-19 RX ADMIN — ACETAMINOPHEN 1000 MG: 500 TABLET, FILM COATED ORAL at 05:43

## 2023-05-19 RX ADMIN — METOPROLOL TARTRATE 25 MG: 25 TABLET, FILM COATED ORAL at 05:43

## 2023-05-19 RX ADMIN — PREGABALIN 150 MG: 150 CAPSULE ORAL at 05:43

## 2023-05-19 ASSESSMENT — COGNITIVE AND FUNCTIONAL STATUS - GENERAL
DRESSING REGULAR LOWER BODY CLOTHING: A LITTLE
MOVING TO AND FROM BED TO CHAIR: A LITTLE
MOBILITY SCORE: 22
SUGGESTED CMS G CODE MODIFIER DAILY ACTIVITY: CJ
CLIMB 3 TO 5 STEPS WITH RAILING: A LITTLE
SUGGESTED CMS G CODE MODIFIER MOBILITY: CJ
HELP NEEDED FOR BATHING: A LITTLE
DAILY ACTIVITIY SCORE: 22

## 2023-05-19 ASSESSMENT — GAIT ASSESSMENTS
GAIT LEVEL OF ASSIST: STANDBY ASSIST
DEVIATION: ANTALGIC;STEP TO
DISTANCE (FEET): 150
ASSISTIVE DEVICE: FRONT WHEEL WALKER

## 2023-05-19 ASSESSMENT — ACTIVITIES OF DAILY LIVING (ADL): TOILETING: INDEPENDENT

## 2023-05-19 NOTE — DISCHARGE PLANNING
Case Management Discharge Planning    Admission Date: 5/15/2023  GMLOS: 4  ALOS: 4    6-Clicks ADL Score: 22  6-Clicks Mobility Score: 7  PT and/or OT Eval ordered: Yes  Post-acute Referrals Ordered: Yes  Post-acute Choice Obtained: Yes  Has referral(s) been sent to post-acute provider:  Yes      Anticipated Discharge Dispo: Discharge Disposition: D/T to home under [facility]HA care in anticipation of covered skilled care (06)    DME Needed: No    Action(s) Taken: Choice obtained and Referral(s) sent    Escalations Completed: None    Medically Clear: Yes    Next Steps: Appreciate updates from OT noting pt is safe to return home with HH. Updates to MD and RN. Visited pt at  and he is agreeable to returning home with his wife and HH. Choice made for [facility]. Pt states his wife is coming to  at 1700.    Barriers to Discharge: None    Is the patient up for discharge tomorrow: No- DC today

## 2023-05-19 NOTE — CARE PLAN
The patient is Stable - Low risk of patient condition declining or worsening    Shift Goals  Clinical Goals: rest, no confusion  Patient Goals: sleep, go home  Family Goals: JESUS    Progress made toward(s) clinical / shift goals:    Problem: Pain - Standard  Goal: Alleviation of pain or a reduction in pain to the patient’s comfort goal  Outcome: Progressing     Problem: Knowledge Deficit - Standard  Goal: Patient and family/care givers will demonstrate understanding of plan of care, disease process/condition, diagnostic tests and medications  Outcome: Progressing     Problem: Fall Risk  Goal: Patient will remain free from falls  Outcome: Progressing     Problem: Respiratory  Goal: Patient will achieve/maintain optimum respiratory ventilation and gas exchange  Outcome: Progressing     Problem: Neuro Status  Goal: Neuro status will remain stable or improve  Outcome: Progressing   Pt pain being managed well with scheduled tylenol. Pt remains free of falls. Pt respiratory status is at baseline. Pt's neuro status is at baseline

## 2023-05-19 NOTE — PROGRESS NOTES
PIV and tele box removed. Discharge summary done with patient. RN provided education regarding follow up care, appointments, and medications. RN also provided education regarding when to call doctor and when to call 911.  Meds to beds delivered to patient. Waiting on wife to arrive.

## 2023-05-19 NOTE — DISCHARGE PLANNING
1042  Received Choice form at 1028  Agency/Facility Name: [facility]   Referral sent per Choice form @ [id number]

## 2023-05-19 NOTE — DISCHARGE INSTRUCTIONS
POSTERIOR HIP PRECAUTIONS:   - DO NOT BEND AT HIP MORE THAN 90 DEGREES   - DO NOT CROSS LEGS OR FEET

## 2023-05-19 NOTE — THERAPY
Occupational Therapy   Initial Evaluation     Patient Name: Andreas Ortega  Age:  72 y.o., Sex:  male  Medical Record #: [id number]  Today's Date: 5/19/2023     Precautions  Precautions: Fall Risk, Posterior Hip Precautions, Toe Touch Weight Bearing Left Lower Extremity    Assessment  Patient is 72 y.o. male with a diagnosis of L hemiarthroplasty.  Pt currently limited by decreased activity tolerance, and pain which are affecting pt's ability to complete ADLs/IADLs at baseline. Pt would benefit from OT services in the acute care setting to maximize functional recovery.      Plan    Occupational Therapy Initial Treatment Plan   Treatment Interventions: (P) Self Care / Activities of Daily Living  Treatment Frequency: (P) 3 Times per Week  Duration: (P) 3 Visits       Discharge Recommendations: (P) Recommend home health for continued occupational therapy services        05/19/23 0922   Prior Living Situation   Prior Services None   Housing / Facility 1 Story House   Steps Into Home 2   Steps In Home 0   Equipment Owned Front-Wheel Walker;Single Point Cane   Lives with - Patient's Self Care Capacity Spouse   Prior Level of ADL Function   Self Feeding Independent   Grooming / Hygiene Independent   Bathing Independent   Dressing Independent   Toileting Independent   ADL Assessment   Grooming Supervision   Upper Body Dressing Supervision   Lower Body Dressing Minimal Assist  (wife can assist)   Functional Mobility   Sit to Stand Standby Assist   Bed, Chair, Wheelchair Transfer Standby Assist   Short Term Goals   Short Term Goal # 1 supervised with LB dressing   Education Group   Education Provided Hip Precautions   Hip Precautions Patient Response Patient;Acceptance;Explanation;Demonstration;Verbal Demonstration;Action Demonstration   Occupational Therapy Initial Treatment Plan    Treatment Interventions Self Care / Activities of Daily Living   Treatment Frequency 3 Times per Week   Duration 3 Visits   Anticipated Discharge  Equipment and Recommendations   Discharge Recommendations Recommend home health for continued occupational therapy services   Session Information   Date / Session Number  5/19 1 (1/3 5/25)

## 2023-05-19 NOTE — DISCHARGE SUMMARY
Discharge Summary    CHIEF COMPLAINT ON ADMISSION  Chief Complaint   Patient presents with    Syncope     Pt recently diagnosed w/ afib w/ frequent dizzy spells. Today he had a syncopal event while hosing down his house w/ +LOC but denies hitting his head. -blood thinners    Hip Pain     L hip pain s/t fall with shortening, in position of comfort. CMS intact       Reason for Admission  EMS     Admission Date  5/15/2023    CODE STATUS  Full Code    HPI & HOSPITAL COURSE  Andreas is a 72M presenting with severe acute L hip pain after syncopal episode; pmhx includes atrial fibrillation (no AC/[facility]), HTN, DM2, prostate CA (s/p prostatectomy), R renal CA (s/p R nephrectomy). Patient presents with episode of lightheadedness resulting in fall. May have been precipitated by irregular heart rhythm. Patient found to have left femur fracture which was repaired by orthopedic surgery. Patient placed on lopressor with no arrhythmias noted while inpatient. CT head negative for acute process. 2D echo with EF 65%. Patient improved clinically. PT/OT recommended home with home health which case management assisted in setting up. Patient was determined satisfactory for discharge with appropriate follow up.    Therefore, he is discharged in fair and stable condition to home with organized home healthcare and close outpatient follow-up.    The patient met 2-midnight criteria for an inpatient stay at the time of discharge.    Discharge Date  05/19/2023    FOLLOW UP ITEMS POST DISCHARGE  Please follow up with PCP in 3-5 days for post hospitalization follow up and medication reconciliation.     DISCHARGE DIAGNOSES  Principal Problem:    Pathological fracture of left femur due to osteoporosis (HCC) (POA: Unknown)  Active Problems:    Acute blood loss anemia (POA: Unknown)    Syncope and collapse (POA: Yes)    First degree heart block (POA: Unknown)    Hypertension (POA: Yes)    Leukocytosis (POA: Unknown)    Neuropathy  (POA: Unknown)    Class 1 obesity in adult (POA: Unknown)    Thrombocytopenia (HCC) (POA: Unknown)  Resolved Problems:    * No resolved hospital problems. *      FOLLOW UP  Future Appointments   Date Time Provider Department Center   6/6/2023  2:00 PM Gisele Cannon M.D. [facility] None     Jake Hampton P.A.-C.  [address] [facility]  [address]  [facility] [id number] [phone]    Schedule an appointment as soon as possible for a visit in 2 week(s)  Please schedule a follow up appointment 14-21 days after surgery to get your sutures/staples removed.      MEDICATIONS ON DISCHARGE     Medication List        START taking these medications        Instructions   metoprolol tartrate 25 MG Tabs  Commonly known as: LOPRESSOR   Take 1 Tablet by mouth 2 times a day.  Dose: 25 mg            CONTINUE taking these medications        Instructions   allopurinol 300 MG Tabs  Commonly known as: ZYLOPRIM   Take 150 mg by mouth every day.  Dose: 150 mg     fenofibrate 145 MG Tabs  Commonly known as: TRICOR   Take 145 mg by mouth every day.  Dose: 145 mg     gabapentin 100 MG Caps  Commonly known as: NEURONTIN   Take 1 Capsule by mouth 2 times a day.  Dose: 100 mg     hydroCHLOROthiazide 12.5 MG tablet  Commonly known as: HYDRODIURIL   Take 12.5 mg by mouth every morning.  Dose: 12.5 mg     Magnesium Oxide 420 MG Tabs   Take 420 mg by mouth every day.  Dose: 420 mg     metFORMIN 500 MG Tabs  Commonly known as: GLUCOPHAGE   Take 500 mg by mouth 2 times a day with meals.  Dose: 500 mg     oxyCODONE immediate release 10 MG immediate release tablet  Commonly known as: ROXICODONE   Take 10 mg by mouth every 6 hours as needed (for pain).  Dose: 10 mg     pregabalin 150 MG Caps  Commonly known as: LYRICA   Take 150 mg by mouth 2 times a day.  Dose: 150 mg     vitamin B-12 1000 MCG Tabs   Take 2,000 mcg by mouth every day.  Dose: 2,000 mcg     vitamin D2 (Ergocalciferol) 1.25 MG (94300 UT) Caps capsule  Commonly known as: Drisdol   Take 50,000 Units  by mouth every 7 days.  Dose: 50,000 Units            STOP taking these medications      Ambien 10 MG Tabs  Generic drug: zolpidem     NIFEdipine 30 MG CR tablet  Commonly known as: ADALAT CC     valsartan 80 MG Tabs  Commonly known as: DIOVAN              Allergies  Allergies   Allergen Reactions    Crestor [Rosuvastatin Calcium]     Levaquin     Metformin     Naproxen     Pcn [Penicillins]     Pravastatin     Simvastatin     Statins [Hmg-Coa-R Inhibitors]        DIET  Orders Placed This Encounter   Procedures    Diet Order Diet: Regular     Standing Status:   Standing     Number of Occurrences:   1     Order Specific Question:   Diet:     Answer:   Regular [1]       ACTIVITY  As tolerated.  Weight bearing as tolerated    CONSULTATIONS  Orthopedic surgery    PROCEDURES  05/16 - Left hip hemiarthroplasty (cemented)    LABORATORY  Lab Results   Component Value Date    SODIUM 139 05/18/2023    POTASSIUM 3.7 05/18/2023    CHLORIDE 100 05/18/2023    CO2 26 05/18/2023    GLUCOSE 112 (H) 05/18/2023    BUN 22 05/18/2023    CREATININE 1.10 05/18/2023        Lab Results   Component Value Date    WBC 10.7 05/18/2023    HEMOGLOBIN 13.7 (L) 05/18/2023    HEMATOCRIT 41.1 (L) 05/18/2023    PLATELETCT 146 (L) 05/18/2023        Total time of the discharge process exceeds 38 minutes.

## 2023-05-19 NOTE — PROGRESS NOTES
"      Orthopaedic Progress Note    Interval changes:  Patient doing well   Left hip dressings are CDI  Cleared for DC to home by ortho pending medicine clearance    ROS - Patient denies any new issues.  Pain well controlled.    BP (!) 145/79   Pulse 64   Temp 36.6 °C (97.9 °F) (Temporal)   Resp 18   Ht 1.905 m (6' 3\")   Wt 106 kg (233 lb 11 oz)   SpO2 93%     Patient seen and examined  No acute distress  Breathing non labored  RRR  Left hip surgical dressing is clean, dry, and intact. Patient clearly fires tibialis anterior, EHL, and gastrocnemius/soleus. Sensation is intact to light touch throughout superficial peroneal, deep peroneal, tibial, saphenous, and sural nerve distributions. Strong and palpable 2+ dorsalis pedis and posterior tibial pulses with capillary refill less than 2 seconds. No lower leg tenderness or discomfort.     Recent Labs     05/16/23  2356 05/17/23  0751 05/18/23  0243   WBC 14.7* 13.2* 10.7   RBC 4.74 4.41* 4.43*   HEMOGLOBIN 14.3 13.9* 13.7*   HEMATOCRIT 43.4 41.0* 41.1*   MCV 91.6 93.0 92.8   MCH 30.2 31.5 30.9   MCHC 32.9* 33.9 33.3*   RDW 46.1 47.7 48.0   PLATELETCT 162* 147* 146*   MPV 9.6 9.9 10.0       Active Hospital Problems    Diagnosis     Acute blood loss anemia [D62]     Class 1 obesity in adult [E66.9]     Thrombocytopenia (HCC) [D69.6]     First degree heart block [I44.0]     Syncope and collapse [R55]     Pathological fracture of left femur due to osteoporosis ([facility]) [M80.052A]     Leukocytosis [D72.829]     Neuropathy [G62.9]     Hypertension [I10]        Assessment/Plan:  Patient doing well    Left hip dressings are CDI  Cleared for DC to home by ortho pending medicine clearance  POD#3 S/P  Left hip hemiarthroplasty (cemented)  Wt bearing status - WBAT with posterior hip precautions  Wound care/Drains - Dressings to be changed every other day by nursing. Or PRN for saturation starting POD#2  Future Procedures - none planned   Lovenox: Start 5/17, Duration-until " ambulatory > 150'  Sutures/Staples out- 14-21 days post operatively. Removal will completed by ortho mid levels only.  PT/OT-initiated  Antibiotics: rocephin 2g IV QD  DVT Prophylaxis- TEDS/SCDs/Foot pumps  Gallardo-not needed per ortho  Case Coordination for Discharge Planning - Disposition per therapy recs.

## 2023-05-19 NOTE — THERAPY
"Physical Therapy   Daily Treatment     Patient Name: Andreas Ortega  Age:  72 y.o., Sex:  male  Medical Record #: [id number]  Today's Date: 5/19/2023     Precautions  Precautions: Posterior Hip Precautions;Fall Risk;Weight Bearing As Tolerated Left Lower Extremity    Assessment    Pt is progressing well with therapy and was able to demonstrate with improved overall functional mobility. Pt was able to ambulate a total distance of 150 ft with SBA and go up/down a flight of stairs with use of B railings with CGA. Pt continues to require reminder on posterior hip precautions during bed mobility and sit<>stand transitions. Pt was provided with education regarding walking program upon d/c to home and education on positioning, elevation, along with icing. Pt will continue to benefit from skilled PT while in house. Anticipate pt to d/c home with current functional mobility and recs for FWW use and HH therapy services upon d/c to home.     Plan    Treatment Plan Status: (P) Continue Current Treatment Plan  Type of Treatment: Bed Mobility, Debridement, Equipment, Group Therapy, Gait Training, Manual Therapy, Neuro Re-Education / Balance, Self Care / Home Evaluation, Stair Training, Therapeutic Activities, Therapeutic Exercise  Treatment Frequency: 5 Times per Week  Treatment Duration: Until Therapy Goals Met    DC Equipment Recommendations: (P) Front-Wheel Walker  Discharge Recommendations: (P) Recommend home health for continued physical therapy services    Subjective    \" The pain is very minimal today\"      Objective       05/19/23 1428   Precautions   Precautions Posterior Hip Precautions;Fall Risk;Weight Bearing As Tolerated Left Lower Extremity   Pain 0 - 10 Group   Location Hip   Location Orientation Left   Description Aching   Therapist Pain Assessment Prior to Activity;During Activity;Post Activity;Nurse Notified;1   Cognition    Cognition / Consciousness WDL   Level of Consciousness Alert   Comments pleasant/cooperative; " requires reminder of posterior hip precautions   Sensation Lower Body   Lower Extremity Sensation   WDL   Lower Body Muscle Tone   Lower Body Muscle Tone  WDL   Other Treatments   Other Treatments Provided pt provided with reminder and education on posterior hip precautions, provided with VC and demo in order to maintain precautions during bed mobility and sit<>stands   Neurological Concerns   Neurological Concerns No   Balance   Sitting Balance (Static) Good   Sitting Balance (Dynamic) Fair +   Standing Balance (Static) Fair +   Standing Balance (Dynamic) Fair +   Weight Shift Sitting Good   Weight Shift Standing Fair   Skilled Intervention Verbal Cuing;Postural Facilitation   Comments w/fww   Bed Mobility    Supine to Sit Standby Assist   Sit to Supine Contact Guard Assist   Skilled Intervention Verbal Cuing   Gait Analysis   Gait Level Of Assist Standby Assist   Assistive Device Front Wheel Walker   Distance (Feet) 150   # of Times Distance was Traveled 1   Deviation Antalgic;Step To   # of Stairs Climbed 5   Level of Assist with Stairs Contact Guard Assist   Weight Bearing Status WBAT L LE   Skilled Intervention Tactile Cuing;Facilitation   Functional Mobility   Sit to Stand Standby Assist   Bed, Chair, Wheelchair Transfer Standby Assist   Transfer Method Stand Step   Mobility EOB, sit<>stand, ambulation, stairs, back to bed   Skilled Intervention Verbal Cuing;Tactile Cuing   Comments VC and demo in order to maintain posterior hip precautions during sit<>stand   How much difficulty does the patient currently have...   Turning over in bed (including adjusting bedclothes, sheets and blankets)? 4   Sitting down on and standing up from a chair with arms (e.g., wheelchair, bedside commode, etc.) 4   Moving from lying on back to sitting on the side of the bed? 3   How much help from another person does the patient currently need...   Moving to and from a bed to a chair (including a wheelchair)? 4   Need to walk in a  hospital room? 4   Climbing 3-5 steps with a railing? 3   6 clicks Mobility Score 22   Activity Tolerance   Sitting in Chair NT   Sitting Edge of Bed 5 mins   Standing 10 mins   Comments no adverse events noted   Short Term Goals    Short Term Goal # 1 Pt will perform bed mobility with MIN A   Goal Outcome # 1 Goal met   Short Term Goal # 2 Pt will perform sit<>stand and stand pivot transfers with FWW and MIN A.   Goal Outcome # 2 Goal met, new goal added   Short Term Goal # 2 B  pt will go sit<>stand w/fww w/spv in 6tx   Short Term Goal # 3 Pt will ambulate 20' with FWW and MIN A.   Goal Outcome # 3 Goal met, new goal added   Short Term Goal # 3 B pt will ambulate for 150ft w/fww w/spv in 6txx   Short Term Goal # 4 pt will go up/down 2 steps w/spv in 6tx for safe dc home   Education Group   Hip Precautions Posterior Patient Response Patient;Acceptance;Explanation;Demonstration;Verbal Demonstration   Role of Physical Therapist Patient Response Patient;Acceptance;Explanation;Demonstration;Verbal Demonstration;Action Demonstration   Physical Therapy Treatment Plan   Physical Therapy Treatment Plan Continue Current Treatment Plan   Anticipated Discharge Equipment and Recommendations   DC Equipment Recommendations Front-Wheel Walker   Discharge Recommendations Recommend home health for continued physical therapy services   Interdisciplinary Plan of Care Collaboration   IDT Collaboration with  Nursing   Patient Position at End of Therapy In Bed;Call Light within Reach;Tray Table within Reach;Phone within Reach   Collaboration Comments aware of visit and recs   Session Information   Date / Session Number  5/19-2 (2/5, 5/23)

## 2023-05-19 NOTE — FACE TO FACE
Face to Face Supporting Documentation - Home Health    The encounter with this patient was in whole or in part the primary reason for home health admission.    Date of encounter:   Patient:                    MRN:                       YOB: 2025  Andreas Ortega  [id number]  1950     Home health to see patient for:  Skilled Nursing care for assessment, interventions & education, Physical Therapy evaluation and treatment, and Occupational therapy evaluation and treatment    Skilled need for:  Comment: Hip fracture    Skilled nursing interventions to include:  Comment:      Homebound status evidenced by:  Needs the assistance of another person in order to leave the home. Leaving home requires a considerable and taxing effort. There is a normal inability to leave the home.    Community Physician to provide follow up care: Pcp Pt States None     Optional Interventions? No      I certify the face to face encounter for this home health care referral meets the CMS requirements and the encounter/clinical assessment with the patient was, in whole, or in part, for the medical condition(s) listed above, which is the primary reason for home health care. Based on my clinical findings: the service(s) are medically necessary, support the need for home health care, and the homebound criteria are met.  I certify that this patient has had a face to face encounter by myself.  Joseph Fitzgerald D.O. - NPI: [id number]

## 2023-05-19 NOTE — CARE PLAN
The patient is Stable - Low risk of patient condition declining or worsening    Shift Goals  Clinical Goals: PT/OT eval  Patient Goals: rest  Family Goals: JESUS    Progress made toward(s) clinical / shift goals:    Problem: Pain - Standard  Goal: Alleviation of pain or a reduction in pain to the patient’s comfort goal  Description: Target End Date:  Prior to discharge or change in level of care    Document on Vitals flowsheet    1.  Document pain using the appropriate pain scale per order or unit policy  2.  Educate and implement non-pharmacologic comfort measures (i.e. relaxation, distraction, massage, cold/heat therapy, etc.)  3.  Pain management medications as ordered  4.  Reassess pain after pain med administration per policy  5.  If opiods administered assess patient's response to pain medication is appropriate per POSS sedation scale  6.  Follow pain management plan developed in collaboration with patient and interdisciplinary team (including palliative care or pain specialists if applicable)  Outcome: Progressing     Problem: Knowledge Deficit - Standard  Goal: Patient and family/care givers will demonstrate understanding of plan of care, disease process/condition, diagnostic tests and medications  Description: Target End Date:  1-3 days or as soon as patient condition allows    Document in Patient Education    1.  Patient and family/caregiver oriented to unit, equipment, visitation policy and means for communicating concern  2.  Complete/review Learning Assessment  3.  Assess knowledge level of disease process/condition, treatment plan, diagnostic tests and medications  4.  Explain disease process/condition, treatment plan, diagnostic tests and medications  Outcome: Progressing     Problem: Fall Risk  Goal: Patient will remain free from falls  Description: Target End Date:  Prior to discharge or change in level of care    Document interventions on the Isabela Sanchez Fall Risk Assessment    1.  Assess for fall  risk factors  2.  Implement fall precautions  Outcome: Progressing     Problem: Hemodynamics  Goal: Patient's hemodynamics, fluid balance and neurologic status will be stable or improve  Description: Target End Date:  Prior to discharge or change in level of care    Document on Assessment and I/O flowsheet templates    1.  Monitor vital signs, pulse oximetry and cardiac monitor per provider order and/or policy  2.  Maintain blood pressure per provider order  3.  Hemodynamic monitoring per provider order  4.  Manage IV fluids and IV infusions  5.  Monitor intake and output  6.  Daily weights per unit policy or provider order  7.  Assess peripheral pulses and capillary refill  8.  Assess color and body temperature  9.  Position patient for maximum circulation/cardiac output  10. Monitor for signs/symptoms of excessive bleeding  11. Assess mental status, restlessness and changes in level of consciousness  12. Monitor temperature and report fever or hypothermia to provider immediately. Consideration of targeted temperature management.  Outcome: Progressing     Problem: Fluid Volume  Goal: Fluid volume balance will be maintained  Description: Target End Date:  Prior to discharge or change in level of care    Document on I/O flowsheet    1.  Monitor intake and output as ordered  2.  Promote oral intake as appropriate  3.  Report inadequate intake or output to physician  4.  Administer IV therapy as ordered  5.  Weights per provider order  6.  Assess for signs and symptoms of bleeding  7.  Monitor for signs of fluid overload (respiratory changes, edema, weight gain, increased abdominal girth)  8.  Monitor of signs for inadequate fluid volume (poor skin turgor, dry mucous membranes)  9.  Instruct patient on adherence to fluid restrictions  Outcome: Progressing       Patient is not progressing towards the following goals:

## 2023-05-22 LAB
BACTERIA BLD CULT: NORMAL
SIGNIFICANT IND 70042: NORMAL
SITE SITE: NORMAL
SOURCE SOURCE: NORMAL

## 2023-06-06 ENCOUNTER — OFFICE VISIT (OUTPATIENT)
Dept: CARDIOLOGY | Facility: MEDICAL CENTER | Age: 73
End: 2023-06-06
Attending: INTERNAL MEDICINE
Payer: COMMERCIAL

## 2023-06-06 ENCOUNTER — TELEPHONE (OUTPATIENT)
Dept: CARDIOLOGY | Facility: MEDICAL CENTER | Age: 73
End: 2023-06-06

## 2023-06-06 VITALS
DIASTOLIC BLOOD PRESSURE: 68 MMHG | BODY MASS INDEX: 28.23 KG/M2 | RESPIRATION RATE: 20 BRPM | HEIGHT: 75 IN | HEART RATE: 69 BPM | OXYGEN SATURATION: 95 % | SYSTOLIC BLOOD PRESSURE: 138 MMHG | WEIGHT: 227 LBS

## 2023-06-06 DIAGNOSIS — I45.10 RBBB: ICD-10-CM

## 2023-06-06 DIAGNOSIS — I48.0 PAROXYSMAL ATRIAL FIBRILLATION (HCC): ICD-10-CM

## 2023-06-06 DIAGNOSIS — R55 SYNCOPE AND COLLAPSE: ICD-10-CM

## 2023-06-06 DIAGNOSIS — I44.0 FIRST DEGREE AV BLOCK: ICD-10-CM

## 2023-06-06 LAB — EKG IMPRESSION: NORMAL

## 2023-06-06 PROCEDURE — 93005 ELECTROCARDIOGRAM TRACING: CPT | Performed by: INTERNAL MEDICINE

## 2023-06-06 PROCEDURE — 99212 OFFICE O/P EST SF 10 MIN: CPT | Performed by: INTERNAL MEDICINE

## 2023-06-06 PROCEDURE — 99204 OFFICE O/P NEW MOD 45 MIN: CPT | Performed by: INTERNAL MEDICINE

## 2023-06-06 PROCEDURE — 93010 ELECTROCARDIOGRAM REPORT: CPT | Performed by: INTERNAL MEDICINE

## 2023-06-06 PROCEDURE — 3075F SYST BP GE 130 - 139MM HG: CPT | Performed by: INTERNAL MEDICINE

## 2023-06-06 PROCEDURE — 3078F DIAST BP <80 MM HG: CPT | Performed by: INTERNAL MEDICINE

## 2023-06-06 ASSESSMENT — FIBROSIS 4 INDEX: FIB4 SCORE: 3.49

## 2023-06-06 ASSESSMENT — ENCOUNTER SYMPTOMS
COUGH: 0
FEVER: 0
WHEEZING: 0
DIAPHORESIS: 0
HEMATOCHEZIA: 0
HEMOPTYSIS: 0

## 2023-06-06 NOTE — PROGRESS NOTES
Cardiology Initial Consultation Note    Date of note: 6/6/2023    Primary Care Provider: Pcp Pt States None  Referring Provider: No ref. provider found    Patient Name: Andreas Ortega  YOB: 1950  MRN:              [id number]    Chief Complaint   Patient presents with    Syncope    Atrial Fibrillation     History of Present Illness: Mr. Andreas Ortega is a 72 y.o. male whose current medical problems include history of atrial fibrillation, hypertension, diabetes mellitus, history of prostate cancer status post prior prostatectomy, history of right renal cell cancer status post right nephrectomy, who is here for cardiac consultation for syncope and paroxysmal atrial fibrillation.    The patient was recently admitted from 5/15/2023 to 5/19/2023 and he presented with syncope while hosing down his house and fell.  He suffered a left femur fracture which was repaired by orthopedic surgery.  Patient was placed on Lopressor.  No arrhythmias were noted on telemetry while inpatient.  CT negative.  Echo was normal.    [facility] the past 4 to 5 years.  He feels like the episodes have been increasing and more often.  He feels a very fast heartbeat and will get lightheaded and has to sit down.  He does recall he was on atenolol before but it was taken off and he does not recall why.  I have currently no records available during the time he was physically present for the visit.  It will go up to 140 to 150 beats per minute.  If he has a. Fib when he is standing, he has to sit down. It happens about once a week.  He reports last 15 seconds.  Has been going on for 4-5 years and increasing.     He has worn a heart monitor he reports about [facility] ago [facility].  He does think he has had echocardiogram done.  He is not sure if he has ever had atrial fibrillation documented on twelve-lead but he thinks they must be [facility].  We have requested those  records.    Cardiovascular Risk Factors:  1. Smoking status:   Tobacco Use: Medium Risk (6/6/2023)    Patient History     Smoking Tobacco Use: Former     Smokeless Tobacco Use: Former     Passive Exposure: Not on file     2. Type II Diabetes Mellitus: No results found for: HBA1C  3. Hypertension: Yes on hydrochlorothiazide 12.5 mg p.o. daily, metoprolol 25 mg p.o. twice daily  4. Dyslipidemia: Yes on fenofibrate 145 mg p.o. daily no results found for: CHOLSTRLTOT, LDL, HDL, TRIGLYCERIDE  5. Family history of early Coronary Artery Disease in a first degree relative (Male less than 55 years of age; Female less than 65 years of age): No premature coronary artery disease    Past Medical History:   Diagnosis Date    Arthritis     hands, knees, shoulders    Backpain     Cancer (HCC)     prostectomy, R kidney removal, skin    Diabetes     borderline    Fall     3 days ago    Hypertension     Prostate cancer (HCC)     Renal cancer (HCC)     Skin cancer      Past Surgical History:   Procedure Laterality Date    PB PARTIAL HIP REPLACEMENT  5/16/2023    Procedure: HEMIARTHROPLASTY, HIP;  Surgeon: Denver Carrillo M.D.;  Location: [facility];  Service: Orthopedics    GASTROSCOPY WITH BIOPSY  1/23/2012    Performed by OBDULIO ALEJANDRA at [facility]    EGD W/ENDOSCOPIC ULTRASOUND  1/23/2012    Performed by OBDULIO ALEJANDRA at [facility]    ERCP W/SPHINCTEROTOMY/PAPILL.  1/23/2012    Performed by OBDULIO ALEJANDRA at [facility]    ERCP W/REMOVAL CALCULUS  1/23/2012    Performed by OBDULIO ALEJANDRA at [facility]    PROSTATECTOMY, RADIAL  2005    OTHER  1984    Right kidney removed    OTHER ORTHOPEDIC SURGERY  1974    R knee surgery, repair    APPENDECTOMY  1966       Current Outpatient Medications   Medication Sig Dispense Refill    gabapentin (NEURONTIN) 100 MG Cap Take 1 Capsule by mouth 2 times a day. 90 Capsule 0    metoprolol tartrate (LOPRESSOR) 25 MG Tab Take 1  "Tablet by mouth 2 times a day. 60 Tablet 0    pregabalin (LYRICA) 150 MG Cap Take 150 mg by mouth 2 times a day.      oxyCODONE immediate release (ROXICODONE) 10 MG immediate release tablet Take 10 mg by mouth every 6 hours as needed (for pain).      vitamin D2, Ergocalciferol, (DRISDOL) 1.25 MG (73850 UT) Cap capsule Take 50,000 Units by mouth every 7 days.      fenofibrate (TRICOR) 145 MG Tab Take 145 mg by mouth every day.      allopurinol (ZYLOPRIM) 300 MG Tab Take 150 mg by mouth every day.      Cyanocobalamin (VITAMIN B-12) 1000 MCG Tab Take 2,000 mcg by mouth every day.      Magnesium Oxide 420 MG Tab Take 420 mg by mouth every day.      hydroCHLOROthiazide (HYDRODIURIL) 12.5 MG tablet Take 12.5 mg by mouth every morning.       No current facility-administered medications for this visit.       Allergies   Allergen Reactions    Crestor [Rosuvastatin Calcium]     Levaquin     Metformin     Naproxen     Pcn [Penicillins]     Pravastatin     Simvastatin     Statins [Hmg-Coa-R Inhibitors]        Family History   Problem Relation Age of Onset    Cancer Unknown     Diabetes Unknown     Hypertension Unknown     Heart Disease Unknown     Stroke Unknown        Social History     Socioeconomic History    Marital status:    Tobacco Use    Smoking status: Former     Types: Cigarettes    Smokeless tobacco: Former     Quit date: 5/30/1984    Tobacco comments:     Quit in 1984   Vaping Use    Vaping Use: Never used   Substance and Sexual Activity    Alcohol use: No    Drug use: No      Review of Systems   Constitutional: Negative for diaphoresis and fever.   Respiratory:  Negative for cough, hemoptysis and wheezing.    Gastrointestinal:  Negative for hematochezia and melena.   Genitourinary:  Negative for hematuria.       Physical Exam:  Ambulatory Vitals  /68 (BP Location: Left arm, Patient Position: Sitting, BP Cuff Size: Adult)   Pulse 69   Resp 20   Ht 1.905 m (6' 3\")   Wt 103 kg (227 lb)   SpO2 95%  "   Oxygen Therapy:  Pulse Oximetry: 95 %  BP Readings from Last 4 Encounters:   06/06/23 138/68   05/19/23 138/78   09/17/11 129/81   03/23/10 130/90       Weight/BMI:   Body mass index is 28.37 kg/m².  Wt Readings from Last 2 Encounters:   06/06/23 103 kg (227 lb)   05/18/23 106 kg (233 lb 11 oz)       Physical Exam  Constitutional:       Appearance: Normal appearance.   Eyes:      Extraocular Movements: Extraocular movements intact.      Conjunctiva/sclera: Conjunctivae normal.   Neck:      Vascular: No carotid bruit or JVD.   Cardiovascular:      Rate and Rhythm: Normal rate and regular rhythm.      Pulses:           Radial pulses are 1+ on the right side and 1+ on the left side.      Heart sounds: Normal heart sounds. No murmur heard.     No friction rub. No gallop.   Pulmonary:      Effort: Pulmonary effort is normal. No respiratory distress.      Breath sounds: Normal breath sounds. No wheezing or rales.   Musculoskeletal:      Cervical back: Neck supple.      Right lower leg: No edema.      Left lower leg: No edema.   Skin:     General: Skin is warm and dry.   Neurological:      Mental Status: He is alert and oriented to person, place, and time. Mental status is at baseline.   Psychiatric:         Mood and Affect: Mood normal.       Lab Data Review:  Lab Results   Component Value Date/Time    SODIUM 139 05/18/2023 02:43 AM    POTASSIUM 3.7 05/18/2023 02:43 AM    CHLORIDE 100 05/18/2023 02:43 AM    CO2 26 05/18/2023 02:43 AM    GLUCOSE 112 (H) 05/18/2023 02:43 AM    BUN 22 05/18/2023 02:43 AM    CREATININE 1.10 05/18/2023 02:43 AM     Lab Results   Component Value Date/Time    ALKPHOSPHAT 63 05/18/2023 02:43 AM    ASTSGOT 30 05/18/2023 02:43 AM    ALTSGPT 19 05/18/2023 02:43 AM    TBILIRUBIN 1.9 (H) 05/18/2023 02:43 AM      Lab Results   Component Value Date/Time    WBC 10.7 05/18/2023 02:43 AM     Lab Results   Component Value Date/Time    TSHULTRASEN 1.270 09/15/2011 03:18 PM      Cardiac Imaging and  Procedures Review:    EKG dated 5/15/2023: My personal interpretation is sinus rhythm, 63 bpm, prominent prolonged AL interval of 426 ms, right bundle branch block, left anterior fascicular block, compared to prior ECG that we have on record at 9/15/2011, right bundle branch block and further progression of first-degree AV block is now present    ECG dated 6/6/23: Sinus rhythm, 59 bpm, prominent prolonged AL interval 309, right bundle branch block,, does not quite meet criteria for left anterior fascicular block on present ECG    Echocardiogram dated 5/16/2023:  My personal interpretation is this is a technically difficult study.  Normal left ventricular cavity size, mild concentric hypertrophy, and normal systolic function.  Normal right ventricular systolic function.  Mildly dilated aortic root size measuring 4.4 cm in diameter above the sinotubular junction.  Valves are not well seen, but no significant valvular stenosis or regurgitation.  Trace/trivial pericardial effusion without echo evidence of tamponade.    Nuclear stress test dated 9/16/2011: Report is normal perfusion    Chest x-ray dated 5/16/2023: No acute cardiopulmonary abnormalities    Assessment & Plan     1.  Syncope with injury.  From history, it sounds like he might feel his heart beating really fast for a few seconds, but then he will feel lightheaded and has to sit down.  It sounds like this time he was unable to sit down.  Unfortunately I do not know that this is ever been captured.  Records for event monitor has been requested.  It sounds like he could have tachybradycardia syndrome or he might sick sinus syndrome with very prolonged conversion pauses causing symptoms.  Baseline ECG does show very prominent first-degree AV block and he also has right bundle branch block, on some ECGs he has a left anterior fascicular block.  Based on all this, he is at least class IIa indication for permanent pacemaker placement.  I discussed all the above  with him and recommendation for permanent pacemaker.  Went over risks and benefits of a pacemaker which includes, but not limited to rare risk of pneumothorax, hemothorax, cardiac perforation, device recall or dislodgment, bleeding, infection.  Patient would be agreeable.  - Referral to electrophysiology for permanent pacemaker placement  - [facility], any ECG that might show atrial fibrillation, and echo    2.  Reported history of paroxysmal atrial fibrillation, but have not found documentation.  [facility].  Sounds like he had been on atenolol before in the past.  Perhaps he was taken off due to first-degree AV block not sure.  He currently is now on metoprolol 25 mg p.o. twice daily.  For now we will decrease to 12.5 mg p.o. twice daily.  - Get records to document atrial fibrillation  - Decrease metoprolol for now to 12.5 mg p.o. twice daily    3.[id number] Score of 3, if you count diabetes.  When he turns 73, I will also be 3.  Discussed with patient risk for stroke from atrial fibrillation is about 3 to 4 %/year.  Discussed recommendation to consider direct thrombin inhibitor.  However I think we will hold off at this time until pacemaker has been placed and actual documentation of atrial fibrillation has been obtained [facility] the [facility].    We will determine when follow-up is needed after patient is evaluated by electrophysiology for pacemaker implant.  [facility] the [facility]:  Xi abrnard dated 4/4/2023, the actual rhythm strips not available for review, but the report under final impression is symptomatic supraventricular tachycardia.  They do mention that he had 836 episodes of supraventricular tachycardia.  Longest episode was 17 minutes and was symptomatic for palpitations, lightheadedness.  This narrow complex tachycardia had average rate of 1 30-1 40 but sometimes went as fast as 170.  There is mention he had 1 episode of  junctional rhythm posttermination with a 2.2-second pause.    With this results, I still think then up dual-chamber pacemaker is indicated.  However, I am not quite sure about the documentation of atrial fibrillation.  I think once the pacemaker is in, interrogations will inform us if he will have further episodes.  Also once the pacemaker is in, we can titrate up metoprolol.    Shared Medical Decision Making:  All of patient's questions were answered to the best of my knowledge and to patient's satisfaction. It was a pleasure seeing Mr. Andreas Ortega in my clinic today. Return if symptoms worsen or fail to improve. Patient is aware to call the cardiology clinic with any questions or concerns.    Gisele Cannon MD  [facility]  [address]  [place] [address]  Phone:  [phone]  Fax:  [phone]    Please note that this dictation was created using voice recognition software. I have made every reasonable attempt to correct obvious errors, but it is possible there are errors of grammar and possibly content that I did not discover before finalizing the note.

## 2023-06-23 ENCOUNTER — OFFICE VISIT (OUTPATIENT)
Dept: CARDIOLOGY | Facility: MEDICAL CENTER | Age: 73
End: 2023-06-23
Attending: INTERNAL MEDICINE
Payer: COMMERCIAL

## 2023-06-23 ENCOUNTER — TELEPHONE (OUTPATIENT)
Dept: CARDIOLOGY | Facility: MEDICAL CENTER | Age: 73
End: 2023-06-23

## 2023-06-23 VITALS
WEIGHT: 225 LBS | RESPIRATION RATE: 18 BRPM | DIASTOLIC BLOOD PRESSURE: 88 MMHG | BODY MASS INDEX: 27.98 KG/M2 | HEART RATE: 59 BPM | HEIGHT: 75 IN | SYSTOLIC BLOOD PRESSURE: 154 MMHG | OXYGEN SATURATION: 97 %

## 2023-06-23 DIAGNOSIS — I48.0 PAROXYSMAL ATRIAL FIBRILLATION (HCC): ICD-10-CM

## 2023-06-23 DIAGNOSIS — R55 SYNCOPE AND COLLAPSE: ICD-10-CM

## 2023-06-23 DIAGNOSIS — I47.10 SVT (SUPRAVENTRICULAR TACHYCARDIA) (HCC): ICD-10-CM

## 2023-06-23 DIAGNOSIS — I45.10 RBBB: ICD-10-CM

## 2023-06-23 DIAGNOSIS — I49.5 SICK SINUS SYNDROME (HCC): ICD-10-CM

## 2023-06-23 DIAGNOSIS — I44.0 FIRST DEGREE ATRIOVENTRICULAR BLOCK: ICD-10-CM

## 2023-06-23 LAB — EKG IMPRESSION: NORMAL

## 2023-06-23 PROCEDURE — 93005 ELECTROCARDIOGRAM TRACING: CPT | Performed by: INTERNAL MEDICINE

## 2023-06-23 PROCEDURE — 99214 OFFICE O/P EST MOD 30 MIN: CPT | Performed by: INTERNAL MEDICINE

## 2023-06-23 PROCEDURE — 3079F DIAST BP 80-89 MM HG: CPT | Performed by: INTERNAL MEDICINE

## 2023-06-23 PROCEDURE — 99212 OFFICE O/P EST SF 10 MIN: CPT | Performed by: INTERNAL MEDICINE

## 2023-06-23 PROCEDURE — 3077F SYST BP >= 140 MM HG: CPT | Performed by: INTERNAL MEDICINE

## 2023-06-23 PROCEDURE — 93010 ELECTROCARDIOGRAM REPORT: CPT | Performed by: INTERNAL MEDICINE

## 2023-06-23 ASSESSMENT — FIBROSIS 4 INDEX: FIB4 SCORE: 3.49

## 2023-06-23 NOTE — PROGRESS NOTES
Chief Complaint   Patient presents with    New Patient     N/P Dx:Paroxysmal atrial fibrillation (HCC)       Subjective     Andreas Ortega is a 72 y.o. male who presents today being seen secondary to syncopal spell.  Sinus rhythm with long first-degree AV block and right bundle branch block.  [facility]  [facility] with some p[facility] but not prolonged.  Symptomatic SVT seen. Possible a fib in past. Currently not on anticoagulation.  Denies smoking or alcohol use.  Former [place] war .  Retired contractor.  .  No chest pain or shortness of breath.  Syncope was without vagal components.  No warning.  Had a broken hip that required surgery.  No history of chest pain or heart failure.  No previous CVA.  Hypertension.  History of prior prostate cancer and renal cancer.    Past Medical History:   Diagnosis Date    Arthritis     hands, knees, shoulders    Backpain     Cancer (HCC)     prostectomy, R kidney removal, skin    Diabetes     borderline    Fall     3 days ago    Hypertension     Prostate cancer (HCC)     Renal cancer (HCC)     Skin cancer      Past Surgical History:   Procedure Laterality Date    PB PARTIAL HIP REPLACEMENT  5/16/2023    Procedure: HEMIARTHROPLASTY, HIP;  Surgeon: Denver Carrillo M.D.;  Location: SURGERY [facility];  Service: Orthopedics    GASTROSCOPY WITH BIOPSY  1/23/2012    Performed by OBDULIO ALEJANDRA at [facility] ORS    EGD W/ENDOSCOPIC ULTRASOUND  1/23/2012    Performed by OBDULIO ALEJANDRA at [facility] ORS    ERCP W/SPHINCTEROTOMY/PAPILL.  1/23/2012    Performed by OBDULIO ALEJANDRA at [facility] ORS    ERCP W/REMOVAL CALCULUS  1/23/2012    Performed by OBDULIO ALEJANDRA at [facility] ORS    PROSTATECTOMY, RADIAL  2005    OTHER  1984    Right kidney removed    OTHER ORTHOPEDIC SURGERY  1974    R knee surgery, repair    APPENDECTOMY  1966     Family History   Problem Relation Age of Onset     Cancer Unknown     Diabetes Unknown     Hypertension Unknown     Heart Disease Unknown     Stroke Unknown      Social History     Socioeconomic History    Marital status:      Spouse name: Not on file    Number of children: Not on file    Years of education: Not on file    Highest education level: Not on file   Occupational History    Not on file   Tobacco Use    Smoking status: Former     Types: Cigarettes    Smokeless tobacco: Former     Quit date: 5/30/1984    Tobacco comments:     Quit in 1984   Vaping Use    Vaping Use: Never used   Substance and Sexual Activity    Alcohol use: No    Drug use: No    Sexual activity: Not on file   Other Topics Concern    Not on file   Social History Narrative    Not on file     Social Determinants of Health     Financial Resource Strain: Not on file   Food Insecurity: Not on file   Transportation Needs: Not on file   Physical Activity: Not on file   Stress: Not on file   Social Connections: Not on file   Intimate Partner Violence: Not on file   Housing Stability: Not on file     Allergies   Allergen Reactions    Crestor [Rosuvastatin Calcium]     Levaquin     Metformin     Naproxen     Pcn [Penicillins]     Pravastatin     Simvastatin     Statins [Hmg-Coa-R Inhibitors]      Outpatient Encounter Medications as of 6/23/2023   Medication Sig Dispense Refill    gabapentin (NEURONTIN) 100 MG Cap Take 1 Capsule by mouth 2 times a day. 90 Capsule 0    metoprolol tartrate (LOPRESSOR) 25 MG Tab Take 1 Tablet by mouth 2 times a day. 60 Tablet 0    pregabalin (LYRICA) 150 MG Cap Take 150 mg by mouth 2 times a day.      oxyCODONE immediate release (ROXICODONE) 10 MG immediate release tablet Take 10 mg by mouth every 6 hours as needed (for pain).      vitamin D2, Ergocalciferol, (DRISDOL) 1.25 MG (11742 UT) Cap capsule Take 50,000 Units by mouth every 7 days.      allopurinol (ZYLOPRIM) 300 MG Tab Take 150 mg by mouth every day.      Cyanocobalamin (VITAMIN B-12) 1000 MCG Tab Take  "2,000 mcg by mouth every day.      Magnesium Oxide 420 MG Tab Take 420 mg by mouth every day.      hydroCHLOROthiazide (HYDRODIURIL) 12.5 MG tablet Take 12.5 mg by mouth every morning.      [DISCONTINUED] fenofibrate (TRICOR) 145 MG Tab Take 145 mg by mouth every day. (Patient not taking: Reported on 6/23/2023)       No facility-administered encounter medications on file as of 6/23/2023.     ROS           Objective     BP (!) 154/88 (BP Location: Left arm, Patient Position: Sitting, BP Cuff Size: Adult)   Pulse (!) 59   Resp 18   Ht 1.905 m (6' 3\")   Wt 102 kg (225 lb)   SpO2 97%   BMI 28.12 kg/m²     Physical Exam  Constitutional:       Appearance: He is well-developed.   HENT:      Head: Normocephalic and atraumatic.   Cardiovascular:      Rate and Rhythm: Normal rate and regular rhythm.      Heart sounds: No murmur heard.     No friction rub. No gallop.   Pulmonary:      Effort: Pulmonary effort is normal.      Breath sounds: Normal breath sounds.   Abdominal:      Palpations: Abdomen is soft.   Musculoskeletal:         General: Normal range of motion.      Cervical back: Normal range of motion and neck supple.   Skin:     General: Skin is warm and dry.   Neurological:      Mental Status: He is alert and oriented to person, place, and time.   Psychiatric:         Behavior: Behavior normal.         Thought Content: Thought content normal.         Judgment: Judgment normal.                Assessment & Plan     1. Paroxysmal atrial fibrillation (HCC)  EKG          Medical Decision Making: Today's Assessment/Status/Plan:   1.  Sinus rhythm with fairly long first-degree block and right bundle branch block and PAF.  Agree with placement of permanent pacemaker.  If symptomatic atrial fibrillation in the future could consider antiarrhythmics if needed.  Also anticoagulation may be required..  The risks, benefits, and alternatives to permanent pacemaker placement were discussed in great detail.  Specific risks " mentioned to the patient including bleeding, cardiac perforation with possible tamponade possibly requiring pericardiocentesis or open heart surgery.  In addition the possibility of lead dislodgment (2-3%), pneumothorax (3%), hemothorax, infection were discussed.  Also, mentioned were the risk of death, stroke, and myocardial infarction.  The patient verbalized understanding of the potential complications and wishes to proceed with the procedure.  2.  SVT.  Possible AVNRT [facility]. patient will be able to help monitor this.  May require escalation medications or ablation.  3.  PAF unclear whether he has this.  4.  Normal echo.

## 2023-06-26 NOTE — TELEPHONE ENCOUNTER
Patient scheduled for PM insert on 7-21-23 with Dr. Bergeron. Patient has been instructed to check in at 8:30 for 10:30 case time. Message sent to nathaniel Mackenzie with [place] notified.

## 2023-06-28 NOTE — TELEPHONE ENCOUNTER
Due to a change in the schedule - this case will have to be rescheduled. Called and LM on patient's voicemail requesting a call back to discuss a different day.

## 2023-06-28 NOTE — TELEPHONE ENCOUNTER
Patient scheduled for PM insert on 7-13-23 with Dr. Bergeron. Patient has been instructed to check in at 11:00 for 1:00 case time. Message sent to nathaniel Mackenzie with [place] notified.

## 2023-07-07 ENCOUNTER — APPOINTMENT (OUTPATIENT)
Dept: ADMISSIONS | Facility: MEDICAL CENTER | Age: 73
End: 2023-07-07
Attending: INTERNAL MEDICINE
Payer: COMMERCIAL

## 2023-07-11 ENCOUNTER — PRE-ADMISSION TESTING (OUTPATIENT)
Dept: ADMISSIONS | Facility: MEDICAL CENTER | Age: 73
End: 2023-07-11
Attending: INTERNAL MEDICINE
Payer: COMMERCIAL

## 2023-07-11 RX ORDER — ACETAMINOPHEN 500 MG
250 TABLET ORAL EVERY 6 HOURS PRN
Status: ON HOLD | COMMUNITY
End: 2023-07-13

## 2023-07-11 RX ORDER — ALOGLIPTIN 25 MG/1
25 TABLET, FILM COATED ORAL EVERY EVENING
COMMUNITY

## 2023-07-13 ENCOUNTER — HOSPITAL ENCOUNTER (OUTPATIENT)
Facility: MEDICAL CENTER | Age: 73
End: 2023-07-13
Attending: INTERNAL MEDICINE | Admitting: INTERNAL MEDICINE
Payer: COMMERCIAL

## 2023-07-13 ENCOUNTER — APPOINTMENT (OUTPATIENT)
Dept: RADIOLOGY | Facility: MEDICAL CENTER | Age: 73
End: 2023-07-13
Attending: INTERNAL MEDICINE
Payer: COMMERCIAL

## 2023-07-13 ENCOUNTER — APPOINTMENT (OUTPATIENT)
Dept: CARDIOLOGY | Facility: MEDICAL CENTER | Age: 73
End: 2023-07-13
Attending: INTERNAL MEDICINE
Payer: COMMERCIAL

## 2023-07-13 VITALS
SYSTOLIC BLOOD PRESSURE: 146 MMHG | BODY MASS INDEX: 27.99 KG/M2 | HEART RATE: 65 BPM | DIASTOLIC BLOOD PRESSURE: 95 MMHG | RESPIRATION RATE: 17 BRPM | WEIGHT: 225.09 LBS | TEMPERATURE: 96.8 F | OXYGEN SATURATION: 95 % | HEIGHT: 75 IN

## 2023-07-13 DIAGNOSIS — R55 SYNCOPE AND COLLAPSE: ICD-10-CM

## 2023-07-13 DIAGNOSIS — I49.5 SICK SINUS SYNDROME (HCC): ICD-10-CM

## 2023-07-13 LAB
ALBUMIN SERPL BCP-MCNC: 4.6 G/DL (ref 3.2–4.9)
ALBUMIN/GLOB SERPL: 2 G/DL
ALP SERPL-CCNC: 94 U/L (ref 30–99)
ALT SERPL-CCNC: 18 U/L (ref 2–50)
ANION GAP SERPL CALC-SCNC: 12 MMOL/L (ref 7–16)
AST SERPL-CCNC: 17 U/L (ref 12–45)
BILIRUB SERPL-MCNC: 1.7 MG/DL (ref 0.1–1.5)
BUN SERPL-MCNC: 17 MG/DL (ref 8–22)
CALCIUM ALBUM COR SERPL-MCNC: 9.4 MG/DL (ref 8.5–10.5)
CALCIUM SERPL-MCNC: 9.9 MG/DL (ref 8.5–10.5)
CHLORIDE SERPL-SCNC: 101 MMOL/L (ref 96–112)
CO2 SERPL-SCNC: 27 MMOL/L (ref 20–33)
CREAT SERPL-MCNC: 1 MG/DL (ref 0.5–1.4)
EKG IMPRESSION: NORMAL
EKG IMPRESSION: NORMAL
ERYTHROCYTE [DISTWIDTH] IN BLOOD BY AUTOMATED COUNT: 47.2 FL (ref 35.9–50)
GFR SERPLBLD CREATININE-BSD FMLA CKD-EPI: 79 ML/MIN/1.73 M 2
GLOBULIN SER CALC-MCNC: 2.3 G/DL (ref 1.9–3.5)
GLUCOSE SERPL-MCNC: 123 MG/DL (ref 65–99)
HCT VFR BLD AUTO: 49 % (ref 42–52)
HGB BLD-MCNC: 16.4 G/DL (ref 14–18)
INR PPP: 0.92 (ref 0.87–1.13)
MCH RBC QN AUTO: 31.1 PG (ref 27–33)
MCHC RBC AUTO-ENTMCNC: 33.5 G/DL (ref 32.3–36.5)
MCV RBC AUTO: 92.8 FL (ref 81.4–97.8)
PLATELET # BLD AUTO: 273 K/UL (ref 164–446)
PMV BLD AUTO: 9.5 FL (ref 9–12.9)
POTASSIUM SERPL-SCNC: 3.9 MMOL/L (ref 3.6–5.5)
PROT SERPL-MCNC: 6.9 G/DL (ref 6–8.2)
PROTHROMBIN TIME: 12.3 SEC (ref 12–14.6)
RBC # BLD AUTO: 5.28 M/UL (ref 4.7–6.1)
SODIUM SERPL-SCNC: 140 MMOL/L (ref 135–145)
WBC # BLD AUTO: 7.9 K/UL (ref 4.8–10.8)

## 2023-07-13 PROCEDURE — 85610 PROTHROMBIN TIME: CPT

## 2023-07-13 PROCEDURE — 93005 ELECTROCARDIOGRAM TRACING: CPT | Performed by: INTERNAL MEDICINE

## 2023-07-13 PROCEDURE — 93010 ELECTROCARDIOGRAM REPORT: CPT | Mod: 59 | Performed by: INTERNAL MEDICINE

## 2023-07-13 PROCEDURE — 71045 X-RAY EXAM CHEST 1 VIEW: CPT

## 2023-07-13 PROCEDURE — 80053 COMPREHEN METABOLIC PANEL: CPT

## 2023-07-13 PROCEDURE — 700102 HCHG RX REV CODE 250 W/ 637 OVERRIDE(OP): Performed by: INTERNAL MEDICINE

## 2023-07-13 PROCEDURE — 33208 INSRT HEART PM ATRIAL & VENT: CPT | Mod: KX | Performed by: INTERNAL MEDICINE

## 2023-07-13 PROCEDURE — 99152 MOD SED SAME PHYS/QHP 5/>YRS: CPT | Performed by: INTERNAL MEDICINE

## 2023-07-13 PROCEDURE — 700111 HCHG RX REV CODE 636 W/ 250 OVERRIDE (IP): Mod: JZ

## 2023-07-13 PROCEDURE — 160035 HCHG PACU - 1ST 60 MINS PHASE I

## 2023-07-13 PROCEDURE — 93010 ELECTROCARDIOGRAM REPORT: CPT | Mod: 59,76 | Performed by: INTERNAL MEDICINE

## 2023-07-13 PROCEDURE — 85027 COMPLETE CBC AUTOMATED: CPT

## 2023-07-13 PROCEDURE — 700101 HCHG RX REV CODE 250

## 2023-07-13 PROCEDURE — 99153 MOD SED SAME PHYS/QHP EA: CPT

## 2023-07-13 PROCEDURE — A9270 NON-COVERED ITEM OR SERVICE: HCPCS | Performed by: INTERNAL MEDICINE

## 2023-07-13 PROCEDURE — 160036 HCHG PACU - EA ADDL 30 MINS PHASE I

## 2023-07-13 PROCEDURE — 160002 HCHG RECOVERY MINUTES (STAT)

## 2023-07-13 RX ORDER — TRAMADOL HYDROCHLORIDE 50 MG/1
50 TABLET ORAL EVERY 6 HOURS PRN
Status: DISCONTINUED | OUTPATIENT
Start: 2023-07-13 | End: 2023-07-13 | Stop reason: HOSPADM

## 2023-07-13 RX ORDER — ACETAMINOPHEN 325 MG/1
650 TABLET ORAL EVERY 4 HOURS PRN
Status: DISCONTINUED | OUTPATIENT
Start: 2023-07-13 | End: 2023-07-13 | Stop reason: HOSPADM

## 2023-07-13 RX ORDER — CLONIDINE HYDROCHLORIDE 0.1 MG/1
0.1 TABLET ORAL ONCE
Status: DISCONTINUED | OUTPATIENT
Start: 2023-07-13 | End: 2023-07-13 | Stop reason: HOSPADM

## 2023-07-13 RX ORDER — CEFAZOLIN SODIUM 1 G/3ML
INJECTION, POWDER, FOR SOLUTION INTRAMUSCULAR; INTRAVENOUS
Status: COMPLETED
Start: 2023-07-13 | End: 2023-07-13

## 2023-07-13 RX ORDER — LIDOCAINE HYDROCHLORIDE 20 MG/ML
INJECTION, SOLUTION INFILTRATION; PERINEURAL
Status: COMPLETED
Start: 2023-07-13 | End: 2023-07-13

## 2023-07-13 RX ORDER — CLONIDINE HYDROCHLORIDE 0.1 MG/1
0.1 TABLET ORAL ONCE
Status: COMPLETED | OUTPATIENT
Start: 2023-07-13 | End: 2023-07-13

## 2023-07-13 RX ORDER — MIDAZOLAM HYDROCHLORIDE 1 MG/ML
INJECTION INTRAMUSCULAR; INTRAVENOUS
Status: COMPLETED
Start: 2023-07-13 | End: 2023-07-13

## 2023-07-13 RX ORDER — DOXYCYCLINE 100 MG/1
100 CAPSULE ORAL 2 TIMES DAILY
Qty: 8 CAPSULE | Refills: 0 | Status: SHIPPED | OUTPATIENT
Start: 2023-07-13 | End: 2023-11-15

## 2023-07-13 RX ADMIN — MIDAZOLAM 4 MG: 1 INJECTION, SOLUTION INTRAMUSCULAR; INTRAVENOUS at 15:51

## 2023-07-13 RX ADMIN — CEFAZOLIN 1 G: 1 INJECTION, POWDER, FOR SOLUTION INTRAMUSCULAR; INTRAVENOUS at 15:43

## 2023-07-13 RX ADMIN — LIDOCAINE HYDROCHLORIDE: 20 INJECTION, SOLUTION INFILTRATION; PERINEURAL at 15:42

## 2023-07-13 RX ADMIN — CLONIDINE HYDROCHLORIDE 0.1 MG: 0.1 TABLET ORAL at 17:26

## 2023-07-13 RX ADMIN — FENTANYL CITRATE 200 MCG: 50 INJECTION, SOLUTION INTRAMUSCULAR; INTRAVENOUS at 15:51

## 2023-07-13 RX ADMIN — CEFAZOLIN 2000 MG: 1 INJECTION, POWDER, FOR SOLUTION INTRAMUSCULAR; INTRAVENOUS at 15:19

## 2023-07-13 ASSESSMENT — FIBROSIS 4 INDEX: FIB4 SCORE: 3.49

## 2023-07-13 NOTE — PROGRESS NOTES
Pharmacy Medication Reconciliation      ~Medication reconciliation updated and complete per patient at bedside   ~Allergies have been verified and updated   ~No oral ABX within the last 30 days  ~Patient home pharmacy :  [facility]

## 2023-07-13 NOTE — OP REPORT
Electrophysiology Procedure Note  [facility]    PROCEDURE PERFORMED: Dual-chamber pacemaker0, moderate sedation administered by RN and supervised by physician    : Andreas Bergeron MD    ASSISTANT: none    ANESTHESIA: Moderate sedation,  start time 1528, stop time 1610  the moderate sedation document has been reviewed, signed and scanned into media     EBL: 30 cc    SPECIMENS: None    INDICATION: Syncope, bundle branch block, long first-degree AV block    PRE PROCEDURE ECG: Sinus rhythm with first-degree AV block and right bundle branch block    POST PROCEDURE ECG: Same as above     COMPLICATIONS: None    DESCRIPTION OF PROCEDURE:  After informed written consent, the patient was brought to the electrophysiology lab in the fasting, unsedated state. The patient was prepped and draped in the usual sterile fashion. The procedure was performed under moderate sedation with local anesthetic.   A left infraclavicular incision was made with a scalpel and the pectoral device pocket was created using a combination of blunt dissection and electrocautery. The modified Seldinger technique was used to gain access to the left axillary vein times two. Two peel-away hemostasis sheaths were placed in the vein. Under fluoroscopic guidance, the pacemaker leads were introduced into the heart. The ventricular lead was advanced to the RVOT and then lowered into position at the RV septum. The atrial lead was positioned in the right atrial appendage. The leads were fixated in normal mechanism. The leads were tested and had satisfactory sensing and pacing parameters. High output ventricular pacing did not produce extracardiac stimulation. The leads were sutured to the underlying pectoral muscle with interrupted non absorbable suture over a silastic suture sleeve. The device pocket was irrigated with antibiotic solution, inspected, and no bleeding was seen. The leads were connected to the dual chamber pacemaker pulse  generator and the device was inserted into the pocket The wound was closed with three layers of absorbable sutures.  I personally supervised the administration of moderate sedation by the RN and observed the level of consciousness and physiologic status throughout the procedure.  Following recovery from sedation, the patient was transferred to a monitored bed.    IMPLANTED DEVICE INFORMATION:  Pulse generator is a [employer] model L311  Serial #[id number]    LEAD INFORMATION:  1)Right atrial lead is a [employer] model #4470, serial #[id number],P wave 5 millivolts, threshold 0.5 volts, pacing impedance 457 ohms.    2)Right ventricular lead is a [employer] model #7842, serial #[id number],R wave 14 millivolts, threshold 0.7 volts, pacing impedance 786 ohms.    DEVICE PROGRAMMING:  DDDR with search    FLUOROSCOPY TIME: 164 mGy    IMPRESSIONS:  1. Successful dual-chamber pacemaker implantation    RECOMMENDATIONS:  1. Transfer to monitored bed  2. Chest x-ray  3. Device interrogation prior to hospital discharge  4. Followup in device clinic

## 2023-07-13 NOTE — DISCHARGE INSTRUCTIONS
HOME CARE INSTRUCTIONS    ACTIVITY: Rest and take it easy for the first 24 hours.  A responsible adult is recommended to remain with you during that time.  It is normal to feel sleepy.  We encourage you to not do anything that requires balance, judgment or coordination.    FOR 24 HOURS DO NOT:  Drive, operate machinery or run household appliances.  Drink beer or alcoholic beverages.  Make important decisions or sign legal documents.    SPECIAL INSTRUCTIONS: Pacemaker Discharge Instructions/[facility]    1.  No showers until seen in follow up; may take sponge bath.  Keep dressing dry & in place until seen at for you follow up visit at the cardiology office.     2.  No lifting over 10 lbs with affected arm for six weeks.  3.  Do not raise affected arm above shoulder level or behind head for six weeks.  4.  Avoid excessive pushing, pulling, or twisting for six weeks.  5.  No driving for the first week.  6.  Ok to place indirect ICE pack to site for comfort.   7.  Call our office ([phone]) if you notice any increased swelling, redness, warmth, or drainage at the implant site.  8.  Needs to be seen in emergency if you develop fever > 101F or uncontrolled pain.  9.  Always check with device clinic before any planned MRI to see if device is MRI compatible.  10.  No routine dental work or cleanings for 3 months.  11.  May remove arm sling after one day, but please wear if you have trouble remembering to keep your arm down.  Please wear at night as a reminder.   12. Do not place cell phones or mobile devices directly over implanted device.   13. You will need to be seen at least twice in the device clinic for checks while the pacemaker is healing.  Expect appointment approximately one week after implant and 6-7 weeks post implant.     DIET: To avoid nausea, slowly advance diet as tolerated, avoiding spicy or greasy foods for the first day.  Add more substantial food to your diet according to your physician's  instructions.  Babies can be fed formula or breast milk as soon as they are hungry.  INCREASE FLUIDS AND FIBER TO AVOID CONSTIPATION.      MEDICATIONS: Resume taking daily medication.  Take prescribed pain medication with food.  If no medication is prescribed, you may take non-aspirin pain medication if needed.  PAIN MEDICATION CAN BE VERY CONSTIPATING.  Take a stool softener or laxative such as senokot, pericolace, or milk of magnesia if needed.      A follow-up appointment should be arranged with your doctor in 1-2 weeks; call to schedule.    You should CALL YOUR PHYSICIAN if you develop:  Fever greater than 101 degrees F.  Pain not relieved by medication, or persistent nausea or vomiting.  Excessive bleeding (blood soaking through dressing) or unexpected drainage from the wound.  Extreme redness or swelling around the incision site, drainage of pus or foul smelling drainage.  Inability to urinate or empty your bladder within 8 hours.  Problems with breathing or chest pain.    You should call 911 if you develop problems with breathing or chest pain.  If you are unable to contact your doctor or surgical center, you should go to the nearest emergency room or urgent care center.  Physician's telephone #: [phone]    MILD FLU-LIKE SYMPTOMS ARE NORMAL.  YOU MAY EXPERIENCE GENERALIZED MUSCLE ACHES, THROAT IRRITATION, HEADACHE AND/OR SOME NAUSEA.    If any questions arise, call your doctor.  If your doctor is not available, please feel free to call the Surgical Center at [phone].  The Center is open Monday through Friday from 7AM to 7PM.      A registered nurse may call you a few days after your surgery to see how you are doing after your procedure.    You may also receive a survey in the mail within the next two weeks and we ask that you take a few moments to complete the survey and return it to us.  Our goal is to provide you with very good care and we value your comments.     Depression / Suicide Risk    As  you are discharged from this [facility] facility, it is important to learn how to keep safe from harming yourself.    Recognize the warning signs:  Abrupt changes in personality, positive or negative- including increase in energy   Giving away possessions  Change in eating patterns- significant weight changes-  positive or negative  Change in sleeping patterns- unable to sleep or sleeping all the time   Unwillingness or inability to communicate  Depression  Unusual sadness, discouragement and loneliness  Talk of wanting to die  Neglect of personal appearance   Rebelliousness- reckless behavior  Withdrawal from people/activities they love  Confusion- inability to concentrate     If you or a loved one observes any of these behaviors or has concerns about self-harm, here's what you can do:  Talk about it- your feelings and reasons for harming yourself  Remove any means that you might use to hurt yourself (examples: pills, rope, extension cords, firearm)  Get professional help from the community (Mental Health, Substance Abuse, psychological counseling)  Do not be alone:Call your Safe Contact- someone whom you trust who will be there for you.  Call your local CRISIS HOTLINE 647-0195 or 003-266-9157  Call your local Children's Mobile Crisis Response Team [place] (469) 469-2418 or www.Computerlogy  Call the toll free National Suicide Prevention Hotlines   National Suicide Prevention Lifeline 755-277-ZKWD (5548)  National Hope Line Network 800-SUICIDE (928-5684)    I acknowledge receipt and understanding of these Home Care instructions.

## 2023-07-14 ENCOUNTER — TELEPHONE (OUTPATIENT)
Dept: CARDIOLOGY | Facility: MEDICAL CENTER | Age: 73
End: 2023-07-14

## 2023-07-14 NOTE — OR NURSING
No complaints of pain or nausea. VSS. Pt on room air. Afebrile. L upper chest pacer site, dressing CDI. Sling on to LUE. IV d/c'd, dressing in place. Assisted pt to dress. Pacer monitor provided to pt by [employer] rep. D/C instructions reviewed with pt and wife.

## 2023-07-14 NOTE — OR NURSING
Per Dr. Bergeron, pt okay to d/c and resume antihypertensives at home. Notified Dr. Bergeron of pt's latest BPs post clonidine administration and okay to d/c.

## 2023-07-14 NOTE — OR NURSING
Notified Dr. Bergeron, pt remains hypertensive  - -190's. Received order for clonidine 0.1 mg one time dose PO.

## 2023-07-14 NOTE — TELEPHONE ENCOUNTER
[facility] to let [facility] know it is okay to switch to Hyclate 100mg.    Xolair Counseling:  Patient informed of potential adverse effects including but not limited to fever, muscle aches, rash and allergic reactions.  The patient verbalized understanding of the proper use and possible adverse effects of Xolair.  All of the patient's questions and concerns were addressed.

## 2023-07-14 NOTE — TELEPHONE ENCOUNTER
ARLEN        Caller: Rasheed with [facility]      Topic/issue: Their office was calling about the patient's doxycycline (MONODOX) 100 MG capsule medication. They don't carry that medication. The one that they carry is the Hyclate 100 mg tablets and they were asking for a call back      Callback Number: see below      Thank you      -Hadley GASCA

## 2023-07-20 ENCOUNTER — NON-PROVIDER VISIT (OUTPATIENT)
Dept: CARDIOLOGY | Facility: MEDICAL CENTER | Age: 73
End: 2023-07-20
Attending: NURSE PRACTITIONER
Payer: COMMERCIAL

## 2023-07-20 DIAGNOSIS — R55 SYNCOPE AND COLLAPSE: ICD-10-CM

## 2023-07-20 DIAGNOSIS — Z95.0 CARDIAC PACEMAKER IN SITU: ICD-10-CM

## 2023-07-20 DIAGNOSIS — I49.5 SICK SINUS SYNDROME (HCC): ICD-10-CM

## 2023-07-20 PROCEDURE — 93280 PM DEVICE PROGR EVAL DUAL: CPT | Performed by: NURSE PRACTITIONER

## 2023-07-20 NOTE — PROGRESS NOTES
7/20/2023  [id number]  Andreas Huerta Knee    Device site checked and bandage removed. Area is clean dry and intact. Healing well. Explained steri strips. Patient encouraged to reach out with any questions we will continue to monitor via remote and annual in office checks.     Device Type:[employer] Dual Chamber Pacemaker  AP%:   63 %:52  Battery Longevity: 8+years  Lead Impedance:stable and within normal limits  Indication: syncope, bundle branch and long 1st degree AV block  Events: none    Device interrogated and programmed by Venu Moseley

## 2023-07-26 ENCOUNTER — TELEPHONE (OUTPATIENT)
Dept: CARDIOLOGY | Facility: MEDICAL CENTER | Age: 73
End: 2023-07-26
Payer: COMMERCIAL

## 2023-07-26 NOTE — TELEPHONE ENCOUNTER
ARLEN    Caller: Andreas Huerta Knee     Topic/issue: Pt called in regards to having constant Afib and having rapid heart rate being around 140-150. pt stated that he has a pacemaker and he was told by DS that he might prescribe medication after the pacemaker he would like a call back to discuss, please advise.     Callback Number: [phone] (home)     Thank you,     Fletcher CASILLAS

## 2023-07-26 NOTE — TELEPHONE ENCOUNTER
Patient called, and he states that he has had a few episodes of rapid heart rate since having the pacemaker placed. He states the episodes will last 15-20 minutes, and during them his heart rate maintains 140's/150's. He does have chest pressure and weakness during these episodes. He has not been checking his BP around these episodes, but will begin to. He says that it typically resolves quickly once he sits or lays down, and symptoms resolve once episode ends.     He states that  told him to only take 12.5mg BID of metoprolol for now, rather than the ordered 25mg. Confirmed in her progress note 6/6/2023. Patient is not on anticoagulation. ER precautions provided for worsening symptoms or an episode where his HR maintains that high for over 30 minutes.     To SS: DS out of office, please see above and advise recommendations. Thank you!

## 2023-07-27 NOTE — TELEPHONE ENCOUNTER
ARLEN          Caller: Andreas Ortega      Topic/issue: Patient was calling about his high heart rates and his AFIB. He wanted a call back. His heart rate is still running at the 140-150 range and it will happen in the evening and then it slows down after it lasts a few minutes and was asking for a call back    Callback Number: [phone]      Thank you    -Hadley GASCA

## 2023-07-28 NOTE — TELEPHONE ENCOUNTER
Remote transmission from 7/22/2023 shows normal device function and no episodes. Pt can send a new transmission by pushing the blue and white heart button and waiting by the machine until all the lights go solid.

## 2023-07-28 NOTE — TELEPHONE ENCOUNTER
Phone Number Called: [phone]    Call outcome: Spoke to patient regarding message below.    Message: Called to follow up with patient regarding his heart rate concerns. Advised that we are waiting for recommendations from SS. Patient wife states that the elevated heart rate happened 3 times yesterday, 2 times the day before, and 2 times the day before that. Patient has lower heart rate after he sits down. Patient has not passed out per patient wife.     Metoprolol Tartrate 12.5 MG BID. Patient was previously on 25 MG BID. Patient states that he had fewer episodes when on 25 MG BID. Patient had no side effects.

## 2023-07-28 NOTE — TELEPHONE ENCOUNTER
DS    Caller: Ileana Ortega (Spouse)     Topic/issue: Pt Spouse called back in regards to messages above, she is concerned and upset she was told they were going to give a phone call back soon and never did yesterday, I gave Ileana our response times,     She is really concerned they stated that they need this medication as soon as possible and would like a call back please advise.    Callback Number: [phone] (home)      Thank you,     Fletcher CASILLAS

## 2023-07-31 ENCOUNTER — TELEPHONE (OUTPATIENT)
Dept: CARDIOLOGY | Facility: MEDICAL CENTER | Age: 73
End: 2023-07-31
Payer: COMMERCIAL

## 2023-07-31 NOTE — TELEPHONE ENCOUNTER
VIDAL Florian.  You 3 days ago     MT  We just need to call and check in with him on Monday morning. Upped his dose of metoprolol to 25 twice a day to see if that helps. Latonya also had a great idea about making adjustments to his device if need be

## 2023-07-31 NOTE — TELEPHONE ENCOUNTER
TERRY--Spoke with patient--patient states he did not have any further episodes over the weekend after taking full dose of Metoprolol.  Advised patient to contact office should he have any further symptoms/episodes-verbalized understanding and appreciative of call.

## 2023-08-23 ENCOUNTER — TELEPHONE (OUTPATIENT)
Dept: CARDIOLOGY | Facility: MEDICAL CENTER | Age: 73
End: 2023-08-23
Payer: COMMERCIAL

## 2023-08-23 DIAGNOSIS — I47.10 SVT (SUPRAVENTRICULAR TACHYCARDIA) (HCC): ICD-10-CM

## 2023-08-23 DIAGNOSIS — I48.0 PAROXYSMAL ATRIAL FIBRILLATION (HCC): ICD-10-CM

## 2023-08-23 DIAGNOSIS — R55 SYNCOPE, UNSPECIFIED SYNCOPE TYPE: ICD-10-CM

## 2023-08-23 RX ORDER — METOPROLOL TARTRATE 50 MG/1
50 TABLET, FILM COATED ORAL 2 TIMES DAILY
Qty: 180 TABLET | Refills: 3 | Status: SHIPPED | OUTPATIENT
Start: 2023-08-23

## 2023-08-23 NOTE — TELEPHONE ENCOUNTER
ARLEN  Caller: Andreas Ortega     Topic/issue:   Pacer recently placed 7/13/23  Patient is now experiencing the following :  Fast heart beat   feels like blacking out,- resolves with sitting.     Sometimes the episode comes back quickly, sometimes it goes away completley .    Patient states heart rate is between 120-130 or more during episode.    Callback Number: [phone]     Thank you   Ninfa SQUIRES

## 2023-08-23 NOTE — TELEPHONE ENCOUNTER
Andreas Bergeron M.D. 1 hour ago (2:43 PM)       Increase metoprolol to 50 BID.Get sevice interrogated

## 2023-08-23 NOTE — TELEPHONE ENCOUNTER
Called and spoke with patient.  These episodes and symptoms are not new for patient.   Patient states his episodes are less frequent with metoprolol but still happening about once or twice weekly.  He has discussed these episodes with DS before.    Patient is requesting medication to help with these episodes and symptoms.     DS, any recommendations?

## 2023-08-23 NOTE — TELEPHONE ENCOUNTER
Spoke with patient regarding DS recommendations.  Advised patient somebody from our device team with reach out regarding device interrogation.    Patient verbalized understanding and is appreciative.

## 2023-08-23 NOTE — TELEPHONE ENCOUNTER
Patient transmitted via home monitor--possibly symptomatic with PMT episodes. Patient scheduled for in office check 8/28 for possible reprogramming.

## 2023-08-28 ENCOUNTER — NON-PROVIDER VISIT (OUTPATIENT)
Dept: CARDIOLOGY | Facility: MEDICAL CENTER | Age: 73
End: 2023-08-28

## 2023-08-28 ENCOUNTER — NON-PROVIDER VISIT (OUTPATIENT)
Dept: CARDIOLOGY | Facility: MEDICAL CENTER | Age: 73
End: 2023-08-28
Attending: NURSE PRACTITIONER
Payer: COMMERCIAL

## 2023-08-28 DIAGNOSIS — Z95.0 CARDIAC PACEMAKER IN SITU: ICD-10-CM

## 2023-08-28 DIAGNOSIS — I49.5 SICK SINUS SYNDROME (HCC): ICD-10-CM

## 2023-08-28 PROCEDURE — 93280 PM DEVICE PROGR EVAL DUAL: CPT | Performed by: INTERNAL MEDICINE

## 2023-08-29 NOTE — CARDIAC REMOTE MONITOR - SCAN
Device transmission reviewed. Device demonstrated appropriate function.       Electronically Signed by: Lexis Mariscal M.D.    9/12/2023  3:31 PM

## 2023-08-29 NOTE — PROGRESS NOTES
Patient seen in device clinic-programming changes Accelerometer programmed on, PVARP to 300 ms, AV search to 300ms from 400 ms    Advised patient to keep log if he continues to have any further episodes

## 2023-09-12 PROCEDURE — 93280 PM DEVICE PROGR EVAL DUAL: CPT | Mod: 26 | Performed by: INTERNAL MEDICINE

## 2023-09-20 ENCOUNTER — TELEPHONE (OUTPATIENT)
Dept: CARDIOLOGY | Facility: MEDICAL CENTER | Age: 73
End: 2023-09-20
Payer: COMMERCIAL

## 2023-09-20 NOTE — TELEPHONE ENCOUNTER
Remote transmission received, pt had 1 SVT episode lasting >1 minute on 9/19/2023 @ 7:25 pm @ rate of approx 160 BPM.

## 2023-10-16 ENCOUNTER — TELEPHONE (OUTPATIENT)
Dept: CARDIOLOGY | Facility: MEDICAL CENTER | Age: 73
End: 2023-10-16
Payer: COMMERCIAL

## 2023-10-16 ENCOUNTER — NON-PROVIDER VISIT (OUTPATIENT)
Dept: CARDIOLOGY | Facility: MEDICAL CENTER | Age: 73
End: 2023-10-16
Payer: COMMERCIAL

## 2023-10-16 PROCEDURE — 93294 REM INTERROG EVL PM/LDLS PM: CPT | Performed by: INTERNAL MEDICINE

## 2023-10-16 NOTE — TELEPHONE ENCOUNTER
Phone Number Called: [phone]    Call outcome: Spoke to patient regarding message below.    Message: Called to inform patient of device transmission. Patient does not recall anything unusual or symptoms from that day. Patient states he overall feels very well

## 2023-10-16 NOTE — TELEPHONE ENCOUNTER
Remote transmission received via home monitor, questions of VT or SVT on 9/28/2023 @ 10:48 pm, no onset or offset seen, scan for review.

## 2023-10-17 NOTE — CARDIAC REMOTE MONITOR - SCAN
Device transmission reviewed. Device demonstrated appropriate function.       Electronically Signed by: Lexis Mariscal M.D.    11/7/2023  2:01 PM

## 2023-11-15 ENCOUNTER — OFFICE VISIT (OUTPATIENT)
Dept: CARDIOLOGY | Facility: MEDICAL CENTER | Age: 73
End: 2023-11-15
Attending: INTERNAL MEDICINE
Payer: COMMERCIAL

## 2023-11-15 VITALS
HEIGHT: 75 IN | HEART RATE: 68 BPM | WEIGHT: 227 LBS | RESPIRATION RATE: 18 BRPM | BODY MASS INDEX: 28.23 KG/M2 | DIASTOLIC BLOOD PRESSURE: 86 MMHG | SYSTOLIC BLOOD PRESSURE: 140 MMHG | OXYGEN SATURATION: 95 %

## 2023-11-15 DIAGNOSIS — I49.5 SICK SINUS SYNDROME (HCC): ICD-10-CM

## 2023-11-15 DIAGNOSIS — R55 SYNCOPE AND COLLAPSE: ICD-10-CM

## 2023-11-15 DIAGNOSIS — I47.10 SVT (SUPRAVENTRICULAR TACHYCARDIA) (HCC): ICD-10-CM

## 2023-11-15 DIAGNOSIS — Z95.0 CARDIAC PACEMAKER IN SITU: ICD-10-CM

## 2023-11-15 PROCEDURE — 99214 OFFICE O/P EST MOD 30 MIN: CPT | Mod: 25 | Performed by: INTERNAL MEDICINE

## 2023-11-15 PROCEDURE — 3079F DIAST BP 80-89 MM HG: CPT | Performed by: INTERNAL MEDICINE

## 2023-11-15 PROCEDURE — 3077F SYST BP >= 140 MM HG: CPT | Performed by: INTERNAL MEDICINE

## 2023-11-15 PROCEDURE — 99212 OFFICE O/P EST SF 10 MIN: CPT | Performed by: INTERNAL MEDICINE

## 2023-11-15 PROCEDURE — 93280 PM DEVICE PROGR EVAL DUAL: CPT | Performed by: INTERNAL MEDICINE

## 2023-11-15 PROCEDURE — 93280 PM DEVICE PROGR EVAL DUAL: CPT | Mod: 26 | Performed by: INTERNAL MEDICINE

## 2023-11-15 ASSESSMENT — FIBROSIS 4 INDEX: FIB4 SCORE: 1.1

## 2023-11-15 NOTE — PROGRESS NOTES
Chief Complaint   Patient presents with    Supraventricular Tachycardia (SVT)       Subjective     Andreas Ortega is a 73 y.o. male who presents today status post permanent pacemaker for sick sinus syndrome.  Feels improved.  Occasional palpitations.  Some SVT seen on device but not too troubling to the patient.  On beta-blockers.  No atrial fibrillation seen.    Past Medical History:   Diagnosis Date    A-fib (HCC) 07/11/2023    Arthritis     hands, knees, shoulders    Backpain     Breath shortness 07/11/2023    on exertion    Cancer (HCC)     prostectomy, R kidney removal, skin    Diabetes     borderline    Fall     3 days ago    Heart burn 07/11/2023    takes Pepsid    High cholesterol 07/11/2023    unable to tolerate statins    Hypertension     Indigestion     Prostate cancer (HCC)     Psychiatric problem 07/11/2023    situational anxiety    Renal cancer (HCC)     Skin cancer      Past Surgical History:   Procedure Laterality Date    PB PARTIAL HIP REPLACEMENT  5/16/2023    Procedure: HEMIARTHROPLASTY, HIP;  Surgeon: Denver Carrillo M.D.;  Location: SURGERY [facility];  Service: Orthopedics    GASTROSCOPY WITH BIOPSY  1/23/2012    Performed by OBDULIO ALEJANDRA at [facility] ORS    EGD W/ENDOSCOPIC ULTRASOUND  1/23/2012    Performed by OBDULIO ALEJANDRA at [facility] ORS    ERCP W/SPHINCTEROTOMY/PAPILL.  1/23/2012    Performed by OBDULIO ALEJANDRA at [facility] ORS    ERCP W/REMOVAL CALCULUS  1/23/2012    Performed by OBDULIO ALEJANDRA at [facility] ORS    PROSTATECTOMY, RADIAL  2005    OTHER  1984    Right kidney removed    OTHER ORTHOPEDIC SURGERY  1974    R knee surgery, repair    APPENDECTOMY  1966     Family History   Problem Relation Age of Onset    Cancer Unknown     Diabetes Unknown     Hypertension Unknown     Heart Disease Unknown     Stroke Unknown      Social History     Socioeconomic History    Marital status:      Spouse name: Not on file    Number of  children: Not on file    Years of education: Not on file    Highest education level: Not on file   Occupational History    Not on file   Tobacco Use    Smoking status: Former     Current packs/day: 0.00     Average packs/day: 2.0 packs/day for 18.0 years (36.0 ttl pk-yrs)     Types: Cigarettes     Start date:      Quit date:      Years since quittin.8     Passive exposure: Past    Smokeless tobacco: Former     Types: Chew, Snuff     Quit date:     Tobacco comments:     Quit in    Vaping Use    Vaping Use: Never used   Substance and Sexual Activity    Alcohol use: No    Drug use: No    Sexual activity: Not on file   Other Topics Concern    Not on file   Social History Narrative    Not on file     Social Determinants of Health     Financial Resource Strain: Not on file   Food Insecurity: Not on file   Transportation Needs: Not on file   Physical Activity: Not on file   Stress: Not on file   Social Connections: Not on file   Intimate Partner Violence: Not on file   Housing Stability: Not on file     Allergies   Allergen Reactions    Pcn [Penicillins] Anaphylaxis     As a child, throat swelled     Levofloxacin Nausea     Nausea, flushed and headache    Metformin Nausea     Nausea, flushed and headache    Pravastatin Nausea     Nausea,flushed and headache    Rosuvastatin Nausea     Flushed, headache      Simvastatin Nausea     Nausea, flushed and headache    Statins [Hmg-Coa-R Inhibitors] Nausea     Nausea, flushed and headache     Outpatient Encounter Medications as of 11/15/2023   Medication Sig Dispense Refill    metoprolol tartrate (LOPRESSOR) 50 MG Tab Take 1 Tablet by mouth 2 times a day. 180 Tablet 3    Alogliptin Benzoate 25 MG Tab Take 25 mg by mouth every evening.      pregabalin (LYRICA) 150 MG Cap Take 300 mg by mouth every evening.      oxyCODONE immediate release (ROXICODONE) 10 MG immediate release tablet Take 10 mg by mouth every 6 hours as needed (for pain).      vitamin D2,  "Ergocalciferol, (DRISDOL) 1.25 MG (25968 UT) Cap capsule Take 50,000 Units by mouth every 7 days. Sunday      allopurinol (ZYLOPRIM) 300 MG Tab Take 150 mg by mouth every day.      Cyanocobalamin (VITAMIN B-12) 1000 MCG Tab Take 2,000 mcg by mouth every day.      hydroCHLOROthiazide (HYDRODIURIL) 12.5 MG tablet Take 12.5 mg by mouth every morning.      [DISCONTINUED] doxycycline (MONODOX) 100 MG capsule Take 1 Capsule by mouth 2 times a day. 8 Capsule 0     No facility-administered encounter medications on file as of 11/15/2023.     ROS           Objective     BP (!) 140/86 (BP Location: Left arm, Patient Position: Sitting, BP Cuff Size: Adult)   Pulse 68   Resp 18   Ht 1.905 m (6' 3\")   Wt 103 kg (227 lb)   SpO2 95%   BMI 28.37 kg/m²     Physical Exam  Constitutional:       Appearance: He is well-developed.   HENT:      Head: Normocephalic and atraumatic.   Cardiovascular:      Rate and Rhythm: Normal rate and regular rhythm.      Heart sounds: No murmur heard.     No friction rub. No gallop.   Pulmonary:      Effort: Pulmonary effort is normal.      Breath sounds: Normal breath sounds.   Abdominal:      Palpations: Abdomen is soft.   Musculoskeletal:         General: Normal range of motion.      Cervical back: Normal range of motion and neck supple.   Skin:     General: Skin is warm and dry.   Neurological:      Mental Status: He is alert and oriented to person, place, and time.   Psychiatric:         Behavior: Behavior normal.         Thought Content: Thought content normal.         Judgment: Judgment normal.                Assessment & Plan     1. Paroxysmal atrial fibrillation (HCC)  CANCELED: EKG      2. Cardiac pacemaker in situ        3. SVT (supraventricular tachycardia)        4. Syncope and collapse        5. Sick sinus syndrome (HCC)            Medical Decision Making: Today's Assessment/Status/Plan:   1.  SVT continue beta-blockers.  2.  Question PAF none seen.  3.  Sick sinus syndrome status post " permanent pacemaker.  4.  Follow-up with me in 6 months.

## 2023-12-29 ENCOUNTER — TELEPHONE (OUTPATIENT)
Dept: CARDIOLOGY | Facility: MEDICAL CENTER | Age: 73
End: 2023-12-29
Payer: COMMERCIAL

## 2023-12-29 DIAGNOSIS — I48.0 PAROXYSMAL ATRIAL FIBRILLATION (HCC): ICD-10-CM

## 2023-12-29 NOTE — TELEPHONE ENCOUNTER
Phone Number Called: [phone]    Call outcome: Did not leave a detailed message. Requested patient to call back.    Message: Called to inform patient of device transmission and check for symptoms

## 2023-12-29 NOTE — TELEPHONE ENCOUNTER
Patient had surgery 10 days ago and was sent to rehab. That did not go well for the patient. He just got home yesterday. He feels well at home. He slept good last night. He feels 100% better today. Will have patient send us another transmission.    To device team: Please check for patient transmission?

## 2023-12-29 NOTE — TELEPHONE ENCOUNTER
Latonya Looney, Med Ass't  You; Ignacia Escalona, Med Ass't; Leeann Mack, Med Ass't6 minutes ago (1:27 PM)       Transmission received 12/29--patient continues in persistent AF/AFL.       To DS: Patient feels well. Not on OAP

## 2023-12-29 NOTE — TELEPHONE ENCOUNTER
Patients device transmitted via home monitor 12/28 6:30 pm--patient AF/AFL at time of transmission with onset 12/18/23.      Patient not on AC    SVT episodes noted  ?? Of VT episode 11/22/23    Report scanned for review.

## 2023-12-30 NOTE — TELEPHONE ENCOUNTER
Phone Number Called: [phone]    Call outcome: Left detailed message for patient. Informed to call back with any additional questions.    Message: Called to inform patient of DS recommendations. Left detailed message informing patient that he can start on Eliquis 5 MG BID if there is no bleeding from his surgery. Also advised of recommended follow up next week with someone in EP. Advised that DS did say that he can wait to start the blood thinner till the appointment next week if he would like. Advised I sent the prescription in case they wanted to start it.

## 2023-12-30 NOTE — TELEPHONE ENCOUNTER
If no bleeding from his surgery ? Type of surgery. He can start eliquis 5 mg BID and come in to see one of us. He can also wait until he comes in next week to see one of us to discuss.

## 2024-01-02 ENCOUNTER — TELEPHONE (OUTPATIENT)
Dept: CARDIOLOGY | Facility: MEDICAL CENTER | Age: 74
End: 2024-01-02

## 2024-01-02 NOTE — TELEPHONE ENCOUNTER
ARLEN        Caller: Rhea with [facility]      Topic/issue: Their office was calling to see if a form could be sent over to allow for the patient's continuation of care with medication refills       Callback Number: [phone]      Thank you      -Hadley GASCA

## 2024-01-04 NOTE — TELEPHONE ENCOUNTER
ARLEN  Caller: [facility] - [facility]    Topic/issue: [facility] did receive the [facility] but they specified that it needs to state 'Continuation of Care' in order to be authorized by the [facility]. This is an authorization needed by the [facility] in order to approve the medication refills.    Callback Number: [phone]     Thank you,  Wilal WOLFE

## 2024-01-15 ENCOUNTER — NON-PROVIDER VISIT (OUTPATIENT)
Dept: CARDIOLOGY | Facility: MEDICAL CENTER | Age: 74
End: 2024-01-15
Payer: COMMERCIAL

## 2024-01-15 PROCEDURE — 93294 REM INTERROG EVL PM/LDLS PM: CPT | Performed by: INTERNAL MEDICINE

## 2024-01-15 NOTE — CARDIAC REMOTE MONITOR - SCAN
Device transmission reviewed. Device demonstrated appropriate function. High AF/AFL burden.      Electronically Signed by: Lexis Mariscal M.D.    1/20/2024  1:12 PM

## 2024-04-15 ENCOUNTER — NON-PROVIDER VISIT (OUTPATIENT)
Dept: CARDIOLOGY | Facility: MEDICAL CENTER | Age: 74
End: 2024-04-15
Payer: COMMERCIAL

## 2024-04-15 PROCEDURE — 93294 REM INTERROG EVL PM/LDLS PM: CPT | Performed by: INTERNAL MEDICINE

## 2024-04-15 NOTE — CARDIAC REMOTE MONITOR - SCAN
Device transmission reviewed. Device demonstrated appropriate function.       Electronically Signed by: Andreas Bergeron M.D.    4/15/2024  12:24 PM

## 2024-05-14 ENCOUNTER — APPOINTMENT (OUTPATIENT)
Dept: CARDIOLOGY | Facility: MEDICAL CENTER | Age: 74
End: 2024-05-14
Attending: INTERNAL MEDICINE
Payer: COMMERCIAL

## 2024-05-20 ENCOUNTER — TELEPHONE (OUTPATIENT)
Dept: HEALTH INFORMATION MANAGEMENT | Facility: OTHER | Age: 74
End: 2024-05-20
Payer: COMMERCIAL

## 2024-08-18 ENCOUNTER — APPOINTMENT (OUTPATIENT)
Dept: RADIOLOGY | Facility: MEDICAL CENTER | Age: 74
DRG: 305 | End: 2024-08-18
Attending: EMERGENCY MEDICINE
Payer: COMMERCIAL

## 2024-08-18 ENCOUNTER — APPOINTMENT (OUTPATIENT)
Dept: RADIOLOGY | Facility: MEDICAL CENTER | Age: 74
DRG: 305 | End: 2024-08-18
Attending: PSYCHIATRY & NEUROLOGY
Payer: COMMERCIAL

## 2024-08-18 ENCOUNTER — HOSPITAL ENCOUNTER (INPATIENT)
Facility: MEDICAL CENTER | Age: 74
LOS: 3 days | DRG: 305 | End: 2024-08-21
Attending: EMERGENCY MEDICINE | Admitting: INTERNAL MEDICINE
Payer: COMMERCIAL

## 2024-08-18 DIAGNOSIS — I16.1 HYPERTENSIVE EMERGENCY: ICD-10-CM

## 2024-08-18 DIAGNOSIS — R17 ELEVATED BILIRUBIN: ICD-10-CM

## 2024-08-18 PROBLEM — I10 ACCELERATED HYPERTENSION: Status: ACTIVE | Noted: 2024-08-18

## 2024-08-18 PROBLEM — I48.92 ATRIAL FLUTTER (HCC): Status: ACTIVE | Noted: 2024-08-18

## 2024-08-18 LAB
ABO + RH BLD: NORMAL
ABO GROUP BLD: NORMAL
ALBUMIN SERPL BCP-MCNC: 4.2 G/DL (ref 3.2–4.9)
ALBUMIN/GLOB SERPL: 1.7 G/DL
ALP SERPL-CCNC: 84 U/L (ref 30–99)
ALT SERPL-CCNC: 17 U/L (ref 2–50)
ANION GAP SERPL CALC-SCNC: 14 MMOL/L (ref 7–16)
APTT PPP: 28.3 SEC (ref 24.7–36)
AST SERPL-CCNC: 19 U/L (ref 12–45)
BASOPHILS # BLD AUTO: 0.8 % (ref 0–1.8)
BASOPHILS # BLD: 0.05 K/UL (ref 0–0.12)
BILIRUB SERPL-MCNC: 3.5 MG/DL (ref 0.1–1.5)
BLD GP AB SCN SERPL QL: NORMAL
BUN SERPL-MCNC: 21 MG/DL (ref 8–22)
CALCIUM ALBUM COR SERPL-MCNC: 9.2 MG/DL (ref 8.5–10.5)
CALCIUM SERPL-MCNC: 9.4 MG/DL (ref 8.5–10.5)
CHLORIDE SERPL-SCNC: 103 MMOL/L (ref 96–112)
CO2 SERPL-SCNC: 23 MMOL/L (ref 20–33)
CREAT SERPL-MCNC: 0.98 MG/DL (ref 0.5–1.4)
EKG IMPRESSION: NORMAL
EKG IMPRESSION: NORMAL
EOSINOPHIL # BLD AUTO: 0.06 K/UL (ref 0–0.51)
EOSINOPHIL NFR BLD: 0.9 % (ref 0–6.9)
ERYTHROCYTE [DISTWIDTH] IN BLOOD BY AUTOMATED COUNT: 46.3 FL (ref 35.9–50)
GFR SERPLBLD CREATININE-BSD FMLA CKD-EPI: 80 ML/MIN/1.73 M 2
GLOBULIN SER CALC-MCNC: 2.5 G/DL (ref 1.9–3.5)
GLUCOSE SERPL-MCNC: 174 MG/DL (ref 65–99)
HCT VFR BLD AUTO: 50.3 % (ref 42–52)
HGB BLD-MCNC: 17.2 G/DL (ref 14–18)
IMM GRANULOCYTES # BLD AUTO: 0.02 K/UL (ref 0–0.11)
IMM GRANULOCYTES NFR BLD AUTO: 0.3 % (ref 0–0.9)
INR PPP: 0.97 (ref 0.87–1.13)
LYMPHOCYTES # BLD AUTO: 1.4 K/UL (ref 1–4.8)
LYMPHOCYTES NFR BLD: 21.1 % (ref 22–41)
MCH RBC QN AUTO: 31.4 PG (ref 27–33)
MCHC RBC AUTO-ENTMCNC: 34.2 G/DL (ref 32.3–36.5)
MCV RBC AUTO: 92 FL (ref 81.4–97.8)
MONOCYTES # BLD AUTO: 0.68 K/UL (ref 0–0.85)
MONOCYTES NFR BLD AUTO: 10.3 % (ref 0–13.4)
NEUTROPHILS # BLD AUTO: 4.41 K/UL (ref 1.82–7.42)
NEUTROPHILS NFR BLD: 66.6 % (ref 44–72)
NRBC # BLD AUTO: 0 K/UL
NRBC BLD-RTO: 0 /100 WBC (ref 0–0.2)
PLATELET # BLD AUTO: 201 K/UL (ref 164–446)
PMV BLD AUTO: 9.9 FL (ref 9–12.9)
POTASSIUM SERPL-SCNC: 3.8 MMOL/L (ref 3.6–5.5)
PROT SERPL-MCNC: 6.7 G/DL (ref 6–8.2)
PROTHROMBIN TIME: 13 SEC (ref 12–14.6)
RBC # BLD AUTO: 5.47 M/UL (ref 4.7–6.1)
RH BLD: NORMAL
SODIUM SERPL-SCNC: 140 MMOL/L (ref 135–145)
TROPONIN T SERPL-MCNC: 27 NG/L (ref 6–19)
WBC # BLD AUTO: 6.6 K/UL (ref 4.8–10.8)

## 2024-08-18 PROCEDURE — 0042T CT-CEREBRAL PERFUSION ANALYSIS: CPT

## 2024-08-18 PROCEDURE — 85730 THROMBOPLASTIN TIME PARTIAL: CPT

## 2024-08-18 PROCEDURE — 99291 CRITICAL CARE FIRST HOUR: CPT | Performed by: INTERNAL MEDICINE

## 2024-08-18 PROCEDURE — 84484 ASSAY OF TROPONIN QUANT: CPT

## 2024-08-18 PROCEDURE — 99222 1ST HOSP IP/OBS MODERATE 55: CPT | Performed by: PSYCHIATRY & NEUROLOGY

## 2024-08-18 PROCEDURE — 700101 HCHG RX REV CODE 250: Performed by: INTERNAL MEDICINE

## 2024-08-18 PROCEDURE — 99291 CRITICAL CARE FIRST HOUR: CPT

## 2024-08-18 PROCEDURE — 70450 CT HEAD/BRAIN W/O DYE: CPT

## 2024-08-18 PROCEDURE — 700105 HCHG RX REV CODE 258: Performed by: EMERGENCY MEDICINE

## 2024-08-18 PROCEDURE — 770022 HCHG ROOM/CARE - ICU (200)

## 2024-08-18 PROCEDURE — A9270 NON-COVERED ITEM OR SERVICE: HCPCS | Performed by: INTERNAL MEDICINE

## 2024-08-18 PROCEDURE — 70496 CT ANGIOGRAPHY HEAD: CPT

## 2024-08-18 PROCEDURE — 86901 BLOOD TYPING SEROLOGIC RH(D): CPT

## 2024-08-18 PROCEDURE — 70498 CT ANGIOGRAPHY NECK: CPT

## 2024-08-18 PROCEDURE — 85025 COMPLETE CBC W/AUTO DIFF WBC: CPT

## 2024-08-18 PROCEDURE — 700117 HCHG RX CONTRAST REV CODE 255

## 2024-08-18 PROCEDURE — 93005 ELECTROCARDIOGRAM TRACING: CPT | Performed by: EMERGENCY MEDICINE

## 2024-08-18 PROCEDURE — 700105 HCHG RX REV CODE 258: Performed by: INTERNAL MEDICINE

## 2024-08-18 PROCEDURE — 80053 COMPREHEN METABOLIC PANEL: CPT

## 2024-08-18 PROCEDURE — 86850 RBC ANTIBODY SCREEN: CPT

## 2024-08-18 PROCEDURE — 700117 HCHG RX CONTRAST REV CODE 255: Performed by: EMERGENCY MEDICINE

## 2024-08-18 PROCEDURE — 71045 X-RAY EXAM CHEST 1 VIEW: CPT

## 2024-08-18 PROCEDURE — 96365 THER/PROPH/DIAG IV INF INIT: CPT

## 2024-08-18 PROCEDURE — 86900 BLOOD TYPING SEROLOGIC ABO: CPT

## 2024-08-18 PROCEDURE — 36415 COLL VENOUS BLD VENIPUNCTURE: CPT

## 2024-08-18 PROCEDURE — 94760 N-INVAS EAR/PLS OXIMETRY 1: CPT

## 2024-08-18 PROCEDURE — 700102 HCHG RX REV CODE 250 W/ 637 OVERRIDE(OP): Performed by: INTERNAL MEDICINE

## 2024-08-18 PROCEDURE — 700101 HCHG RX REV CODE 250: Performed by: EMERGENCY MEDICINE

## 2024-08-18 PROCEDURE — 85610 PROTHROMBIN TIME: CPT

## 2024-08-18 RX ORDER — BACLOFEN 10 MG/1
10 TABLET ORAL 3 TIMES DAILY
COMMUNITY

## 2024-08-18 RX ORDER — LOSARTAN POTASSIUM 50 MG/1
50 TABLET ORAL DAILY
COMMUNITY

## 2024-08-18 RX ORDER — SODIUM CHLORIDE, SODIUM LACTATE, POTASSIUM CHLORIDE, CALCIUM CHLORIDE 600; 310; 30; 20 MG/100ML; MG/100ML; MG/100ML; MG/100ML
INJECTION, SOLUTION INTRAVENOUS CONTINUOUS
Status: DISCONTINUED | OUTPATIENT
Start: 2024-08-18 | End: 2024-08-20

## 2024-08-18 RX ORDER — METOPROLOL TARTRATE 25 MG/1
50 TABLET, FILM COATED ORAL 2 TIMES DAILY
Status: DISCONTINUED | OUTPATIENT
Start: 2024-08-18 | End: 2024-08-21 | Stop reason: HOSPADM

## 2024-08-18 RX ADMIN — APIXABAN 5 MG: 5 TABLET, FILM COATED ORAL at 23:10

## 2024-08-18 RX ADMIN — SODIUM CHLORIDE, POTASSIUM CHLORIDE, SODIUM LACTATE AND CALCIUM CHLORIDE: 600; 310; 30; 20 INJECTION, SOLUTION INTRAVENOUS at 21:45

## 2024-08-18 RX ADMIN — SODIUM CHLORIDE 10 MG/HR: 9 INJECTION, SOLUTION INTRAVENOUS at 20:35

## 2024-08-18 RX ADMIN — IOHEXOL 80 ML: 350 INJECTION, SOLUTION INTRAVENOUS at 20:21

## 2024-08-18 RX ADMIN — IOHEXOL 40 ML: 350 INJECTION, SOLUTION INTRAVENOUS at 20:23

## 2024-08-18 RX ADMIN — SODIUM CHLORIDE 10 MG/HR: 9 INJECTION, SOLUTION INTRAVENOUS at 22:23

## 2024-08-18 ASSESSMENT — CHA2DS2 SCORE
AGE 75 OR GREATER: YES
SEX: MALE
CHF OR LEFT VENTRICULAR DYSFUNCTION: NO
HYPERTENSION: YES
AGE 65 TO 74: NO
DIABETES: YES
PRIOR STROKE OR TIA OR THROMBOEMBOLISM: NO
VASCULAR DISEASE: YES
CHA2DS2 VASC SCORE: 5

## 2024-08-18 ASSESSMENT — ENCOUNTER SYMPTOMS
CARDIOVASCULAR NEGATIVE: 1
PSYCHIATRIC NEGATIVE: 1
GASTROINTESTINAL NEGATIVE: 1
RESPIRATORY NEGATIVE: 1
SPEECH CHANGE: 1

## 2024-08-18 ASSESSMENT — FIBROSIS 4 INDEX: FIB4 SCORE: 1.5

## 2024-08-18 ASSESSMENT — PAIN DESCRIPTION - PAIN TYPE
TYPE: ACUTE PAIN
TYPE: ACUTE PAIN

## 2024-08-19 PROBLEM — I16.0 HYPERTENSIVE URGENCY: Status: ACTIVE | Noted: 2024-08-18

## 2024-08-19 PROBLEM — E78.5 HYPERLIPIDEMIA: Status: ACTIVE | Noted: 2024-08-19

## 2024-08-19 PROBLEM — R17 ELEVATED BILIRUBIN: Status: ACTIVE | Noted: 2024-08-19

## 2024-08-19 LAB
ALBUMIN SERPL BCP-MCNC: 4 G/DL (ref 3.2–4.9)
ALBUMIN/GLOB SERPL: 1.9 G/DL
ALP SERPL-CCNC: 79 U/L (ref 30–99)
ALT SERPL-CCNC: 19 U/L (ref 2–50)
ANION GAP SERPL CALC-SCNC: 14 MMOL/L (ref 7–16)
AST SERPL-CCNC: 22 U/L (ref 12–45)
BASOPHILS # BLD AUTO: 0.6 % (ref 0–1.8)
BASOPHILS # BLD: 0.04 K/UL (ref 0–0.12)
BILIRUB SERPL-MCNC: 3 MG/DL (ref 0.1–1.5)
BUN SERPL-MCNC: 16 MG/DL (ref 8–22)
CALCIUM ALBUM COR SERPL-MCNC: 8.9 MG/DL (ref 8.5–10.5)
CALCIUM SERPL-MCNC: 8.9 MG/DL (ref 8.5–10.5)
CHLORIDE SERPL-SCNC: 103 MMOL/L (ref 96–112)
CHOLEST SERPL-MCNC: 207 MG/DL (ref 100–199)
CO2 SERPL-SCNC: 23 MMOL/L (ref 20–33)
CREAT SERPL-MCNC: 0.76 MG/DL (ref 0.5–1.4)
EOSINOPHIL # BLD AUTO: 0.03 K/UL (ref 0–0.51)
EOSINOPHIL NFR BLD: 0.4 % (ref 0–6.9)
ERYTHROCYTE [DISTWIDTH] IN BLOOD BY AUTOMATED COUNT: 46.5 FL (ref 35.9–50)
EST. AVERAGE GLUCOSE BLD GHB EST-MCNC: 128 MG/DL
GFR SERPLBLD CREATININE-BSD FMLA CKD-EPI: 93 ML/MIN/1.73 M 2
GLOBULIN SER CALC-MCNC: 2.1 G/DL (ref 1.9–3.5)
GLUCOSE BLD STRIP.AUTO-MCNC: 142 MG/DL (ref 65–99)
GLUCOSE SERPL-MCNC: 130 MG/DL (ref 65–99)
HBA1C MFR BLD: 6.1 % (ref 4–5.6)
HCT VFR BLD AUTO: 47.3 % (ref 42–52)
HDLC SERPL-MCNC: 43 MG/DL
HGB BLD-MCNC: 16.1 G/DL (ref 14–18)
IMM GRANULOCYTES # BLD AUTO: 0.03 K/UL (ref 0–0.11)
IMM GRANULOCYTES NFR BLD AUTO: 0.4 % (ref 0–0.9)
LDLC SERPL CALC-MCNC: 137 MG/DL
LYMPHOCYTES # BLD AUTO: 1.29 K/UL (ref 1–4.8)
LYMPHOCYTES NFR BLD: 18.2 % (ref 22–41)
MAGNESIUM SERPL-MCNC: 1.9 MG/DL (ref 1.5–2.5)
MCH RBC QN AUTO: 31.4 PG (ref 27–33)
MCHC RBC AUTO-ENTMCNC: 34 G/DL (ref 32.3–36.5)
MCV RBC AUTO: 92.2 FL (ref 81.4–97.8)
MONOCYTES # BLD AUTO: 0.69 K/UL (ref 0–0.85)
MONOCYTES NFR BLD AUTO: 9.7 % (ref 0–13.4)
NEUTROPHILS # BLD AUTO: 5.02 K/UL (ref 1.82–7.42)
NEUTROPHILS NFR BLD: 70.7 % (ref 44–72)
NRBC # BLD AUTO: 0 K/UL
NRBC BLD-RTO: 0 /100 WBC (ref 0–0.2)
PHOSPHATE SERPL-MCNC: 2.3 MG/DL (ref 2.5–4.5)
PLATELET # BLD AUTO: 210 K/UL (ref 164–446)
PMV BLD AUTO: 10.1 FL (ref 9–12.9)
POTASSIUM SERPL-SCNC: 3.6 MMOL/L (ref 3.6–5.5)
PROT SERPL-MCNC: 6.1 G/DL (ref 6–8.2)
RBC # BLD AUTO: 5.13 M/UL (ref 4.7–6.1)
SODIUM SERPL-SCNC: 140 MMOL/L (ref 135–145)
TRIGL SERPL-MCNC: 137 MG/DL (ref 0–149)
WBC # BLD AUTO: 7.1 K/UL (ref 4.8–10.8)

## 2024-08-19 PROCEDURE — 700102 HCHG RX REV CODE 250 W/ 637 OVERRIDE(OP)

## 2024-08-19 PROCEDURE — A9270 NON-COVERED ITEM OR SERVICE: HCPCS | Performed by: INTERNAL MEDICINE

## 2024-08-19 PROCEDURE — 99222 1ST HOSP IP/OBS MODERATE 55: CPT | Performed by: INTERNAL MEDICINE

## 2024-08-19 PROCEDURE — 700111 HCHG RX REV CODE 636 W/ 250 OVERRIDE (IP): Mod: JZ

## 2024-08-19 PROCEDURE — 700111 HCHG RX REV CODE 636 W/ 250 OVERRIDE (IP): Performed by: STUDENT IN AN ORGANIZED HEALTH CARE EDUCATION/TRAINING PROGRAM

## 2024-08-19 PROCEDURE — 85025 COMPLETE CBC W/AUTO DIFF WBC: CPT

## 2024-08-19 PROCEDURE — 83735 ASSAY OF MAGNESIUM: CPT

## 2024-08-19 PROCEDURE — 83036 HEMOGLOBIN GLYCOSYLATED A1C: CPT

## 2024-08-19 PROCEDURE — 700102 HCHG RX REV CODE 250 W/ 637 OVERRIDE(OP): Performed by: INTERNAL MEDICINE

## 2024-08-19 PROCEDURE — 99232 SBSQ HOSP IP/OBS MODERATE 35: CPT | Performed by: PSYCHIATRY & NEUROLOGY

## 2024-08-19 PROCEDURE — A9270 NON-COVERED ITEM OR SERVICE: HCPCS

## 2024-08-19 PROCEDURE — 84100 ASSAY OF PHOSPHORUS: CPT

## 2024-08-19 PROCEDURE — 80053 COMPREHEN METABOLIC PANEL: CPT

## 2024-08-19 PROCEDURE — 80061 LIPID PANEL: CPT

## 2024-08-19 PROCEDURE — 770020 HCHG ROOM/CARE - TELE (206)

## 2024-08-19 PROCEDURE — 82962 GLUCOSE BLOOD TEST: CPT

## 2024-08-19 PROCEDURE — 700102 HCHG RX REV CODE 250 W/ 637 OVERRIDE(OP): Mod: JZ | Performed by: STUDENT IN AN ORGANIZED HEALTH CARE EDUCATION/TRAINING PROGRAM

## 2024-08-19 PROCEDURE — A9270 NON-COVERED ITEM OR SERVICE: HCPCS | Mod: JZ | Performed by: STUDENT IN AN ORGANIZED HEALTH CARE EDUCATION/TRAINING PROGRAM

## 2024-08-19 PROCEDURE — 99291 CRITICAL CARE FIRST HOUR: CPT | Performed by: STUDENT IN AN ORGANIZED HEALTH CARE EDUCATION/TRAINING PROGRAM

## 2024-08-19 RX ORDER — LOSARTAN POTASSIUM 50 MG/1
50 TABLET ORAL
Status: DISCONTINUED | OUTPATIENT
Start: 2024-08-19 | End: 2024-08-21 | Stop reason: HOSPADM

## 2024-08-19 RX ORDER — ECHINACEA PURPUREA EXTRACT 125 MG
2 TABLET ORAL
Status: DISCONTINUED | OUTPATIENT
Start: 2024-08-19 | End: 2024-08-21 | Stop reason: HOSPADM

## 2024-08-19 RX ORDER — MAGNESIUM SULFATE HEPTAHYDRATE 40 MG/ML
2 INJECTION, SOLUTION INTRAVENOUS ONCE
Status: COMPLETED | OUTPATIENT
Start: 2024-08-19 | End: 2024-08-19

## 2024-08-19 RX ORDER — DEXTROSE MONOHYDRATE 25 G/50ML
25 INJECTION, SOLUTION INTRAVENOUS
Status: DISCONTINUED | OUTPATIENT
Start: 2024-08-19 | End: 2024-08-21 | Stop reason: HOSPADM

## 2024-08-19 RX ORDER — POTASSIUM CHLORIDE 1500 MG/1
40 TABLET, EXTENDED RELEASE ORAL ONCE
Status: COMPLETED | OUTPATIENT
Start: 2024-08-19 | End: 2024-08-19

## 2024-08-19 RX ORDER — HYDRALAZINE HYDROCHLORIDE 20 MG/ML
10 INJECTION INTRAMUSCULAR; INTRAVENOUS EVERY 6 HOURS PRN
Status: DISCONTINUED | OUTPATIENT
Start: 2024-08-19 | End: 2024-08-21 | Stop reason: HOSPADM

## 2024-08-19 RX ORDER — ACETAMINOPHEN 325 MG/1
650 TABLET ORAL EVERY 6 HOURS PRN
Status: DISCONTINUED | OUTPATIENT
Start: 2024-08-19 | End: 2024-08-21 | Stop reason: HOSPADM

## 2024-08-19 RX ADMIN — POTASSIUM CHLORIDE 40 MEQ: 1500 TABLET, EXTENDED RELEASE ORAL at 10:51

## 2024-08-19 RX ADMIN — LOSARTAN POTASSIUM 50 MG: 50 TABLET, FILM COATED ORAL at 07:54

## 2024-08-19 RX ADMIN — ACETAMINOPHEN 650 MG: 325 TABLET ORAL at 02:55

## 2024-08-19 RX ADMIN — MAGNESIUM SULFATE HEPTAHYDRATE 2 G: 2 INJECTION, SOLUTION INTRAVENOUS at 10:55

## 2024-08-19 RX ADMIN — HYDRALAZINE HYDROCHLORIDE 10 MG: 20 INJECTION INTRAMUSCULAR; INTRAVENOUS at 19:11

## 2024-08-19 RX ADMIN — DIBASIC SODIUM PHOSPHATE, MONOBASIC POTASSIUM PHOSPHATE AND MONOBASIC SODIUM PHOSPHATE 500 MG: 852; 155; 130 TABLET ORAL at 17:32

## 2024-08-19 RX ADMIN — APIXABAN 5 MG: 5 TABLET, FILM COATED ORAL at 17:32

## 2024-08-19 RX ADMIN — METOPROLOL TARTRATE 50 MG: 50 TABLET, FILM COATED ORAL at 17:32

## 2024-08-19 RX ADMIN — APIXABAN 5 MG: 5 TABLET, FILM COATED ORAL at 05:00

## 2024-08-19 RX ADMIN — DIBASIC SODIUM PHOSPHATE, MONOBASIC POTASSIUM PHOSPHATE AND MONOBASIC SODIUM PHOSPHATE 500 MG: 852; 155; 130 TABLET ORAL at 10:51

## 2024-08-19 RX ADMIN — METOPROLOL TARTRATE 50 MG: 50 TABLET, FILM COATED ORAL at 09:38

## 2024-08-19 SDOH — ECONOMIC STABILITY: TRANSPORTATION INSECURITY
IN THE PAST 12 MONTHS, HAS LACK OF RELIABLE TRANSPORTATION KEPT YOU FROM MEDICAL APPOINTMENTS, MEETINGS, WORK OR FROM GETTING THINGS NEEDED FOR DAILY LIVING?: NO

## 2024-08-19 SDOH — ECONOMIC STABILITY: TRANSPORTATION INSECURITY
IN THE PAST 12 MONTHS, HAS THE LACK OF TRANSPORTATION KEPT YOU FROM MEDICAL APPOINTMENTS OR FROM GETTING MEDICATIONS?: NO

## 2024-08-19 ASSESSMENT — ENCOUNTER SYMPTOMS
ABDOMINAL PAIN: 0
SHORTNESS OF BREATH: 0
DOUBLE VISION: 0
BACK PAIN: 0
TREMORS: 0
CONSTIPATION: 0
DIZZINESS: 0
VOMITING: 0
WEIGHT LOSS: 0
FEVER: 0
NECK PAIN: 0
SPUTUM PRODUCTION: 0
TINGLING: 0
PHOTOPHOBIA: 0
WEAKNESS: 0
SENSORY CHANGE: 0
FALLS: 0
PALPITATIONS: 0
BLURRED VISION: 0
DIARRHEA: 0
HEADACHES: 0
EYE PAIN: 0
HALLUCINATIONS: 0
COUGH: 0
ROS GI COMMENTS: INTERMITTENT NAUSEA
CHILLS: 0
NAUSEA: 0
SPEECH CHANGE: 0
FOCAL WEAKNESS: 0
MYALGIAS: 0
ORTHOPNEA: 0

## 2024-08-19 ASSESSMENT — COGNITIVE AND FUNCTIONAL STATUS - GENERAL
SUGGESTED CMS G CODE MODIFIER DAILY ACTIVITY: CJ
CLIMB 3 TO 5 STEPS WITH RAILING: A LITTLE
WALKING IN HOSPITAL ROOM: A LITTLE
PERSONAL GROOMING: A LITTLE
HELP NEEDED FOR BATHING: A LITTLE
SUGGESTED CMS G CODE MODIFIER MOBILITY: CJ
DAILY ACTIVITIY SCORE: 21
TURNING FROM BACK TO SIDE WHILE IN FLAT BAD: A LITTLE
MOBILITY SCORE: 21
DRESSING REGULAR LOWER BODY CLOTHING: A LITTLE

## 2024-08-19 ASSESSMENT — PAIN DESCRIPTION - PAIN TYPE
TYPE: ACUTE PAIN

## 2024-08-19 ASSESSMENT — LIFESTYLE VARIABLES
EVER HAD A DRINK FIRST THING IN THE MORNING TO STEADY YOUR NERVES TO GET RID OF A HANGOVER: NO
AVERAGE NUMBER OF DAYS PER WEEK YOU HAVE A DRINK CONTAINING ALCOHOL: 0
TOTAL SCORE: 0
TOTAL SCORE: 0
SUBSTANCE_ABUSE: 0
ON A TYPICAL DAY WHEN YOU DRINK ALCOHOL HOW MANY DRINKS DO YOU HAVE: 0
CONSUMPTION TOTAL: NEGATIVE
HOW MANY TIMES IN THE PAST YEAR HAVE YOU HAD 5 OR MORE DRINKS IN A DAY: 0
ALCOHOL_USE: NO
DOES PATIENT WANT TO STOP DRINKING: NO
TOTAL SCORE: 0
HAVE PEOPLE ANNOYED YOU BY CRITICIZING YOUR DRINKING: NO
EVER FELT BAD OR GUILTY ABOUT YOUR DRINKING: NO
HAVE YOU EVER FELT YOU SHOULD CUT DOWN ON YOUR DRINKING: NO

## 2024-08-19 ASSESSMENT — SOCIAL DETERMINANTS OF HEALTH (SDOH)
WITHIN THE LAST YEAR, HAVE TO BEEN RAPED OR FORCED TO HAVE ANY KIND OF SEXUAL ACTIVITY BY YOUR PARTNER OR EX-PARTNER?: NO
IN THE PAST 12 MONTHS, HAS THE ELECTRIC, GAS, OIL, OR WATER COMPANY THREATENED TO SHUT OFF SERVICE IN YOUR HOME?: NO
WITHIN THE PAST 12 MONTHS, THE FOOD YOU BOUGHT JUST DIDN'T LAST AND YOU DIDN'T HAVE MONEY TO GET MORE: NEVER TRUE
WITHIN THE PAST 12 MONTHS, YOU WORRIED THAT YOUR FOOD WOULD RUN OUT BEFORE YOU GOT THE MONEY TO BUY MORE: NEVER TRUE
WITHIN THE LAST YEAR, HAVE YOU BEEN AFRAID OF YOUR PARTNER OR EX-PARTNER?: NO
WITHIN THE LAST YEAR, HAVE YOU BEEN KICKED, HIT, SLAPPED, OR OTHERWISE PHYSICALLY HURT BY YOUR PARTNER OR EX-PARTNER?: NO
WITHIN THE LAST YEAR, HAVE YOU BEEN HUMILIATED OR EMOTIONALLY ABUSED IN OTHER WAYS BY YOUR PARTNER OR EX-PARTNER?: NO

## 2024-08-19 ASSESSMENT — PATIENT HEALTH QUESTIONNAIRE - PHQ9
SUM OF ALL RESPONSES TO PHQ9 QUESTIONS 1 AND 2: 0
2. FEELING DOWN, DEPRESSED, IRRITABLE, OR HOPELESS: NOT AT ALL
1. LITTLE INTEREST OR PLEASURE IN DOING THINGS: NOT AT ALL

## 2024-08-19 ASSESSMENT — FIBROSIS 4 INDEX: FIB4 SCORE: 1.8

## 2024-08-19 NOTE — PROGRESS NOTES
MONITOR SUMMARY  Rate:60-80  Rhythm: A flutter with BBB/ partial paced  Ectopy:  Measurements:  0.157/0.499

## 2024-08-19 NOTE — PROGRESS NOTES
"Critical Care Progress Note    Date of admission  8/18/2024    Chief Complaint/Hospital Course  \"73 y.o. male with a history of atrial fibrillation on Eliquis, hypertension, diabetes mellitus and hyperlipidemia who presented 8/18/2024 with slurred speech that was first noted at midnight last night.  His symptoms continued throughout the day until EMS was contacted this evening.  When EMS arrived they noted his systolic blood pressure was in the 200s.  Fingerstick blood glucose was 168.  That he had difficulty speaking but no other neurologic symptoms or weakness.  He was transported to [facility] emergency department where stroke alert was activated, by that time his slurred speech had improved but not resolved completely.  CT and CT perfusion reveals no acute intracranial abnormalities or large vessel occlusions.  Thrombolytic therapy was not recommended.  Nicardipine infusion was initiated for his hypertension.  Critical care consultation was requested for further evaluation and management. \" Dr. Kulkarni    8/19: Off nicardipine gtt, transitioning to home BP meds. Dysarthria resolved. He reports that his swallowing feels different and has been like this for about the last month. No neural deficits on exam. He reports that he has been using afrin consistently for years to treat nasal congestion.    Interval Problem Update  Benign cranial nerve exam. Discussed his home afrin use and instructed him to discontinue this as it may be contributing to his hypertension.    Cardiac: HR 60s, /80s on nicardipine gtt  Pulm: RA  Heme: wnl  /renal: wnl  GI/endo: NPO  ID:  NA   I/O: -800cc  Additional Labs/Imaging:   - tbili 3 - hx elevated bili 1.8-2, had prior ERCP 2012 w/biliary sphincterotomy and CBD stone extraction    Review of Systems  Review of Systems   Constitutional:  Negative for chills, fever and malaise/fatigue.   Respiratory:  Negative for cough and shortness of breath.    Cardiovascular:  " Negative for chest pain.   Gastrointestinal:  Negative for diarrhea and vomiting.        Intermittent nausea   Musculoskeletal:  Negative for falls.   Neurological:  Negative for dizziness, tingling, speech change, focal weakness, weakness and headaches.        Vital Signs for last 24 hours   Temp:  [35.7 °C (96.3 °F)-36.8 °C (98.2 °F)] 36.5 °C (97.7 °F)  Pulse:  [69-87] 69  Resp:  [13-48] 20  BP: (127-247)/() 168/103  SpO2:  [87 %-96 %] 94 %    Hemodynamic parameters for last 24 hours       Respiratory Information for the last 24 hours       Physical Exam   Physical Exam  Vitals and nursing note reviewed.   Constitutional:       General: He is not in acute distress.     Appearance: He is not ill-appearing.   HENT:      Head: Normocephalic.      Mouth/Throat:      Mouth: Mucous membranes are moist.   Eyes:      Extraocular Movements: Extraocular movements intact.      Conjunctiva/sclera: Conjunctivae normal.   Cardiovascular:      Rate and Rhythm: Normal rate. Rhythm irregular.      Heart sounds: No murmur heard.     No gallop. No S3 or S4 sounds.   Pulmonary:      Effort: Pulmonary effort is normal. No respiratory distress.   Abdominal:      General: There is no distension.      Palpations: Abdomen is soft.      Tenderness: There is no abdominal tenderness.   Musculoskeletal:         General: No deformity.   Skin:     General: Skin is warm and dry.   Neurological:      Mental Status: He is alert. Mental status is at baseline.      Cranial Nerves: Cranial nerves 2-12 are intact.      Sensory: Sensation is intact.      Motor: Motor function is intact.      Coordination: Finger-Nose-Finger Test normal.   Psychiatric:         Mood and Affect: Mood normal.         Speech: Speech normal.         Behavior: Behavior normal. Behavior is cooperative.         Medications  Current Facility-Administered Medications   Medication Dose Route Frequency Provider Last Rate Last Admin    acetaminophen (Tylenol) tablet 650 mg   650 mg Oral Q6HRS PRN Hyun Mackey M.D.   650 mg at 08/19/24 0255    losartan (Cozaar) tablet 50 mg  50 mg Oral Q DAY My Cardona M.D.   50 mg at 08/19/24 0754    potassium chloride SA (Kdur) tablet 40 mEq  40 mEq Oral Once My Cardona M.D.        phosphorus (K-Phos-Neutral) per tablet 500 mg  500 mg Oral BID My Cardona M.D.        magnesium sulfate IVPB premix 2 g  2 g Intravenous Once My Cardona M.D.        lactated ringers infusion   Intravenous Continuous Rehan Kulkarni M.D. 75 mL/hr at 08/19/24 0725 Rate Verify at 08/19/24 0725    metoprolol tartrate (Lopressor) tablet 50 mg  50 mg Oral BID Rehan Kulkarni M.D.        apixaban (Eliquis) tablet 5 mg  5 mg Oral BID Rehan Kulkarni M.D.   5 mg at 08/19/24 0500       Fluids    Intake/Output Summary (Last 24 hours) at 8/19/2024 0938  Last data filed at 8/19/2024 0725  Gross per 24 hour   Intake 829.48 ml   Output 1075 ml   Net -245.52 ml       Laboratory          Recent Labs     08/18/24 2004 08/19/24  0305   SODIUM 140 140   POTASSIUM 3.8 3.6   CHLORIDE 103 103   CO2 23 23   BUN 21 16   CREATININE 0.98 0.76   MAGNESIUM  --  1.9   PHOSPHORUS  --  2.3*   CALCIUM 9.4 8.9     Recent Labs     08/18/24 2004 08/19/24  0305   ALTSGPT 17 19   ASTSGOT 19 22   ALKPHOSPHAT 84 79   TBILIRUBIN 3.5* 3.0*   GLUCOSE 174* 130*     Recent Labs     08/18/24 2004 08/19/24  0014 08/19/24  0305   WBC 6.6 7.1  --    NEUTSPOLYS 66.60 70.70  --    LYMPHOCYTES 21.10* 18.20*  --    MONOCYTES 10.30 9.70  --    EOSINOPHILS 0.90 0.40  --    BASOPHILS 0.80 0.60  --    ASTSGOT 19  --  22   ALTSGPT 17  --  19   ALKPHOSPHAT 84  --  79   TBILIRUBIN 3.5*  --  3.0*     Recent Labs     08/18/24 2004 08/19/24  0014   RBC 5.47 5.13   HEMOGLOBIN 17.2 16.1   HEMATOCRIT 50.3 47.3   PLATELETCT 201 210   PROTHROMBTM 13.0  --    APTT 28.3  --    INR 0.97  --        Imaging  Reviewed     Assessment/Plan  * Hypertensive urgency- (present on admission)  Assessment &  Plan  Had systolic blood pressures in the 200s per EMS with improvement following nicardipine gtt overnight into 8/19. He reports that his pressures are usually in the 140s/80s at home.  - Discontinued nicardipine gtt overnight  - Resume home losartan 50mg QD and metoprolol tartrate 50mg BID    Elevated bilirubin  Assessment & Plan  Tbili 3 - hx elevated bili 1.8-2, had prior ERCP 2012 w/biliary sphincterotomy and CBD stone extraction  Downtrending - no abd symptoms    Atrial flutter (HCC)  Assessment & Plan  Rate control   Continue Eliquis  Continue metoprolol regimen 50 mg twice daily    DM (diabetes mellitus) (HCC)- (present on admission)  Assessment & Plan  Holding home sitagliptin - can restart once stable after starting PO diet  F/u a1c       VTE:  NOAC  Ulcer: Not Indicated  Lines: PIV x2    I have performed a physical exam and reviewed and updated ROS and Plan today (8/19/2024). In review of yesterday's note (8/18/2024), there are no changes except as documented above.     Discussed patient condition and risk of morbidity and/or mortality with RN, RT, Pharmacy, Charge nurse / hot rounds, and Patient  The patient remains critically ill.  Critical care time = 40 minutes in directly providing and coordinating critical care and extensive data review.  No time overlap and excludes procedures.    Tin Lin MD  PGY-1, [employer] [facility]

## 2024-08-19 NOTE — CARE PLAN
The patient is Watcher - Medium risk of patient condition declining or worsening    Shift Goals  Clinical Goals: maintain BP goal of 130-160  Patient Goals: go home  Family Goals: updates    Progress made toward(s) clinical / shift goals:    Problem: Knowledge Deficit - Standard  Goal: Patient and family/care givers will demonstrate understanding of plan of care, disease process/condition, diagnostic tests and medications  Outcome: Progressing  Note: Patient is alert and oriented, is forgetful at times. Educated on plan of care for shift.      Problem: Fall Risk  Goal: Patient will remain free from falls  Outcome: Progressing  Note: Patient able to ambulate with 1 person assist. Bed and chair alarms in place and functioning     Problem: Pain - Standard  Goal: Alleviation of pain or a reduction in pain to the patient’s comfort goal  Outcome: Progressing  Note: No reports of pain this shift

## 2024-08-19 NOTE — ED TRIAGE NOTES
Chief Complaint   Patient presents with    Possible Stroke     Brought by ems due to stroke like symptoms    Symptoms: + CONFUSION, SLURRED SPEECH    Last Known Well: (?)  Onset of symptoms: 0000 AM  NIH: 2  GCS: 14/15 (Alert and Oriented x 3 - person, place, situation), disoriented  x time    Contraptions: IV gauge 18 Rt AC  Interventions given: Blood Glucose en route: 168 mg/dL    Evaluated by the ERP  Sent to CT scan  Transferred to pt's room and endorsed to the assigned RN    Hypertension     Blood pressure en route: 200/120 mmHg  + Nausea and Vomiting    N/V

## 2024-08-19 NOTE — PROGRESS NOTES
4 Eyes Skin Assessment Completed by Michel RN and DERICK Nance.    Head WDL  Ears WDL  Nose WDL  Mouth WDL  Neck WDL  Breast/Chest WDL  Shoulder Blades WDL  Spine WDL  (R) Arm/Elbow/Hand Bruising  (L) Arm/Elbow/Hand Bruising  Abdomen WDL  Groin WDL  Scrotum/Coccyx/Buttocks Redness and Blanching    (R) Leg WDL  (L) Leg WDL  (R) Heel/Foot/Toe Boggy  (L) Heel/Foot/Toe Boggy          Devices In Places ECG, Blood Pressure Cuff, and Pulse Ox      Interventions In Place Pillows, Q2 Turns, Low Air Loss Mattress, and Barrier Cream, Sacral Mepilex    Possible Skin Injury No    Pictures Uploaded Into Epic Yes  Wound Consult Placed N/A  RN Wound Prevention Protocol Ordered No

## 2024-08-19 NOTE — CONSULTS
Neurology STROKE CODE H&P  Neurohospitalist Service, [facility]    Referring Physician: Andreas Sanz M.D.    STROKE CODE:   Chief Complaint   Patient presents with    Possible Stroke     Brought by ems due to stroke like symptoms    Symptoms: + CONFUSION, SLURRED SPEECH    Last Known Well: (?)  Onset of symptoms: 0000 AM  NIH: 2  GCS: 14/15 (Alert and Oriented x 3 - person, place, situation), disoriented  x time    Contraptions: IV gauge 18 Rt AC  Interventions given: Blood Glucose en route: 168 mg/dL    Evaluated by the ERP  Sent to CT scan  Transferred to pt's room and endorsed to the assigned RN    Hypertension     Blood pressure en route: 200/120 mmHg  + Nausea and Vomiting    N/V       To obtain the most accurate data regarding the time called, and time patient seen, refer to the stroke run-sheet and chart.  For time of CT, refer to the radiology report. See A&P below for TPA Decision and door to needle time if and when applicable.    HPI: Andreas Ortega is a 73 year-old man with hypertension, diabetes, hyperlipidemia, brought in by EMS as stroke pre-alert for slurred speech and confusion.   Wife reported to EMS that she first noted symptoms of slurred speech at midnight last night.  Symptoms persisted throughout the course of the day, with associated confusion.  Given this EMS eventually called this evening.  On their arrival, noted speech difficulties with no lateralizing weakness.  SBPs in 200s, HR 70s, FSBS 168.  On arrival to [facility], slurred speech appeared improved but not completely resolved.  Andreas is on apixaban therapy for atrial fibrillation.  He reports compliance with medications since his wife gives him his medications daily.  He has a statin intolerance, having tried essentially all statins.  On ROS, denies infectious prodrome or constitutional symptoms.      Review of systems: In addition to what is detailed in the HPI above, all other systems reviewed and are  negative.    Past Medical History:    has a past medical history of Arthritis, Backpain, Breath shortness (07/11/2023), Cancer (HCC), Diabetes, Fall, Heart burn (07/11/2023), High cholesterol (07/11/2023), Hypertension, Indigestion, Prostate cancer (HCC), Psychiatric problem (07/11/2023), Renal cancer (HCC), and Skin cancer.    FHx:  family history includes Cancer in his unknown relative; Diabetes in his unknown relative; Heart Disease in his unknown relative; Hypertension in his unknown relative; Stroke in his unknown relative.    SHx:   reports that he quit smoking about 40 years ago. His smoking use included cigarettes. He started smoking about 58 years ago. He has a 36 pack-year smoking history. He has been exposed to tobacco smoke. He quit smokeless tobacco use about 30 years ago.  His smokeless tobacco use included chew and snuff. He reports that he does not drink alcohol and does not use drugs.    Allergies:  Allergies   Allergen Reactions    Pcn [Penicillins] Anaphylaxis     As a child, throat swelled     Levofloxacin Nausea     Nausea, flushed and headache    Metformin Nausea     Nausea, flushed and headache    Pravastatin Nausea     Nausea,flushed and headache    Rosuvastatin Nausea     Flushed, headache      Simvastatin Nausea     Nausea, flushed and headache    Statins [Hmg-Coa-R Inhibitors] Nausea     Nausea, flushed and headache       Medications:    Current Facility-Administered Medications:     niCARdipine (Cardene) 25 mg in  mL Standard Infusion, 0-15 mg/hr, Intravenous, Continuous, Andreas Sanz M.D., Last Rate: 100 mL/hr at 08/18/24 2035, 10 mg/hr at 08/18/24 2035    Current Outpatient Medications:     apixaban (ELIQUIS) 5mg Tab, Take 1 Tablet by mouth 2 times a day., Disp: 60 Tablet, Rfl: 2    metoprolol tartrate (LOPRESSOR) 50 MG Tab, Take 1 Tablet by mouth 2 times a day., Disp: 180 Tablet, Rfl: 3    Alogliptin Benzoate 25 MG Tab, Take 25 mg by mouth every evening., Disp: , Rfl:      "pregabalin (LYRICA) 150 MG Cap, Take 300 mg by mouth every evening., Disp: , Rfl:     oxyCODONE immediate release (ROXICODONE) 10 MG immediate release tablet, Take 10 mg by mouth every 6 hours as needed (for pain)., Disp: , Rfl:     vitamin D2, Ergocalciferol, (DRISDOL) 1.25 MG (78852 UT) Cap capsule, Take 50,000 Units by mouth every 7 days. Sunday, Disp: , Rfl:     allopurinol (ZYLOPRIM) 300 MG Tab, Take 150 mg by mouth every day., Disp: , Rfl:     Cyanocobalamin (VITAMIN B-12) 1000 MCG Tab, Take 2,000 mcg by mouth every day., Disp: , Rfl:     hydroCHLOROthiazide (HYDRODIURIL) 12.5 MG tablet, Take 12.5 mg by mouth every morning., Disp: , Rfl:     Physical Examination:    Vitals:    08/18/24 2021 08/18/24 2026 08/18/24 2030 08/18/24 2038   BP:  (!) 135/108 (!) 247/116 (!) 196/104   Pulse:  70 69 69   Resp:  14 15    Temp:  36.8 °C (98.2 °F)     TempSrc:  Temporal     SpO2:  95% 95% 94%   Weight: 109 kg (240 lb)      Height: 1.88 m (6' 2\")            General: Patient is awake and in no acute distress  Eye: Examination of optic disks not indicated at this time given acuity of consult  Neck: There is normal range of motion  CV: Regular rate   Extremities:  Clear, dry, intact, without peripheral edema    NEUROLOGICAL EXAM:     Mental status: Awake, alert   Speech and language: Speech is mildly dysarthric and fluent. The patient is able to name and repeat, and follow commands.  He is not able to tell me the year, unclear baseline.  Cranial nerve exam:  Visual fields are full. There is no nystagmus. Extraocular muscles are intact. Face is symmetric.   Motor exam: There is sustained antigravity with no downward drift in bilateral arms and legs.    Sensory exam:  Reacts to tactile in all 4 distal extremities, there is no neglect to double stim.  Coordination: No ataxia on bilateral finger-to-nose testing.  Gait: Deferred due to patient preference.    NIHSS: National Institutes of Health Stroke Scale    [0] 1a:Level of " Consciousness    0-alert 1-drowsy   2-stupor   3-coma  [1] 1b:LOC Questions                  0-both  1-one      2-neither  [0] 1c:LOC Commands                   0-both  1-one      2-neither  [0] 2: Best Gaze                     0-nl    1-partial  2-forced  [0] 3: Visual Fields                   0-nl    1-partial  2-complete 3-bilat  [0] 4: Facial Paresis                0-nl    1-minor    2-partial  3-full  MOTOR                       0-nl  [0] 5: Right Arm           1-drift  [0] 6: Left Arm             2-some effort vs gravity  [0] 7: Right Leg           3-no effort vs gravity  [0] 8: Left Leg             4-no movement                             x-untestable  [0] 9: Limb Ataxia                    0-abs   1-1_limb   2-2+_limbs       x-untestable  [0] 10:Sensory                        0-nl    1-partial  2-dense  [0] 11:Best Language/Aphasia         0-nl    1-mild/mod 2-severe   3-mute  [1] 12:Dysarthria                     0-nl    1-mild/mod 2-severe       x-untestable  [0] 13:Neglect/Inattention            0-none  1-partial  2-complete  [2] TOTAL    Baseline Modified Cisco Scale (MRS): 2 = Slight disability; unable to perform all previous activities but able to look after own affairs without assistance    Objective Data:    Labs:  Lab Results   Component Value Date/Time    PROTHROMBTM 13.0 08/18/2024 08:04 PM    INR 0.97 08/18/2024 08:04 PM      Lab Results   Component Value Date/Time    WBC 6.6 08/18/2024 08:04 PM    RBC 5.47 08/18/2024 08:04 PM    HEMOGLOBIN 17.2 08/18/2024 08:04 PM    HEMATOCRIT 50.3 08/18/2024 08:04 PM    MCV 92.0 08/18/2024 08:04 PM    MCH 31.4 08/18/2024 08:04 PM    MCHC 34.2 08/18/2024 08:04 PM    MPV 9.9 08/18/2024 08:04 PM    NEUTSPOLYS 66.60 08/18/2024 08:04 PM    LYMPHOCYTES 21.10 (L) 08/18/2024 08:04 PM    MONOCYTES 10.30 08/18/2024 08:04 PM    EOSINOPHILS 0.90 08/18/2024 08:04 PM    BASOPHILS 0.80 08/18/2024 08:04 PM      Lab Results   Component Value Date/Time    SODIUM 140 07/13/2023  "12:28 PM    POTASSIUM 3.9 07/13/2023 12:28 PM    CHLORIDE 101 07/13/2023 12:28 PM    CO2 27 07/13/2023 12:28 PM    GLUCOSE 123 (H) 07/13/2023 12:28 PM    BUN 17 07/13/2023 12:28 PM    CREATININE 1.00 07/13/2023 12:28 PM      No results found for: \"CHOLSTRLTOT\", \"LDL\", \"HDL\", \"TRIGLYCERIDE\"    Lab Results   Component Value Date/Time    ALKPHOSPHAT 94 07/13/2023 12:28 PM    ASTSGOT 17 07/13/2023 12:28 PM    ALTSGPT 18 07/13/2023 12:28 PM    TBILIRUBIN 1.7 (H) 07/13/2023 12:28 PM        Imaging/Testing:    I interpreted and/or reviewed the patient's neuroimaging    CT-CEREBRAL PERFUSION ANALYSIS   Final Result      1. Cerebral blood flow less than 30% possibly representing completed infarct = 0 mL.   2. T Max more than 6 seconds possibly representing combination of completed infarct and ischemia = 0 mL.   3. Mismatched volume possibly representing ischemic brain/penumbra= 0 mL      4.  Please note that this cerebral perfusion study and report is Quantitative and targets supratentorial (cerebral) perfusion for evaluation of large vessel territory acute ischemia/infarction. For example, lacunar infarcts, and brainstem/posterior fossa    ischemia/infarction are not evaluated on this study.  Data acquisition is subject to artifacts which can yield non-anatomically plausible perfusion maps which may be due to motion, bolus timing, signal to noise ratio, or other technical factors.    Perfusion map abnormalities which show non-anatomic distributions are likely artifact.   This study is not \"stand-alone\" and should only be utilized for diagnosis, management/treatment in correlation with CT, CTA, and/or MRI and clinical factors.         CT-CTA HEAD WITH & W/O-POST PROCESS   Final Result      Atherosclerosis without occlusion or aneurysm.      CT-HEAD W/O   Final Result         1. Atrophy and chronic white matter disease.   2. No definite acute intracranial abnormality.               DX-CHEST-PORTABLE (1 VIEW)    (Results " Pending)   CT-CTA NECK WITH & W/O-POST PROCESSING    (Results Pending)       Assessment and Plan:  Andreas Ortega is a 73 year old man presenting with improving dysarthria over the past 16 hours or so, without lateralizing symptoms.  Stroke protocol CT with diffuse atherosclerotic burden, but no hemorrhage, no large territory stroke, no critical stenoses or vessel occlusions.  CT perfusion was a normal study.  In absence of clearly identified intracranial clot with associated ischemic penumbra, I do not recommend the use of off-label thrombolytic therapy.  He is stroke optimized on apixaban therapy. He was a PPM and unable to attain MRI, and regardless, MRI positive results for acute stroke would not change my recommended medical management for Andreas.  It is possible that his symptoms are related to hypertensive encephalopathy.  In the absence of an ischemic penumbra, he does not require an acute hypertensive response. May start BP lowering immediately.    Problem list:   Hypertensive emergency   Dysarthria   Hypertension   Hyperlipidemia   Type 2 diabetes    Recommendations:   - from a stroke perspective, q4h neurochecks/NIHSS ok   - does not require acute hypertensive response, immediate BP lowering by ~25% is ok. Long-term goal is 110-130/60-80   - continue apixaban 5mg BID   - may defer MRI brain, TTE/ziopatch as results will not change recommended medical therapies   - stroke labs:  HgbA1c and lipid panel   - statin intolerance, if needed consider PCSK9 inhibitor for LDL goal < 70   - DM management for goal HgbA1c < 7   - PT/OT/SLP   - on apixaban for DVT chemoppx    Patient discussed with Dr. Sanz, ER attending.    Venu Hernandez MD  Vascular Neurology

## 2024-08-19 NOTE — ASSESSMENT & PLAN NOTE
Tbili 3 - hx elevated bili 1.8-2, had prior ERCP 2012 w/biliary sphincterotomy and CBD stone extraction  Downtrending - no abd symptoms

## 2024-08-19 NOTE — ASSESSMENT & PLAN NOTE
Nicardipine infusion - wean as tolerated, will plan to normalize BP today with goal <160  Start losartan 50mg daily  Continue metoprolol 50mg BID  Serum metanephrines  F/u lipid panel

## 2024-08-19 NOTE — DISCHARGE PLANNING
Case Management Discharge Planning    Admission Date: 8/18/2024  GMLOS: 2.9  ALOS: 1    6-Clicks ADL Score:    6-Clicks Mobility Score:        Anticipated Discharge Dispo: Discharge Disposition: Discharged to home/self care (01)  Discharge Address: [address] [place] [address]    DME Needed: No    Action(s) Taken: DC Assessment Complete (See below)    LMSW met with pt at bedside to complete DC assessment. LMSW Introduced self and department roles. Pt A&Ox4 and able to verify the information on the face sheet. Pt lives with his  in a single-story house that has one step to enter. Pt verified address as [address], [place] [address]. [facility] Prior to this hospitalization pt was independent at home with ADLs and most IADLs. Pt does not use any DME at baseline. Pt reported that his wife is a good support for him. Pt stated that he has a son and daughter that live in [place]. Pt denies any SA or MH concerns. Pt does not have an advance directive.     Escalations Completed: None    Medically Clear: No    Next Steps: f/u with pt and medical team to discuss dc needs and barriers.       Barriers to Discharge: Medical clearance    Is the patient up for discharge tomorrow: No      Care Transition Team Assessment    Information Source  Orientation Level: Oriented X4  Information Given By: Patient  Who is responsible for making decisions for patient? : Patient    Readmission Evaluation  Is this a readmission?: No    Elopement Risk  Legal Hold: No  Ambulatory or Self Mobile in Wheelchair: No-Not an Elopement Risk  Elopement Risk: Not at Risk for Elopement    Interdisciplinary Discharge Planning  Lives with - Patient's Self Care Capacity: Spouse  Housing / Facility: [address]    Discharge Preparedness  What is your plan after discharge?: Uncertain - pending medical team collaboration  What are your discharge supports?: Spouse  Prior Functional Level: Ambulatory,  Independent with Activities of Daily Living, Independent with Medication Management  Difficulity with ADLs: None  Difficulity with IADLs: None    Functional Assesment  Prior Functional Level: Ambulatory, Independent with Activities of Daily Living, Independent with Medication Management    Finances  Financial Barriers to Discharge: No  Prescription Coverage: Yes    Vision / Hearing Impairment  Right Eye Vision: (P) Wears Glasses, Impaired  Left Eye Vision: (P) Wears Glasses, Impaired  Hearing Impairment: (P) Both Ears, Hearing Device Not Available    Advance Directive  Advance Directive?: None    Domestic Abuse  Have you ever been the victim of abuse or violence?: No    Psychological Assessment  History of Substance Abuse: None  History of Psychiatric Problems: No  Non-compliant with Treatment: No    Anticipated Discharge Information  Discharge Disposition: Discharged to home/self care (01)  Discharge Address: [address] [place], [place] [address] [id number]

## 2024-08-19 NOTE — ED PROVIDER NOTES
ER Provider Note    Scribed for Dr. Andreas Sanz M.D. by Kayleen Garsia. 8/18/2024  8:22 PM    Primary Care Provider: Betty Villaseñor M.D.    CHIEF COMPLAINT  Chief Complaint   Patient presents with    Possible Stroke     Brought by ems due to stroke like symptoms    Symptoms: + CONFUSION, SLURRED SPEECH    Last Known Well: (?)  Onset of symptoms: 0000 AM  NIH: 2  GCS: 14/15 (Alert and Oriented x 3 - person, place, situation), disoriented  x time    Contraptions: IV gauge 18 Rt AC  Interventions given: Blood Glucose en route: 168 mg/dL    Evaluated by the ERP  Sent to CT scan  Transferred to pt's room and endorsed to the assigned RN    Hypertension     Blood pressure en route: 200/120 mmHg  + Nausea and Vomiting    N/V       EXTERNAL RECORDS REVIEWED  Inpatient Notes Patient had a pacemaker inserted in July of 23.    HPI/ROS    Andreas Ortega is a 73 y.o. male who presents to the ED for evaluation of possible stroke. The patient was experiencing some dysarthria around midnight last night and went to bed, although this morning his wife became concerned based on his persistent symptoms. He is confused at this time, and nauseated.       PAST MEDICAL HISTORY  Past Medical History:   Diagnosis Date    Arthritis     hands, knees, shoulders    Backpain     Breath shortness 07/11/2023    on exertion    Cancer (HCC)     prostectomy, R kidney removal, skin    Diabetes     borderline    Fall     3 days ago    Heart burn 07/11/2023    takes Pepsid    High cholesterol 07/11/2023    unable to tolerate statins    Hypertension     Indigestion     Prostate cancer (HCC)     Psychiatric problem 07/11/2023    situational anxiety    Renal cancer (HCC)     Skin cancer        SURGICAL HISTORY  Past Surgical History:   Procedure Laterality Date    PB PARTIAL HIP REPLACEMENT  5/16/2023    Procedure: HEMIARTHROPLASTY, HIP;  Surgeon: Denver Carrillo M.D.;  Location: SURGERY [facility];  Service: Orthopedics    GASTROSCOPY WITH  BIOPSY  1/23/2012    Performed by OBDULIO ALEJANDRA at SURGERY [facility]    EGD W/ENDOSCOPIC ULTRASOUND  1/23/2012    Performed by OBDULIO ALEJANDRA at [facility]    ERCP W/SPHINCTEROTOMY/PAPILL.  1/23/2012    Performed by OBDULIO ALEJANDRA at [facility]    ERCP W/REMOVAL CALCULUS  1/23/2012    Performed by OBDULIO ALEJANDRA at [facility]    PROSTATECTOMY, RADIAL  2005    OTHER  1984    Right kidney removed    OTHER ORTHOPEDIC SURGERY  1974    R knee surgery, repair    APPENDECTOMY  1966       FAMILY HISTORY  Family History   Problem Relation Age of Onset    Cancer Unknown     Diabetes Unknown     Hypertension Unknown     Heart Disease Unknown     Stroke Unknown        SOCIAL HISTORY   reports that he quit smoking about 40 years ago. His smoking use included cigarettes. He started smoking about 58 years ago. He has a 36 pack-year smoking history. He has been exposed to tobacco smoke. He quit smokeless tobacco use about 30 years ago.  His smokeless tobacco use included chew and snuff. He reports that he does not drink alcohol and does not use drugs.    CURRENT MEDICATIONS  Previous Medications    ALLOPURINOL (ZYLOPRIM) 300 MG TAB    Take 150 mg by mouth every day.    ALOGLIPTIN BENZOATE 25 MG TAB    Take 25 mg by mouth every evening.    APIXABAN (ELIQUIS) 5MG TAB    Take 1 Tablet by mouth 2 times a day.    CYANOCOBALAMIN (VITAMIN B-12) 1000 MCG TAB    Take 2,000 mcg by mouth every day.    HYDROCHLOROTHIAZIDE (HYDRODIURIL) 12.5 MG TABLET    Take 12.5 mg by mouth every morning.    METOPROLOL TARTRATE (LOPRESSOR) 50 MG TAB    Take 1 Tablet by mouth 2 times a day.    OXYCODONE IMMEDIATE RELEASE (ROXICODONE) 10 MG IMMEDIATE RELEASE TABLET    Take 10 mg by mouth every 6 hours as needed (for pain).    PREGABALIN (LYRICA) 150 MG CAP    Take 300 mg by mouth every evening.    VITAMIN D2, ERGOCALCIFEROL, (DRISDOL) 1.25 MG (24443 UT) CAP CAPSULE    Take 50,000 Units by mouth every 7 days.  "Sunday       ALLERGIES  Pcn [penicillins], Levofloxacin, Metformin, Pravastatin, Rosuvastatin, Simvastatin, and Statins [hmg-coa-r inhibitors]    PHYSICAL EXAM  BP (!) 170/92   Pulse 80   Temp 36.8 °C (98.2 °F) (Temporal)   Resp 15   Ht 1.88 m (6' 2\")   Wt 109 kg (240 lb)   SpO2 95%   BMI 30.81 kg/m²   Constitutional: Mild confusion.   HENT: No signs of trauma, Bilateral external ears normal, Nose normal.   Eyes: Pupils are equal and reactive, Conjunctiva normal, Non-icteric.   Neck: Normal range of motion, No tenderness, Supple,   Cardiovascular: Irregular rhythm.  No murmur  Thorax & Lungs: Normal breath sounds, No respiratory distress, No wheezing, No chest tenderness.   Abdomen: Bowel sounds normal, Soft, No tenderness,   Skin: Warm, Dry, No erythema, No rash.   Back: No bony tenderness, No CVA tenderness.   Extremities: No edema, No tenderness, No cyanosis, normal pulses  Neurologic: Slight slurring of words, does appear baseline. Slight asymmetry on the left side. Normal finger to nose. Normal strength and sensation.   Psychiatric: Slight slurring of words.     DIAGNOSTIC STUDIES & PROCEDURES    Labs:   Labs Reviewed   CBC WITH DIFFERENTIAL - Abnormal; Notable for the following components:       Result Value    Lymphocytes 21.10 (*)     All other components within normal limits   COMP METABOLIC PANEL - Abnormal; Notable for the following components:    Glucose 174 (*)     Total Bilirubin 3.5 (*)     All other components within normal limits   TROPONIN - Abnormal; Notable for the following components:    Troponin T 27 (*)     All other components within normal limits   PROTHROMBIN TIME   APTT   COD (ADULT)   ABO RH CONFIRM   ESTIMATED GFR     All labs reviewed by me.    EKG Interpretation:  Interpreted by me  A flutter with varied AV block noted which is same as prior.    Radiology:   The attending Emergency Physician has independently interpreted the diagnostic imaging associated with this visit and is " "awaiting the final reading from the radiologist, which will be displayed below.    Preliminary interpretation is a follows: No intracranial bleed  Radiologist interpretation:      CT-CEREBRAL PERFUSION ANALYSIS   Final Result      1. Cerebral blood flow less than 30% possibly representing completed infarct = 0 mL.   2. T Max more than 6 seconds possibly representing combination of completed infarct and ischemia = 0 mL.   3. Mismatched volume possibly representing ischemic brain/penumbra= 0 mL      4.  Please note that this cerebral perfusion study and report is Quantitative and targets supratentorial (cerebral) perfusion for evaluation of large vessel territory acute ischemia/infarction. For example, lacunar infarcts, and brainstem/posterior fossa    ischemia/infarction are not evaluated on this study.  Data acquisition is subject to artifacts which can yield non-anatomically plausible perfusion maps which may be due to motion, bolus timing, signal to noise ratio, or other technical factors.    Perfusion map abnormalities which show non-anatomic distributions are likely artifact.   This study is not \"stand-alone\" and should only be utilized for diagnosis, management/treatment in correlation with CT, CTA, and/or MRI and clinical factors.         CT-CTA NECK WITH & W/O-POST PROCESSING   Final Result      Atherosclerosis without significant stenosis, occlusion, or aneurysm in the neck.      CT-CTA HEAD WITH & W/O-POST PROCESS   Final Result      Atherosclerosis without occlusion or aneurysm.      CT-HEAD W/O   Final Result         1. Atrophy and chronic white matter disease.   2. No definite acute intracranial abnormality.               DX-CHEST-PORTABLE (1 VIEW)    (Results Pending)        COURSE & MEDICAL DECISION MAKING    ED Observation Status? No; Patient does not meet criteria for ED Observation.     INITIAL ASSESSMENT AND PLAN  Care Narrative:       8:22 PM - Patient seen and evaluated as code stroke.  Discussed " plan of care, including diagnostic workup. Patient agrees to plan of care. Patient will be treated with Omnipaque and Cardene for his symptoms. Ordered DX chest, CT head without, CT Cerebral perfusion analysis, CT CTA head with and without post process, CT CTA neck with and without post processing, CBC w/ differential, CMP, prothrombin time, troponin, EKG to evaluate. Dr. Hernandez (Neurologist) present to evaluate the patient as well.     CRITICAL CARE  The very real possibilty of a deterioration of this patient's condition required the highest level of my preparedness for sudden, emergent intervention.  I provided critical care services, which included medication orders, frequent reevaluations of the patient's condition and response to treatment, ordering and reviewing test results, and discussing the case with various consultants.  The critical care time associated with the care of the patient was 40 minutes. Review chart for interventions. This time is exclusive of any other billable procedures.     Dr. Kulkarni (Hospitalist) will see the patient for admission.     ADDITIONAL PROBLEM LIST AND DISPOSITION   Patient with intermittent extreme hypertension and slurring of words as well as concern for stroke.  Imaging shows no obvious occlusive vessel.  Troponin mildly elevated.  Concern for severe hypertension and treated with nicardipine.  Blood pressure did drop significantly.  Ultimately will admit to the intensive care unit given labile blood pressures.  Admitted in critical condition.               DISPOSITION AND DISCUSSIONS  I have discussed management of the patient with the following physicians and LILLIANA's: Dr. Hernandez (Neurologist) Dr. Kulkarni (Hospitalist)     Discussion of management with other hospitals or appropriate source(s): None       Decision tools and prescription drugs considered including, but not limited to:  Hypertensives as needed. .    DISPOSITION:  Patient will be hospitalized by Dr. Kulkarni in critical  condition.    FINAL IMPRESSION   1. Hypertensive emergency         Kayleen GAINES (Scribe), am scribing for, and in the presence of, Andreas Sanz M.D..    Electronically signed by: Kayleen Garsia (Scribe), 8/18/2024    Andreas GAINES M.D. personally performed the services described in this documentation, as scribed by Kayleen Garsia in my presence, and it is both accurate and complete.    The note accurately reflects work and decisions made by me.  Andreas Sanz M.D.  8/19/2024  1:44 AM

## 2024-08-19 NOTE — PROGRESS NOTES
"Critical Care Progress Note    Date of admission  8/18/2024    Chief Complaint/Hospital Course  \"73 y.o. male with a history of atrial fibrillation on Eliquis, hypertension, diabetes mellitus and hyperlipidemia who presented 8/18/2024 with slurred speech that was first noted at midnight last night.  His symptoms continued throughout the day until EMS was contacted this evening.  When EMS arrived they noted his systolic blood pressure was in the 200s.  Fingerstick blood glucose was 168.  That he had difficulty speaking but no other neurologic symptoms or weakness.  He was transported to [facility] emergency department where stroke alert was activated, by that time his slurred speech had improved but not resolved completely.  CT and CT perfusion reveals no acute intracranial abnormalities or large vessel occlusions.  Thrombolytic therapy was not recommended.  Nicardipine infusion was initiated for his hypertension.  Critical care consultation was requested for further evaluation and management. \" Dr. Kulkarni    Interval Problem Update  Reviewed last 24 hour events:    Cardiac: HR 60s, /80s on nicardipine gtt  Pulm: RA  Heme: wnl  /renal: wnl  GI/endo: NPO  ID:  NA   I/O: -800cc  Additional Labs/Imaging:   - tbili 3 - hx elevated bili 1.8-2, had prior ERCP 2012 w/biliary sphincterotomy and CBD stone extraction    Review of Systems  Review of Systems   Constitutional:  Negative for chills, fever and malaise/fatigue.   Respiratory:  Negative for cough and shortness of breath.    Cardiovascular:  Negative for chest pain.   Gastrointestinal:  Negative for diarrhea and vomiting.   Musculoskeletal:  Negative for falls.   Neurological:  Negative for focal weakness.        Vital Signs for last 24 hours   Temp:  [35.7 °C (96.3 °F)-36.8 °C (98.2 °F)] 36.5 °C (97.7 °F)  Pulse:  [69-87] 69  Resp:  [13-48] 20  BP: (127-247)/() 168/103  SpO2:  [87 %-96 %] 94 %    Hemodynamic parameters for last 24 " hours       Respiratory Information for the last 24 hours       Physical Exam   Physical Exam  Vitals and nursing note reviewed.   Constitutional:       General: He is not in acute distress.     Appearance: He is not ill-appearing.   HENT:      Head: Normocephalic.      Mouth/Throat:      Mouth: Mucous membranes are moist.   Eyes:      Conjunctiva/sclera: Conjunctivae normal.   Cardiovascular:      Rate and Rhythm: Normal rate and regular rhythm.   Pulmonary:      Effort: Pulmonary effort is normal. No respiratory distress.   Abdominal:      Palpations: Abdomen is soft.   Musculoskeletal:         General: No deformity.   Skin:     General: Skin is warm and dry.   Neurological:      Mental Status: He is alert. Mental status is at baseline.   Psychiatric:         Mood and Affect: Mood normal.         Medications  Current Facility-Administered Medications   Medication Dose Route Frequency Provider Last Rate Last Admin    acetaminophen (Tylenol) tablet 650 mg  650 mg Oral Q6HRS PRN Hyun Mackey M.D.   650 mg at 08/19/24 0255    losartan (Cozaar) tablet 50 mg  50 mg Oral Q DAY My Cardona M.D.   50 mg at 08/19/24 0754    lactated ringers infusion   Intravenous Continuous Rehan Kulkarni M.D. 75 mL/hr at 08/19/24 0725 Rate Verify at 08/19/24 0725    niCARdipine (Cardene) 25 mg in  mL Standard Infusion  0-15 mg/hr Intravenous Continuous Rehan Kulkarni M.D.   Stopped at 08/19/24 0501    metoprolol tartrate (Lopressor) tablet 50 mg  50 mg Oral BID Rehan Kulkarni M.D.        apixaban (Eliquis) tablet 5 mg  5 mg Oral BID Rehan Kulkarni M.D.   5 mg at 08/19/24 0500       Fluids    Intake/Output Summary (Last 24 hours) at 8/19/2024 0913  Last data filed at 8/19/2024 0725  Gross per 24 hour   Intake 829.48 ml   Output 1075 ml   Net -245.52 ml       Laboratory          Recent Labs     08/18/24 2004 08/19/24  0305   SODIUM 140 140   POTASSIUM 3.8 3.6   CHLORIDE 103 103   CO2 23 23   BUN 21 16   CREATININE 0.98  0.76   MAGNESIUM  --  1.9   PHOSPHORUS  --  2.3*   CALCIUM 9.4 8.9     Recent Labs     08/18/24 2004 08/19/24  0305   ALTSGPT 17 19   ASTSGOT 19 22   ALKPHOSPHAT 84 79   TBILIRUBIN 3.5* 3.0*   GLUCOSE 174* 130*     Recent Labs     08/18/24 2004 08/19/24  0014 08/19/24  0305   WBC 6.6 7.1  --    NEUTSPOLYS 66.60 70.70  --    LYMPHOCYTES 21.10* 18.20*  --    MONOCYTES 10.30 9.70  --    EOSINOPHILS 0.90 0.40  --    BASOPHILS 0.80 0.60  --    ASTSGOT 19  --  22   ALTSGPT 17  --  19   ALKPHOSPHAT 84  --  79   TBILIRUBIN 3.5*  --  3.0*     Recent Labs     08/18/24 2004 08/19/24  0014   RBC 5.47 5.13   HEMOGLOBIN 17.2 16.1   HEMATOCRIT 50.3 47.3   PLATELETCT 201 210   PROTHROMBTM 13.0  --    APTT 28.3  --    INR 0.97  --        Imaging  Reviewed     Assessment/Plan  * Accelerated hypertension- (present on admission)  Assessment & Plan  Nicardipine infusion - wean as tolerated, will plan to normalize BP today with goal <160  Start losartan 50mg daily  Continue metoprolol 50mg BID  Serum metanephrines  F/u lipid panel    Elevated bilirubin  Assessment & Plan  Tbili 3 - hx elevated bili 1.8-2, had prior ERCP 2012 w/biliary sphincterotomy and CBD stone extraction  Downtrending - no abd symptoms    Atrial flutter (HCC)  Assessment & Plan  Rate control   Continue Eliquis  Continue metoprolol regimen 50 mg twice daily    DM (diabetes mellitus) (HCC)- (present on admission)  Assessment & Plan  Holding home sitagliptin - can restart once stable after starting PO diet  F/u a1c         VTE:  NOAC  Ulcer: Not Indicated  Lines: None    I have performed a physical exam and reviewed and updated ROS and Plan today (8/19/2024). In review of yesterday's note (8/18/2024), there are no changes except as documented above.     Discussed patient condition and risk of morbidity and/or mortality with RN, RT, Pharmacy, Charge nurse / hot rounds, and Patient  The patient remains critically ill.  Critical care time = 40 minutes in directly  providing and coordinating critical care and extensive data review.  No time overlap and excludes procedures.        My Cardona MD  Pulmonary and Critical Care Medicine  [facility]

## 2024-08-19 NOTE — CONSULTS
Critical Care Consultation  (To serve as the admission H&P)    Date of consult: 8/18/2024    Referring Physician  Rehan Kulkarni M.D.    Reason for Consultation  Accelerated hypertension    History of Presenting Illness  73 y.o. male with a history of atrial fibrillation on Eliquis, hypertension, diabetes mellitus and hyperlipidemia who presented 8/18/2024 with slurred speech that was first noted at midnight last night.  His symptoms continued throughout the day until EMS was contacted this evening.  When EMS arrived they noted his systolic blood pressure was in the 200s.  Fingerstick blood glucose was 168.  That he had difficulty speaking but no other neurologic symptoms or weakness.  He was transported to [facility] emergency department where stroke alert was activated, by that time his slurred speech had improved but not resolved completely.  CT and CT perfusion reveals no acute intracranial abnormalities or large vessel occlusions.  Thrombolytic therapy was not recommended.  Nicardipine infusion was initiated for his hypertension.  Critical care consultation was requested for further evaluation and management.    Code Status  Full Code    Review of Systems  Review of Systems   HENT: Negative.     Respiratory: Negative.     Cardiovascular: Negative.    Gastrointestinal: Negative.    Neurological:  Positive for speech change.   Psychiatric/Behavioral: Negative.     All other systems reviewed and are negative.      Past Medical History   has a past medical history of Arthritis, Backpain, Breath shortness (07/11/2023), Cancer ([facility]), Diabetes, Fall, Heart burn (07/11/2023), High cholesterol (07/11/2023), Hypertension, Indigestion, Prostate cancer (HCC), Psychiatric problem (07/11/2023), Renal cancer (HCC), and Skin cancer.    Surgical History   has a past surgical history that includes prostatectomy, radial (2005); other (1984); other orthopedic surgery (1974); appendectomy (1966); gastroscopy  with biopsy (1/23/2012); egd w/endoscopic ultrasound (1/23/2012); ercp w/sphincterotomy/papill. (1/23/2012); ercp w/removal calculus (1/23/2012); and pr partial hip replacement (5/16/2023).    Family History  family history includes Cancer in his unknown relative; Diabetes in his unknown relative; Heart Disease in his unknown relative; Hypertension in his unknown relative; Stroke in his unknown relative.    Social History   reports that he quit smoking about 40 years ago. His smoking use included cigarettes. He started smoking about 58 years ago. He has a 36 pack-year smoking history. He has been exposed to tobacco smoke. He quit smokeless tobacco use about 30 years ago.  His smokeless tobacco use included chew and snuff. He reports that he does not drink alcohol and does not use drugs.    Medications  Home Medications    **Home medications have not yet been reviewed for this encounter**       Audit from Redirected Encounters    **Home medications have not yet been reviewed for this encounter**       Current Facility-Administered Medications   Medication Dose Route Frequency Provider Last Rate Last Admin    lactated ringers infusion   Intravenous Continuous Rehan Kulkarni M.D.        niCARdipine (Cardene) 25 mg in  mL Standard Infusion  0-15 mg/hr Intravenous Continuous Rehan Kulkarni M.D.         Current Outpatient Medications   Medication Sig Dispense Refill    apixaban (ELIQUIS) 5mg Tab Take 1 Tablet by mouth 2 times a day. 60 Tablet 2    metoprolol tartrate (LOPRESSOR) 50 MG Tab Take 1 Tablet by mouth 2 times a day. 180 Tablet 3    Alogliptin Benzoate 25 MG Tab Take 25 mg by mouth every evening.      pregabalin (LYRICA) 150 MG Cap Take 300 mg by mouth every evening.      oxyCODONE immediate release (ROXICODONE) 10 MG immediate release tablet Take 10 mg by mouth every 6 hours as needed (for pain).      vitamin D2, Ergocalciferol, (DRISDOL) 1.25 MG (47966 UT) Cap capsule Take 50,000 Units by mouth every 7  days. Sunday      allopurinol (ZYLOPRIM) 300 MG Tab Take 150 mg by mouth every day.      Cyanocobalamin (VITAMIN B-12) 1000 MCG Tab Take 2,000 mcg by mouth every day.      hydroCHLOROthiazide (HYDRODIURIL) 12.5 MG tablet Take 12.5 mg by mouth every morning.         Allergies  Allergies   Allergen Reactions    Pcn [Penicillins] Anaphylaxis     As a child, throat swelled     Levofloxacin Nausea     Nausea, flushed and headache    Metformin Nausea     Nausea, flushed and headache    Pravastatin Nausea     Nausea,flushed and headache    Rosuvastatin Nausea     Flushed, headache      Simvastatin Nausea     Nausea, flushed and headache    Statins [Hmg-Coa-R Inhibitors] Nausea     Nausea, flushed and headache       Vital Signs last 24 hours  Temp:  [36.8 °C (98.2 °F)] 36.8 °C (98.2 °F)  Pulse:  [69-84] 70  Resp:  [14-26] 19  BP: (127-247)/() 127/68  SpO2:  [93 %-95 %] 94 %    Physical Exam  Physical Exam  Constitutional:       General: He is not in acute distress.  HENT:      Mouth/Throat:      Mouth: Mucous membranes are moist.   Eyes:      Extraocular Movements: Extraocular movements intact.      Pupils: Pupils are equal, round, and reactive to light.   Cardiovascular:      Rate and Rhythm: Normal rate. Rhythm irregular.      Heart sounds: No murmur heard.     No friction rub. No gallop.   Pulmonary:      Effort: Pulmonary effort is normal. No respiratory distress.      Breath sounds: Normal breath sounds.   Abdominal:      General: There is no distension.      Palpations: Abdomen is soft.      Tenderness: There is no abdominal tenderness.   Musculoskeletal:      Cervical back: Neck supple.      Right lower leg: No edema.      Left lower leg: No edema.   Skin:     General: Skin is warm and dry.   Neurological:      Mental Status: He is alert and oriented to person, place, and time.      Cranial Nerves: No cranial nerve deficit.      Comments: Awake alert answering questions appropriately, mild dysarthria is noted.   No other focal neurologic deficits appreciated.   Psychiatric:         Mood and Affect: Mood normal.         Behavior: Behavior normal.         Fluids    Intake/Output Summary (Last 24 hours) at 8/18/2024 2147  Last data filed at 8/18/2024 2129  Gross per 24 hour   Intake 82.78 ml   Output --   Net 82.78 ml       Laboratory  Recent Results (from the past 48 hour(s))   CBC WITH DIFFERENTIAL    Collection Time: 08/18/24  8:04 PM   Result Value Ref Range    WBC 6.6 4.8 - 10.8 K/uL    RBC 5.47 4.70 - 6.10 M/uL    Hemoglobin 17.2 14.0 - 18.0 g/dL    Hematocrit 50.3 42.0 - 52.0 %    MCV 92.0 81.4 - 97.8 fL    MCH 31.4 27.0 - 33.0 pg    MCHC 34.2 32.3 - 36.5 g/dL    RDW 46.3 35.9 - 50.0 fL    Platelet Count 201 164 - 446 K/uL    MPV 9.9 9.0 - 12.9 fL    Neutrophils-Polys 66.60 44.00 - 72.00 %    Lymphocytes 21.10 (L) 22.00 - 41.00 %    Monocytes 10.30 0.00 - 13.40 %    Eosinophils 0.90 0.00 - 6.90 %    Basophils 0.80 0.00 - 1.80 %    Immature Granulocytes 0.30 0.00 - 0.90 %    Nucleated RBC 0.00 0.00 - 0.20 /100 WBC    Neutrophils (Absolute) 4.41 1.82 - 7.42 K/uL    Lymphs (Absolute) 1.40 1.00 - 4.80 K/uL    Monos (Absolute) 0.68 0.00 - 0.85 K/uL    Eos (Absolute) 0.06 0.00 - 0.51 K/uL    Baso (Absolute) 0.05 0.00 - 0.12 K/uL    Immature Granulocytes (abs) 0.02 0.00 - 0.11 K/uL    NRBC (Absolute) 0.00 K/uL   COMP METABOLIC PANEL    Collection Time: 08/18/24  8:04 PM   Result Value Ref Range    Sodium 140 135 - 145 mmol/L    Potassium 3.8 3.6 - 5.5 mmol/L    Chloride 103 96 - 112 mmol/L    Co2 23 20 - 33 mmol/L    Anion Gap 14.0 7.0 - 16.0    Glucose 174 (H) 65 - 99 mg/dL    Bun 21 8 - 22 mg/dL    Creatinine 0.98 0.50 - 1.40 mg/dL    Calcium 9.4 8.5 - 10.5 mg/dL    Correct Calcium 9.2 8.5 - 10.5 mg/dL    AST(SGOT) 19 12 - 45 U/L    ALT(SGPT) 17 2 - 50 U/L    Alkaline Phosphatase 84 30 - 99 U/L    Total Bilirubin 3.5 (H) 0.1 - 1.5 mg/dL    Albumin 4.2 3.2 - 4.9 g/dL    Total Protein 6.7 6.0 - 8.2 g/dL    Globulin 2.5 1.9 -  3.5 g/dL    A-G Ratio 1.7 g/dL   PROTHROMBIN TIME    Collection Time: 24  8:04 PM   Result Value Ref Range    PT 13.0 12.0 - 14.6 sec    INR 0.97 0.87 - 1.13   APTT    Collection Time: 24  8:04 PM   Result Value Ref Range    APTT 28.3 24.7 - 36.0 sec   COD (ADULT)    Collection Time: 24  8:04 PM   Result Value Ref Range    ABO Grouping Only A     Rh Grouping Only POS     Antibody Screen-Cod NEG    TROPONIN    Collection Time: 24  8:04 PM   Result Value Ref Range    Troponin T 27 (H) 6 - 19 ng/L   ESTIMATED GFR    Collection Time: 24  8:04 PM   Result Value Ref Range    GFR (CKD-EPI) 80 >60 mL/min/1.73 m 2   ABO Rh Confirm    Collection Time: 24  8:22 PM   Result Value Ref Range    ABO Rh Confirm A POS    EKG (NOW)    Collection Time: 24  8:31 PM   Result Value Ref Range    Report       [facility]    Test Date:  2024  Pt Name:    AMADO BEDOLLA                   Department: ER  MRN:        [id number]                      Room:        11  Gender:     Male                         Technician: [id number]  :        [id number]                   Requested By:REINIER VASQUEZ  Order #:    [id number]                    Reading MD:    Measurements  Intervals                                Axis  Rate:       70                           P:          0  MS:         0                            QRS:        -56  QRSD:       183                          T:          90  QT:         508  QTc:        549    Interpretive Statements  Atrial flutter with varied AV block,  Left bundle branch block  Compared to ECG 2023 16:47:27  Left bundle-branch block now present         Imaging  DX-CHEST-PORTABLE (1 VIEW)   Final Result      No acute cardiac or pulmonary abnormalities are identified.      CT-CEREBRAL PERFUSION ANALYSIS   Final Result      1. Cerebral blood flow less than 30% possibly representing completed infarct = 0 mL.   2. T Max more than 6 seconds possibly  "representing combination of completed infarct and ischemia = 0 mL.   3. Mismatched volume possibly representing ischemic brain/penumbra= 0 mL      4.  Please note that this cerebral perfusion study and report is Quantitative and targets supratentorial (cerebral) perfusion for evaluation of large vessel territory acute ischemia/infarction. For example, lacunar infarcts, and brainstem/posterior fossa    ischemia/infarction are not evaluated on this study.  Data acquisition is subject to artifacts which can yield non-anatomically plausible perfusion maps which may be due to motion, bolus timing, signal to noise ratio, or other technical factors.    Perfusion map abnormalities which show non-anatomic distributions are likely artifact.   This study is not \"stand-alone\" and should only be utilized for diagnosis, management/treatment in correlation with CT, CTA, and/or MRI and clinical factors.         CT-CTA NECK WITH & W/O-POST PROCESSING   Final Result      Atherosclerosis without significant stenosis, occlusion, or aneurysm in the neck.      CT-CTA HEAD WITH & W/O-POST PROCESS   Final Result      Atherosclerosis without occlusion or aneurysm.      CT-HEAD W/O   Final Result         1. Atrophy and chronic white matter disease.   2. No definite acute intracranial abnormality.                   Assessment/Plan  * Accelerated hypertension- (present on admission)  Assessment & Plan  Nicardipine infusion  Goal systolic blood pressure 160-180  Will begin to target normotension after the next 24 hours.  Serum metanephrines    Atrial flutter (HCC)  Assessment & Plan  Rate control   Continue Eliquis  Continue metoprolol regimen 50 mg twice daily    DM (diabetes mellitus) (HCC)- (present on admission)  Assessment & Plan  Moderate glycemic control with insulin therapy.        Discussed patient condition and risk of morbidity and/or mortality with RN, Patient, and neurology.      The patient remains critically ill,  I have assessed " and reassessed the blood pressure,  cardiovascular status, labs and response to interventions. This patient remains at high risk for worsening shock and death without the above critical care interventions.    Critical care time = 60 minutes in directly providing and coordinating critical care and extensive data review.  No time overlap and excludes procedures.

## 2024-08-19 NOTE — PROGRESS NOTES
Patient complained of new headache. BP Stable, no new confusion noted, PERTREE. Dr. Mackey was notified. PRN Tylenol ordered

## 2024-08-19 NOTE — ED NOTES
"Med rec updated and complete. Allergies reviewed.    Pt confirmed name and date of birth.     Pt able to state that he took his morning medications.  However, pt not able to clarify the \"exact time\" he took them.      Last dose of Eliquis 08/18/24.      Pt denies outpatient antibiotic use in last 30 days at home.      Home pharmacy  [phone]     "

## 2024-08-19 NOTE — PROGRESS NOTES
"Neurology Progress Note  Neurohospitalist Service, [facility]    Referring Physician: My Cardona M.D.      Interval History: No acute events overnight.  Improved BP control, now off cardene.  Speech feels subjectively improved.  States has sinus congestion.    Review of systems: In addition to what is detailed in the HPI and/or updated in the interval history, all other systems reviewed and are negative.    Past Medical History, Past Surgical History and Social History reviewed and unchanged from prior    Medications:    Current Facility-Administered Medications:     acetaminophen (Tylenol) tablet 650 mg, 650 mg, Oral, Q6HRS PRN, Hyun Mackey M.D., 650 mg at 08/19/24 0255    losartan (Cozaar) tablet 50 mg, 50 mg, Oral, Q DAY, My Cardona M.D., 50 mg at 08/19/24 0754    potassium chloride SA (Kdur) tablet 40 mEq, 40 mEq, Oral, Once, My Cardona M.D.    phosphorus (K-Phos-Neutral) per tablet 500 mg, 500 mg, Oral, BID, My Cardona M.D.    magnesium sulfate IVPB premix 2 g, 2 g, Intravenous, Once, My Cardona M.D.    lactated ringers infusion, , Intravenous, Continuous, Rehan Kulkarni M.D., Last Rate: 75 mL/hr at 08/19/24 0725, Rate Verify at 08/19/24 0725    metoprolol tartrate (Lopressor) tablet 50 mg, 50 mg, Oral, BID, Rehan Kulkarni M.D., 50 mg at 08/19/24 0938    apixaban (Eliquis) tablet 5 mg, 5 mg, Oral, BID, Rehan Kulkarni M.D., 5 mg at 08/19/24 0500    Physical Examination:   BP (!) 202/106   Pulse 69   Temp 36.5 °C (97.7 °F) (Temporal)   Resp 12   Ht 1.88 m (6' 2\")   Wt 109 kg (240 lb)   SpO2 91%   BMI 30.81 kg/m²       General: Patient is awake and in no acute distress  Neck: There is normal range of motion  CV: Regular rate   Extremities:  Warm, dry, and intact, without peripheral lower extremity edema    NEUROLOGICAL EXAM:     Mental status: Awake, alert   Speech and language: Speech is mildly dysarthric and fluent. The patient is " able to name and repeat, and follow commands.    Cranial nerve exam:  Visual fields are full. There is no nystagmus. Extraocular muscles are intact. Face is symmetric.   Motor exam: There is sustained antigravity with no downward drift in bilateral arms and legs.    Sensory exam:  Reacts to tactile in all 4 distal extremities, there is no neglect to double stim.  Coordination: No ataxia on bilateral finger-to-nose testing.  Gait: Deferred due to patient preference.     NIHSS: National Institutes of Health Stroke Scale     [0] 1a:Level of Consciousness           0-alert 1-drowsy   2-stupor   3-coma  [0] 1b:LOC Questions                         0-both  1-one      2-neither  [0] 1c:LOC Commands                       0-both  1-one      2-neither  [0] 2: Best Gaze                      0-nl    1-partial  2-forced  [0] 3: Visual Fields                              0-nl    1-partial  2-complete 3-bilat  [0] 4: Facial Paresis                0-nl    1-minor    2-partial  3-full  MOTOR                                              0-nl  [0] 5: Right Arm                       1-drift  [0] 6: Left Arm                                     2-some effort vs gravity  [0] 7: Right Leg                       3-no effort vs gravity  [0] 8: Left Leg                                      4-no movement                                                              x-untestable  [0] 9: Limb Ataxia                    0-abs   1-1_limb   2-2+_limbs                                                              x-untestable  [0] 10:Sensory                        0-nl    1-partial  2-dense  [0] 11:Best Language/Aphasia         0-nl    1-mild/mod 2-severe   3-mute  [1] 12:Dysarthria                     0-nl    1-mild/mod 2-severe                                                              x-untestable  [0] 13:Neglect/Inattention                   0-none  1-partial  2-complete  [1] TOTAL      Objective Data:    Labs:  Lab Results   Component Value  Date/Time    PROTHROMBTM 13.0 08/18/2024 08:04 PM    INR 0.97 08/18/2024 08:04 PM      Lab Results   Component Value Date/Time    WBC 7.1 08/19/2024 12:14 AM    RBC 5.13 08/19/2024 12:14 AM    HEMOGLOBIN 16.1 08/19/2024 12:14 AM    HEMATOCRIT 47.3 08/19/2024 12:14 AM    MCV 92.2 08/19/2024 12:14 AM    MCH 31.4 08/19/2024 12:14 AM    MCHC 34.0 08/19/2024 12:14 AM    MPV 10.1 08/19/2024 12:14 AM    NEUTSPOLYS 70.70 08/19/2024 12:14 AM    LYMPHOCYTES 18.20 (L) 08/19/2024 12:14 AM    MONOCYTES 9.70 08/19/2024 12:14 AM    EOSINOPHILS 0.40 08/19/2024 12:14 AM    BASOPHILS 0.60 08/19/2024 12:14 AM      Lab Results   Component Value Date/Time    SODIUM 140 08/19/2024 03:05 AM    POTASSIUM 3.6 08/19/2024 03:05 AM    CHLORIDE 103 08/19/2024 03:05 AM    CO2 23 08/19/2024 03:05 AM    GLUCOSE 130 (H) 08/19/2024 03:05 AM    BUN 16 08/19/2024 03:05 AM    CREATININE 0.76 08/19/2024 03:05 AM      Lab Results   Component Value Date/Time    CHOLSTRLTOT 207 (H) 08/19/2024 03:05 AM     (H) 08/19/2024 03:05 AM    HDL 43 08/19/2024 03:05 AM    TRIGLYCERIDE 137 08/19/2024 03:05 AM       Lab Results   Component Value Date/Time    ALKPHOSPHAT 79 08/19/2024 03:05 AM    ASTSGOT 22 08/19/2024 03:05 AM    ALTSGPT 19 08/19/2024 03:05 AM    TBILIRUBIN 3.0 (H) 08/19/2024 03:05 AM        Imaging/Testing:    I interpreted and/or reviewed the patient's neuroimaging    DX-CHEST-PORTABLE (1 VIEW)   Final Result      No acute cardiac or pulmonary abnormalities are identified.      CT-CEREBRAL PERFUSION ANALYSIS   Final Result      1. Cerebral blood flow less than 30% possibly representing completed infarct = 0 mL.   2. T Max more than 6 seconds possibly representing combination of completed infarct and ischemia = 0 mL.   3. Mismatched volume possibly representing ischemic brain/penumbra= 0 mL      4.  Please note that this cerebral perfusion study and report is Quantitative and targets supratentorial (cerebral) perfusion for evaluation of large  "vessel territory acute ischemia/infarction. For example, lacunar infarcts, and brainstem/posterior fossa    ischemia/infarction are not evaluated on this study.  Data acquisition is subject to artifacts which can yield non-anatomically plausible perfusion maps which may be due to motion, bolus timing, signal to noise ratio, or other technical factors.    Perfusion map abnormalities which show non-anatomic distributions are likely artifact.   This study is not \"stand-alone\" and should only be utilized for diagnosis, management/treatment in correlation with CT, CTA, and/or MRI and clinical factors.         CT-CTA NECK WITH & W/O-POST PROCESSING   Final Result      Atherosclerosis without significant stenosis, occlusion, or aneurysm in the neck.      CT-CTA HEAD WITH & W/O-POST PROCESS   Final Result      Atherosclerosis without occlusion or aneurysm.      CT-HEAD W/O   Final Result         1. Atrophy and chronic white matter disease.   2. No definite acute intracranial abnormality.                   Assessment and Plan:  Andreas Ortega is a 73 year old man presenting with improving dysarthria, without lateralizing symptoms.  Stroke protocol CT with diffuse atherosclerotic burden, but no hemorrhage, no large territory stroke, no critical stenoses or vessel occlusions.  CT perfusion was a normal study.  He is stroke optimized on apixaban therapy. He was a PPM and unable to attain MRI, and regardless, MRI positive results for acute stroke would not change my recommended medical management.  It is possible that his symptoms are related to hypertensive encephalopathy.  In the absence of an ischemic penumbra, he does not require an acute hypertensive response. May continue with BP control.     Problem list:   Hypertensive emergency   Dysarthria   Hypertension   Hyperlipidemia   Type 2 diabetes     Recommendations:              - q4h neurochecks/NIHSS ok              - does not require acute hypertensive response, " immediate BP lowering by ~25% is ok. Long-term goal is 110-130/60-80              - continue apixaban 5mg BID              - may defer MRI brain, TTE/ziopatch as results will not change recommended medical therapies              - stroke labs:  HgbA1c 6.1 and               - statin intolerance, consider PCSK9 inhibitor on outpatient basis for LDL goal < 70              - DM management for goal HgbA1c < 7              - PT/OT/SLP              - on apixaban for DVT chemoppx   - please reconsult Stroke Neurology with any additional questions or concerns    The evaluation of the patient, and recommended management, was discussed with Dr. Cardona, ICU attending. I have performed a physical exam and reviewed and updated ROS and Plan today (8/19/2024).     Venu Hernandez MD  Vascular Neurology

## 2024-08-19 NOTE — PROGRESS NOTES
"Spoke with spouse over the phone and gave updates. Ileana mentioned that  started a new muscle relaxer recently and has been experiencing abnormal side effects including confusion and \"not acting right\". Per patient rec med Baclofen is listed but both the spouse and patient did not remember the medication.   "

## 2024-08-19 NOTE — ASSESSMENT & PLAN NOTE
Had systolic blood pressures in the 200s per EMS with improvement following nicardipine gtt overnight into 8/19. He reports that his pressures are usually in the 140s/80s at home.  - Discontinued nicardipine gtt overnight  - Resume home losartan 50mg QD and metoprolol tartrate 50mg BID

## 2024-08-19 NOTE — CARE PLAN
The patient is Watcher - Medium risk of patient condition declining or worsening    Shift Goals  Clinical Goals: maintain bp between 160-180  Patient Goals: wants to go home  Family Goals: updates    Progress made toward(s) clinical / shift goals:  Frequent neuro assessment perform and SBP was maintained at goal of 160-180.       Problem: Hemodynamics  Goal: Patient's hemodynamics, fluid balance and neurologic status will be stable or improve  8/19/2024 0410 by Michel Dowling R.N.  Outcome: Progressing  Note: SBP was maintained between 160-180 with Nicardipine.   8/19/2024 0407 by Michel Dowling R.N.  Outcome: Progressing     Problem: Pain - Standard  Goal: Alleviation of pain or a reduction in pain to the patient’s comfort goal  Outcome: Progressing  Note: Patient was medicated per MAR.        Patient is not progressing towards the following goals:

## 2024-08-20 LAB
ALBUMIN SERPL BCP-MCNC: 4.1 G/DL (ref 3.2–4.9)
ALBUMIN/GLOB SERPL: 1.8 G/DL
ALP SERPL-CCNC: 83 U/L (ref 30–99)
ALT SERPL-CCNC: 16 U/L (ref 2–50)
ANION GAP SERPL CALC-SCNC: 15 MMOL/L (ref 7–16)
AST SERPL-CCNC: 25 U/L (ref 12–45)
BILIRUB SERPL-MCNC: 3.2 MG/DL (ref 0.1–1.5)
BUN SERPL-MCNC: 18 MG/DL (ref 8–22)
CALCIUM ALBUM COR SERPL-MCNC: 9.7 MG/DL (ref 8.5–10.5)
CALCIUM SERPL-MCNC: 9.8 MG/DL (ref 8.5–10.5)
CHLORIDE SERPL-SCNC: 103 MMOL/L (ref 96–112)
CO2 SERPL-SCNC: 21 MMOL/L (ref 20–33)
CREAT SERPL-MCNC: 0.78 MG/DL (ref 0.5–1.4)
ERYTHROCYTE [DISTWIDTH] IN BLOOD BY AUTOMATED COUNT: 46.8 FL (ref 35.9–50)
GFR SERPLBLD CREATININE-BSD FMLA CKD-EPI: 93 ML/MIN/1.73 M 2
GLOBULIN SER CALC-MCNC: 2.3 G/DL (ref 1.9–3.5)
GLUCOSE BLD STRIP.AUTO-MCNC: 115 MG/DL (ref 65–99)
GLUCOSE BLD STRIP.AUTO-MCNC: 123 MG/DL (ref 65–99)
GLUCOSE BLD STRIP.AUTO-MCNC: 129 MG/DL (ref 65–99)
GLUCOSE SERPL-MCNC: 122 MG/DL (ref 65–99)
HCT VFR BLD AUTO: 53.3 % (ref 42–52)
HGB BLD-MCNC: 18.3 G/DL (ref 14–18)
MAGNESIUM SERPL-MCNC: 2.2 MG/DL (ref 1.5–2.5)
MCH RBC QN AUTO: 31.4 PG (ref 27–33)
MCHC RBC AUTO-ENTMCNC: 34.3 G/DL (ref 32.3–36.5)
MCV RBC AUTO: 91.6 FL (ref 81.4–97.8)
PHOSPHATE SERPL-MCNC: 2.3 MG/DL (ref 2.5–4.5)
PLATELET # BLD AUTO: 241 K/UL (ref 164–446)
PMV BLD AUTO: 9.9 FL (ref 9–12.9)
POTASSIUM SERPL-SCNC: 3.7 MMOL/L (ref 3.6–5.5)
PROT SERPL-MCNC: 6.4 G/DL (ref 6–8.2)
RBC # BLD AUTO: 5.82 M/UL (ref 4.7–6.1)
SODIUM SERPL-SCNC: 139 MMOL/L (ref 135–145)
TSH SERPL DL<=0.005 MIU/L-ACNC: 0.81 UIU/ML (ref 0.38–5.33)
WBC # BLD AUTO: 12.5 K/UL (ref 4.8–10.8)

## 2024-08-20 PROCEDURE — 700102 HCHG RX REV CODE 250 W/ 637 OVERRIDE(OP): Performed by: INTERNAL MEDICINE

## 2024-08-20 PROCEDURE — 700102 HCHG RX REV CODE 250 W/ 637 OVERRIDE(OP)

## 2024-08-20 PROCEDURE — 700102 HCHG RX REV CODE 250 W/ 637 OVERRIDE(OP): Performed by: STUDENT IN AN ORGANIZED HEALTH CARE EDUCATION/TRAINING PROGRAM

## 2024-08-20 PROCEDURE — 84100 ASSAY OF PHOSPHORUS: CPT

## 2024-08-20 PROCEDURE — A9270 NON-COVERED ITEM OR SERVICE: HCPCS | Performed by: HOSPITALIST

## 2024-08-20 PROCEDURE — 700111 HCHG RX REV CODE 636 W/ 250 OVERRIDE (IP)

## 2024-08-20 PROCEDURE — 700102 HCHG RX REV CODE 250 W/ 637 OVERRIDE(OP): Performed by: HOSPITALIST

## 2024-08-20 PROCEDURE — A9270 NON-COVERED ITEM OR SERVICE: HCPCS | Performed by: INTERNAL MEDICINE

## 2024-08-20 PROCEDURE — 99233 SBSQ HOSP IP/OBS HIGH 50: CPT | Performed by: HOSPITALIST

## 2024-08-20 PROCEDURE — A9270 NON-COVERED ITEM OR SERVICE: HCPCS

## 2024-08-20 PROCEDURE — 36415 COLL VENOUS BLD VENIPUNCTURE: CPT

## 2024-08-20 PROCEDURE — 84443 ASSAY THYROID STIM HORMONE: CPT

## 2024-08-20 PROCEDURE — A9270 NON-COVERED ITEM OR SERVICE: HCPCS | Performed by: STUDENT IN AN ORGANIZED HEALTH CARE EDUCATION/TRAINING PROGRAM

## 2024-08-20 PROCEDURE — 82248 BILIRUBIN DIRECT: CPT

## 2024-08-20 PROCEDURE — 85027 COMPLETE CBC AUTOMATED: CPT

## 2024-08-20 PROCEDURE — 700111 HCHG RX REV CODE 636 W/ 250 OVERRIDE (IP): Mod: JZ

## 2024-08-20 PROCEDURE — 83735 ASSAY OF MAGNESIUM: CPT

## 2024-08-20 PROCEDURE — 80053 COMPREHEN METABOLIC PANEL: CPT

## 2024-08-20 PROCEDURE — 770020 HCHG ROOM/CARE - TELE (206)

## 2024-08-20 PROCEDURE — 82962 GLUCOSE BLOOD TEST: CPT | Mod: 91

## 2024-08-20 RX ORDER — LORAZEPAM 2 MG/ML
0.5 INJECTION INTRAMUSCULAR ONCE
Status: COMPLETED | OUTPATIENT
Start: 2024-08-20 | End: 2024-08-20

## 2024-08-20 RX ORDER — POTASSIUM CHLORIDE 1500 MG/1
40 TABLET, EXTENDED RELEASE ORAL ONCE
Status: COMPLETED | OUTPATIENT
Start: 2024-08-20 | End: 2024-08-20

## 2024-08-20 RX ADMIN — METOPROLOL TARTRATE 50 MG: 50 TABLET, FILM COATED ORAL at 18:00

## 2024-08-20 RX ADMIN — DIBASIC SODIUM PHOSPHATE, MONOBASIC POTASSIUM PHOSPHATE AND MONOBASIC SODIUM PHOSPHATE 250 MG: 852; 155; 130 TABLET ORAL at 13:02

## 2024-08-20 RX ADMIN — LOSARTAN POTASSIUM 50 MG: 50 TABLET, FILM COATED ORAL at 05:26

## 2024-08-20 RX ADMIN — DIBASIC SODIUM PHOSPHATE, MONOBASIC POTASSIUM PHOSPHATE AND MONOBASIC SODIUM PHOSPHATE 250 MG: 852; 155; 130 TABLET ORAL at 18:00

## 2024-08-20 RX ADMIN — METOPROLOL TARTRATE 50 MG: 50 TABLET, FILM COATED ORAL at 05:26

## 2024-08-20 RX ADMIN — POTASSIUM CHLORIDE 40 MEQ: 1500 TABLET, EXTENDED RELEASE ORAL at 11:01

## 2024-08-20 RX ADMIN — APIXABAN 5 MG: 5 TABLET, FILM COATED ORAL at 18:00

## 2024-08-20 RX ADMIN — LORAZEPAM 0.5 MG: 2 INJECTION INTRAMUSCULAR; INTRAVENOUS at 01:01

## 2024-08-20 RX ADMIN — HYDRALAZINE HYDROCHLORIDE 10 MG: 20 INJECTION INTRAMUSCULAR; INTRAVENOUS at 01:28

## 2024-08-20 RX ADMIN — APIXABAN 5 MG: 5 TABLET, FILM COATED ORAL at 05:26

## 2024-08-20 ASSESSMENT — ENCOUNTER SYMPTOMS
FOCAL WEAKNESS: 0
SHORTNESS OF BREATH: 0
SPEECH CHANGE: 0
SENSORY CHANGE: 0
ABDOMINAL PAIN: 0
NAUSEA: 0
BLURRED VISION: 0
HEADACHES: 0
NERVOUS/ANXIOUS: 0
FEVER: 0

## 2024-08-20 ASSESSMENT — PAIN DESCRIPTION - PAIN TYPE
TYPE: ACUTE PAIN

## 2024-08-20 ASSESSMENT — FIBROSIS 4 INDEX
FIB4 SCORE: 1.8
FIB4 SCORE: 1.95

## 2024-08-20 ASSESSMENT — PATIENT HEALTH QUESTIONNAIRE - PHQ9
SUM OF ALL RESPONSES TO PHQ9 QUESTIONS 1 AND 2: 0
1. LITTLE INTEREST OR PLEASURE IN DOING THINGS: NOT AT ALL

## 2024-08-20 NOTE — ASSESSMENT & PLAN NOTE
Holding home sitagliptin - can restart once stable after starting PO diet  Monitor accucheck and cover with SSI   Diabetic diet  Hypoglycemic protocol  Current HbA1c 6.1

## 2024-08-20 NOTE — PROGRESS NOTES
I spoke to Andreas's wife Ileana. Ileana was very concerned about Andreas's confusion and variance in baseline neuro status, though it is better than when he was admitted. Ileana voiced concerns about Andreas's discharge, her limited abilities to care for Andreas, and her concerns for post hospital care.     She would like to speak with an attending physician tomorrow regarding the confusion. I recommended that she spoke with case management.     Will pass off in report to next RN.

## 2024-08-20 NOTE — ASSESSMENT & PLAN NOTE
S/p Nicardipine infusion   Continue losartan 50mg daily  Continue metoprolol 50mg BID  Serum metanephrines  F/u lipid panel  Continue IV hydralazine 10 mg every 6 hours as needed.

## 2024-08-20 NOTE — PROGRESS NOTES
NOC HOSPITALIST CROSS COVER    Notified by RN regarding patient demonstrating an episode of expressive aphasia which quickly resolved.  Per neurology note patient is currently on recommended medical management for for his TIA.  Incidence of aphasia came following administration of antihypertensives, overnight will pursue blood pressure control less aggressively as I suspect it is possible that his decrease in blood pressure may have contributed to his aphasic period.        -----------------------------------------------------------------------------------------------------------    Electronically signed by:  ELI Johnson PA-C  Hospitalist Services

## 2024-08-20 NOTE — PROGRESS NOTES
Hospital Medicine Daily Progress Note    Date of Service  8/20/2024    Chief Complaint  Slurred speech    Hospital Course  Andreas Ortega is a 73 y.o. male w/ HTN, DMII, DLD, afib on Eliuquis that was admitted 8/18/2024 with slurred speech.  She noted it at midnight and it continued through the next day and eventually she came to the hospital.  EMS saw BP in 200's. A code stroke was activated here at [facility]. Thrombolytic therapy was not recommended.  For the hypertension he was admitted to ICU and placed on nicardipine drip and then transitioned off to home BP meds with resolve of dysarthria.     Interval Problem Update  8/20: Seems to have slight word finding difficulty but speech clear.  He feels he is at his baseline status.  No headache.    I have discussed this patient's plan of care and discharge plan at IDT rounds today with Case Management, Nursing, Nursing leadership, and other members of the IDT team.    Consultants/Specialty  critical care and neurology    Code Status  Full Code    Disposition  The patient is not medically cleared for discharge to home or a post-acute facility.      I have placed the appropriate orders for post-discharge needs.    Review of Systems  Review of Systems   Constitutional:  Negative for fever and malaise/fatigue.   Eyes:  Negative for blurred vision.   Respiratory:  Negative for shortness of breath.    Gastrointestinal:  Negative for abdominal pain and nausea.   Neurological:  Negative for sensory change, speech change, focal weakness and headaches.   Psychiatric/Behavioral:  The patient is not nervous/anxious.         Physical Exam  Temp:  [35.7 °C (96.2 °F)-37 °C (98.6 °F)] 36.8 °C (98.2 °F)  Pulse:  [] 70  Resp:  [16-57] 22  BP: (141-199)/() 154/99  SpO2:  [90 %-97 %] 92 %    Physical Exam  Vitals reviewed.   Constitutional:       Appearance: Normal appearance.   HENT:      Head: Normocephalic.      Mouth/Throat:      Mouth: Mucous membranes are moist.    Eyes:      Extraocular Movements: Extraocular movements intact.   Cardiovascular:      Rate and Rhythm: Normal rate.      Heart sounds: No murmur heard.  Pulmonary:      Effort: No respiratory distress.      Breath sounds: Normal breath sounds. No wheezing.   Abdominal:      General: There is no distension.      Palpations: Abdomen is soft.      Tenderness: There is no abdominal tenderness.   Musculoskeletal:      Cervical back: Neck supple.      Right lower leg: No edema.      Left lower leg: No edema.   Lymphadenopathy:      Cervical: No cervical adenopathy.   Skin:     General: Skin is dry.      Coloration: Skin is not jaundiced.   Neurological:      General: No focal deficit present.      Mental Status: He is alert and oriented to person, place, and time.      Cranial Nerves: No cranial nerve deficit.   Psychiatric:         Mood and Affect: Mood normal.         Fluids    Intake/Output Summary (Last 24 hours) at 8/20/2024 1911  Last data filed at 8/20/2024 1400  Gross per 24 hour   Intake 777.94 ml   Output 1240 ml   Net -462.06 ml       Laboratory  Recent Labs     08/18/24 2004 08/19/24  0014 08/20/24  0328   WBC 6.6 7.1 12.5*   RBC 5.47 5.13 5.82   HEMOGLOBIN 17.2 16.1 18.3*   HEMATOCRIT 50.3 47.3 53.3*   MCV 92.0 92.2 91.6   MCH 31.4 31.4 31.4   MCHC 34.2 34.0 34.3   RDW 46.3 46.5 46.8   PLATELETCT 201 210 241   MPV 9.9 10.1 9.9     Recent Labs     08/18/24 2004 08/19/24  0305 08/20/24  0328   SODIUM 140 140 139   POTASSIUM 3.8 3.6 3.7   CHLORIDE 103 103 103   CO2 23 23 21   GLUCOSE 174* 130* 122*   BUN 21 16 18   CREATININE 0.98 0.76 0.78   CALCIUM 9.4 8.9 9.8     Recent Labs     08/18/24 2004   APTT 28.3   INR 0.97         Recent Labs     08/19/24  0305   TRIGLYCERIDE 137   HDL 43   *       Imaging  DX-CHEST-PORTABLE (1 VIEW)   Final Result      No acute cardiac or pulmonary abnormalities are identified.      CT-CEREBRAL PERFUSION ANALYSIS   Final Result      1. Cerebral blood flow less than 30%  "possibly representing completed infarct = 0 mL.   2. T Max more than 6 seconds possibly representing combination of completed infarct and ischemia = 0 mL.   3. Mismatched volume possibly representing ischemic brain/penumbra= 0 mL      4.  Please note that this cerebral perfusion study and report is Quantitative and targets supratentorial (cerebral) perfusion for evaluation of large vessel territory acute ischemia/infarction. For example, lacunar infarcts, and brainstem/posterior fossa    ischemia/infarction are not evaluated on this study.  Data acquisition is subject to artifacts which can yield non-anatomically plausible perfusion maps which may be due to motion, bolus timing, signal to noise ratio, or other technical factors.    Perfusion map abnormalities which show non-anatomic distributions are likely artifact.   This study is not \"stand-alone\" and should only be utilized for diagnosis, management/treatment in correlation with CT, CTA, and/or MRI and clinical factors.         CT-CTA NECK WITH & W/O-POST PROCESSING   Final Result      Atherosclerosis without significant stenosis, occlusion, or aneurysm in the neck.      CT-CTA HEAD WITH & W/O-POST PROCESS   Final Result      Atherosclerosis without occlusion or aneurysm.      CT-HEAD W/O   Final Result         1. Atrophy and chronic white matter disease.   2. No definite acute intracranial abnormality.                    Assessment/Plan  * Hypertensive urgency- (present on admission)  Assessment & Plan  S/p Nicardipine infusion   Continue losartan 50mg daily  Continue metoprolol 50mg BID  Serum metanephrines  F/u lipid panel  Continue IV hydralazine 10 mg every 6 hours as needed.      Hyperlipidemia  Assessment & Plan  Allergies to Statins  Try outpatient PCSK9 inhibitor and follow up with primary care  Lifestyle modification    Elevated bilirubin  Assessment & Plan  Tbili 3 - hx elevated bili 1.8-2, had prior ERCP 2012 w/biliary sphincterotomy and CBD stone " extraction  Downtrending - no abd symptoms  He has been tolerating diet.      Atrial flutter (HCC)  Assessment & Plan  Currently control   I am continuing Eliquis.  Monitor for signs of bleeding  Continue metoprolol regimen 50 mg twice daily  Optimize electrolytes  Continue to monitor.    DM (diabetes mellitus) (HCC)- (present on admission)  Assessment & Plan  Holding home sitagliptin - can restart once stable after starting PO diet  Monitor accucheck and cover with SSI   Diabetic diet  Hypoglycemic protocol  Current HbA1c 6.1         VTE prophylaxis:    therapeutic anticoagulation with eliquis 5 mg BID      I have performed a physical exam and reviewed and updated ROS and Plan today (8/20/2024). In review of yesterday's note (8/19/2024), there are no changes except as documented above.

## 2024-08-20 NOTE — PROGRESS NOTES
Monitor Summary:    Rhythm: V-Paced    Rate: 60's    Ectopy: None    ME - NA QRS - 0.07 QT - 0.37

## 2024-08-20 NOTE — CARE PLAN
The patient is Watcher - Medium risk of patient condition declining or worsening    Shift Goals  Clinical Goals: Monitor BP, neuro assessments, transfer  Patient Goals: Go home  Family Goals: Updates    Problem: Skin Integrity  Goal: Skin integrity is maintained or improved  Outcome: Progressing  Note: Assessed skin and ensured that appropriate interventions are in place.      Problem: Fall Risk  Goal: Patient will remain free from falls  Outcome: Progressing  Note: Fall risk interventions in place; no falls this shift. Obtained services of physical sitter as pt demonstrated impulsive behavior.      Problem: Hemodynamics  Goal: Patient's hemodynamics, fluid balance and neurologic status will be stable or improve  Outcome: Progressing  Note: Pt remains hemodynamically stable. Permissive hypertension per NOC hospitalist.      Problem: Pain - Standard  Goal: Alleviation of pain or a reduction in pain to the patient’s comfort goal  Outcome: Progressing  Note: Assessed pain q2h and as needed. Pt denies pain this shift.      Progress made toward(s) clinical / shift goals:  See above.

## 2024-08-20 NOTE — PROGRESS NOTES
Conversation with Andreas became increasingly more random in regards to subject matter throughout the night. At certain points he had periods of expressive aphasia along with difficulty following commands with seconds of delay. Blood pressures also enrique into 180s, 190s.   Meds given per mar  Neurological assessment and NIH completed and relayed to KB Johnson.

## 2024-08-20 NOTE — PROGRESS NOTES
Notified by tele sitter that pt had removed IV from RAC. Cleaned area with alcohol, held pressure, and dressed IV site. Wrapped remaining IV loosely in coban and educated pt not to remove. Asked charge RN for physical sitter for pt as he is becoming increasingly confused.

## 2024-08-20 NOTE — PROGRESS NOTES
4 Eyes Skin Assessment Completed by DERICK Fierro and DERICK Templeton.    Head WDL  Ears Redness and Blanching, scab on L ear, old per pt  Nose WDL  Mouth WDL  Neck WDL  Breast/Chest Rash  Shoulder Blades Redness and Blanching  Spine Redness and Blanching  (R) Arm/Elbow/Hand Redness, Blanching, and Bruising  (L) Arm/Elbow/Hand Redness, Blanching, and Bruising  Abdomen WDL  Groin Redness and Excoriation, talcum powder applied  Scrotum/Coccyx/Buttocks Redness, Blanching, and Excoriation, talcum powder applied  (R) Leg WDL  (L) Leg WDL  (R) Heel/Foot/Toe Redness and Blanching, floated with pillow  (L) Heel/Foot/Toe Redness and Blanching, floated with pillow    Devices In Places ECG, Blood Pressure Cuff, and Pulse Ox    Interventions In Place Pillows, Low Air Loss Mattress, and Heels Loaded W/Pillows    Possible Skin Injury No    Pictures Uploaded Into Epic N/A  Wound Consult Placed N/A  RN Wound Prevention Protocol Ordered No

## 2024-08-20 NOTE — CONSULTS
Hospital Medicine Consultation    Date of Service  8/19/2024    Referring Physician    My Cardona M.D.     Consulting Physician  Chantal Sampson M.D.    Reason for Consultation  Transfer of care    History of Presenting Illness  73 y.o. male with past ministry of atrial fibrillation on Eliquis, hypertension, hyperlipidemia and diabetes mellitus who presented to the hospital on 8/18/2024 with slurred speech.  When EMS arrived patient found to have systolic blood pressure in 200s.  He transferred to [facility] with a stroke alert.  He underwent CT and CT perfusion revealed no acute abnormalities or large vessel occlusion.  Thrombolytic therapy was not offered.  Patient was started on nicardipine infusion and he was admitted to ICU for close monitoring and management.    On August 19, 2024 intensivist Dr. Cardona requested to transfer care to hospitalist service.  I evaluated and examined Mr. Ortega in ICU.  He reported that he is feeling significantly better.  He was eating lunch at the time of evaluation.    Review of Systems  Review of Systems   Constitutional:  Positive for malaise/fatigue. Negative for chills, fever and weight loss.   HENT:  Negative for hearing loss and tinnitus.    Eyes:  Negative for blurred vision, double vision, photophobia and pain.   Respiratory:  Negative for cough, sputum production and shortness of breath.    Cardiovascular:  Negative for chest pain, palpitations, orthopnea and leg swelling.   Gastrointestinal:  Negative for abdominal pain, constipation, diarrhea, nausea and vomiting.   Genitourinary:  Negative for dysuria, frequency and urgency.   Musculoskeletal:  Negative for back pain, joint pain, myalgias and neck pain.   Skin:  Negative for rash.   Neurological:  Negative for dizziness, tingling, tremors, sensory change, speech change, focal weakness and headaches.   Psychiatric/Behavioral:  Negative for hallucinations and substance abuse.     All other systems reviewed and are negative.      Past Medical History   has a past medical history of Arthritis, Backpain, Breath shortness (07/11/2023), Cancer (HCC), Diabetes, Fall, Heart burn (07/11/2023), High cholesterol (07/11/2023), Hypertension, Indigestion, Prostate cancer (HCC), Psychiatric problem (07/11/2023), Renal cancer (HCC), and Skin cancer.    Surgical History   has a past surgical history that includes prostatectomy, radial (2005); other (1984); other orthopedic surgery (1974); appendectomy (1966); gastroscopy with biopsy (1/23/2012); egd w/endoscopic ultrasound (1/23/2012); ercp w/sphincterotomy/papill. (1/23/2012); ercp w/removal calculus (1/23/2012); and pr partial hip replacement (5/16/2023).    Family History  family history includes Cancer in his unknown relative; Diabetes in his unknown relative; Heart Disease in his unknown relative; Hypertension in his unknown relative; Stroke in his unknown relative.    Social History   reports that he quit smoking about 40 years ago. His smoking use included cigarettes. He started smoking about 58 years ago. He has a 36 pack-year smoking history. He has been exposed to tobacco smoke. He quit smokeless tobacco use about 30 years ago.  His smokeless tobacco use included chew and snuff. He reports that he does not drink alcohol and does not use drugs.    Medications  Prior to Admission Medications   Prescriptions Last Dose Informant Patient Reported? Taking?   SITagliptin (JANUVIA) 100 MG Tab 8/18/2024 at unknown Patient Yes Yes   Sig: Take 100 mg by mouth every day.   allopurinol (ZYLOPRIM) 300 MG Tab 8/18/2024 at unknown Patient Yes Yes   Sig: Take 150 mg by mouth every day. .5 tab = 150 mg   apixaban (ELIQUIS) 5mg Tab 8/18/2024 at unknown Patient No Yes   Sig: Take 1 Tablet by mouth 2 times a day.   baclofen (LIORESAL) 10 MG Tab unknown at unknown Patient Yes Yes   Sig: Take 10 mg by mouth 3 times a day.   losartan (COZAAR) 50 MG Tab 8/18/2024 at  unknown Patient Yes Yes   Sig: Take 50 mg by mouth every day.   pregabalin (LYRICA) 150 MG Cap 8/18/2024 at unknown Patient Yes Yes   Sig: Take 150 mg by mouth 2 times a day.      Facility-Administered Medications: None       Allergies  Allergies   Allergen Reactions    Pcn [Penicillins] Anaphylaxis     As a child, throat swelled     Levofloxacin Nausea     Nausea, flushed and headache    Metformin Nausea     Nausea, flushed and headache    Pravastatin Nausea     Nausea,flushed and headache    Rosuvastatin Nausea     Flushed, headache      Simvastatin Nausea     Nausea, flushed and headache    Statins [Hmg-Coa-R Inhibitors] Nausea     Nausea, flushed and headache       Physical Exam  Temp:  [35.7 °C (96.3 °F)-36.8 °C (98.2 °F)] 36.5 °C (97.7 °F)  Pulse:  [69-87] 70  Resp:  [12-49] 18  BP: (127-247)/() 160/96  SpO2:  [87 %-96 %] 95 %    Physical Exam  Vitals reviewed.   Constitutional:       General: He is not in acute distress.     Comments: He is sitting in chair and having lunch   HENT:      Head: Normocephalic and atraumatic. No contusion.      Right Ear: External ear normal.      Left Ear: External ear normal.      Nose: Nose normal.      Mouth/Throat:      Pharynx: No oropharyngeal exudate.   Eyes:      General:         Right eye: No discharge.         Left eye: No discharge.      Pupils: Pupils are equal, round, and reactive to light.   Cardiovascular:      Rate and Rhythm: Normal rate and regular rhythm.      Heart sounds: No murmur heard.     No friction rub. No gallop.   Pulmonary:      Effort: Pulmonary effort is normal.      Breath sounds: No wheezing or rhonchi.   Abdominal:      General: Bowel sounds are normal. There is no distension.      Palpations: Abdomen is soft.      Tenderness: There is no abdominal tenderness. There is no rebound.   Musculoskeletal:         General: No swelling or tenderness. Normal range of motion.      Cervical back: No rigidity. No muscular tenderness.   Skin:      General: Skin is warm and dry.      Coloration: Skin is not jaundiced.   Neurological:      General: No focal deficit present.      Mental Status: He is alert and oriented to person, place, and time.      Cranial Nerves: No cranial nerve deficit.      Sensory: No sensory deficit.      Comments: He is following commands and moving all his extremities   Psychiatric:         Mood and Affect: Mood normal.         Fluids  Date 08/19/24 0700 - 08/20/24 0659   Shift 0568-0665 5190-8535 9725-0599 24 Hour Total   INTAKE   I.V. 492.8   492.8   Shift Total 492.8   492.8   OUTPUT   Urine 850 0  850   Shift Total 850 0  850   Weight (kg) 108.9 108.9 108.9 108.9       Laboratory  Recent Labs     08/18/24 2004 08/19/24  0014   WBC 6.6 7.1   RBC 5.47 5.13   HEMOGLOBIN 17.2 16.1   HEMATOCRIT 50.3 47.3   MCV 92.0 92.2   MCH 31.4 31.4   MCHC 34.2 34.0   RDW 46.3 46.5   PLATELETCT 201 210   MPV 9.9 10.1     Recent Labs     08/18/24 2004 08/19/24  0305   SODIUM 140 140   POTASSIUM 3.8 3.6   CHLORIDE 103 103   CO2 23 23   GLUCOSE 174* 130*   BUN 21 16   CREATININE 0.98 0.76   CALCIUM 9.4 8.9     Recent Labs     08/18/24 2004   APTT 28.3   INR 0.97          Recent Labs     08/19/24  0305   TRIGLYCERIDE 137   HDL 43   *        Imaging  DX-CHEST-PORTABLE (1 VIEW)   Final Result      No acute cardiac or pulmonary abnormalities are identified.      CT-CEREBRAL PERFUSION ANALYSIS   Final Result      1. Cerebral blood flow less than 30% possibly representing completed infarct = 0 mL.   2. T Max more than 6 seconds possibly representing combination of completed infarct and ischemia = 0 mL.   3. Mismatched volume possibly representing ischemic brain/penumbra= 0 mL      4.  Please note that this cerebral perfusion study and report is Quantitative and targets supratentorial (cerebral) perfusion for evaluation of large vessel territory acute ischemia/infarction. For example, lacunar infarcts, and brainstem/posterior fossa     "ischemia/infarction are not evaluated on this study.  Data acquisition is subject to artifacts which can yield non-anatomically plausible perfusion maps which may be due to motion, bolus timing, signal to noise ratio, or other technical factors.    Perfusion map abnormalities which show non-anatomic distributions are likely artifact.   This study is not \"stand-alone\" and should only be utilized for diagnosis, management/treatment in correlation with CT, CTA, and/or MRI and clinical factors.         CT-CTA NECK WITH & W/O-POST PROCESSING   Final Result      Atherosclerosis without significant stenosis, occlusion, or aneurysm in the neck.      CT-CTA HEAD WITH & W/O-POST PROCESS   Final Result      Atherosclerosis without occlusion or aneurysm.      CT-HEAD W/O   Final Result         1. Atrophy and chronic white matter disease.   2. No definite acute intracranial abnormality.                   Assessment/Plan  * Hypertensive urgency- (present on admission)  Assessment & Plan  Nicardipine infusion - wean as tolerated, will plan to normalize BP today with goal <160  Start losartan 50mg daily  Continue metoprolol 50mg BID  Serum metanephrines  F/u lipid panel  Continue IV hydralazine 10 mg every 6 hours as needed.      Hyperlipidemia  Assessment & Plan  Found to hyperlipidemia  If liver enzymes remain stable and bilirubin started to trend down consider initiating statin.    Elevated bilirubin  Assessment & Plan  Tbili 3 - hx elevated bili 1.8-2, had prior ERCP 2012 w/biliary sphincterotomy and CBD stone extraction  Downtrending - no abd symptoms  He has been tolerating diet.      Atrial flutter (HCC)  Assessment & Plan  Currently control   I am continuing Eliquis.  Monitor for signs of bleeding  Continue metoprolol regimen 50 mg twice daily  Optimize electrolytes  Continue to monitor.    DM (diabetes mellitus) (HCC)- (present on admission)  Assessment & Plan  Holding home sitagliptin - can restart once stable after " starting PO diet  Current HbA1c 6.1      Thank you for allow me to participate in patient care.  Hospitalist service will continue to follow this patient.    I reviewed and summarized patient hospitalization in this document.    I discussed plan of care with bedside RN.    I discussed plan of care with intensivist Dr. Cardona.    High complexity medical care due to multiorgan involvement.

## 2024-08-20 NOTE — ASSESSMENT & PLAN NOTE
Allergies to Statins  Try outpatient PCSK9 inhibitor and follow up with primary care  Lifestyle modification

## 2024-08-20 NOTE — PROGRESS NOTES
Monitor Summary  Rhythm: Sinus, partially paced  Ectopy:  Rate: 60-70s BPM  Measurements: -/0.16/-

## 2024-08-20 NOTE — ASSESSMENT & PLAN NOTE
Tbili 3 - hx elevated bili 1.8-2, had prior ERCP 2012 w/biliary sphincterotomy and CBD stone extraction  Downtrending - no abd symptoms  He has been tolerating diet.

## 2024-08-20 NOTE — ASSESSMENT & PLAN NOTE
Currently control   I am continuing Eliquis.  Monitor for signs of bleeding  Continue metoprolol regimen 50 mg twice daily  Optimize electrolytes  Continue to monitor.

## 2024-08-20 NOTE — CARE PLAN
The patient is Watcher - Medium risk of patient condition declining or worsening    Shift Goals  Clinical Goals: Maintain BP  Patient Goals: Go home  Family Goals: updates    Progress made toward(s) clinical / shift goals:        Problem: Knowledge Deficit - Standard  Goal: Patient and family/care givers will demonstrate understanding of plan of care, disease process/condition, diagnostic tests and medications  Outcome: Progressing     Problem: Skin Integrity  Goal: Skin integrity is maintained or improved  Outcome: Progressing     Problem: Fall Risk  Goal: Patient will remain free from falls  Outcome: Progressing     Problem: Psychosocial  Goal: Patient's level of anxiety will decrease  Outcome: Progressing  Goal: Patient's ability to verbalize feelings about condition will improve  Outcome: Progressing     Problem: Hemodynamics  Goal: Patient's hemodynamics, fluid balance and neurologic status will be stable or improve  Outcome: Progressing     Problem: Respiratory  Goal: Patient will achieve/maintain optimum respiratory ventilation and gas exchange  Outcome: Progressing     Problem: Risk for Aspiration  Goal: Patient's risk for aspiration will be absent or decrease  Outcome: Progressing         Problem: Pain - Standard  Goal: Alleviation of pain or a reduction in pain to the patient’s comfort goal  Outcome: Progressing     Problem: Urinary - Renal Perfusion  Goal: Ability to achieve and maintain adequate renal perfusion and functioning will improve  Outcome: Progressing     Problem: Venous Thromboembolism (VTE) Prevention  Goal: The patient will remain free from venous thromboembolism (VTE)  Outcome: Progressing     Problem: Nutrition  Goal: Patient's nutritional and fluid intake will be adequate or improve  Outcome: Progressing     Problem: Urinary Elimination  Goal: Establish and maintain regular urinary output  Outcome: Progressing

## 2024-08-21 VITALS
SYSTOLIC BLOOD PRESSURE: 155 MMHG | RESPIRATION RATE: 18 BRPM | BODY MASS INDEX: 27.7 KG/M2 | DIASTOLIC BLOOD PRESSURE: 104 MMHG | WEIGHT: 215.83 LBS | HEART RATE: 74 BPM | OXYGEN SATURATION: 95 % | TEMPERATURE: 97.3 F | HEIGHT: 74 IN

## 2024-08-21 LAB
BILIRUB CONJ SERPL-MCNC: 0.3 MG/DL (ref 0.1–0.5)
GLUCOSE BLD STRIP.AUTO-MCNC: 104 MG/DL (ref 65–99)
GLUCOSE BLD STRIP.AUTO-MCNC: 119 MG/DL (ref 65–99)
GLUCOSE BLD STRIP.AUTO-MCNC: 127 MG/DL (ref 65–99)

## 2024-08-21 PROCEDURE — A9270 NON-COVERED ITEM OR SERVICE: HCPCS

## 2024-08-21 PROCEDURE — A9270 NON-COVERED ITEM OR SERVICE: HCPCS | Performed by: INTERNAL MEDICINE

## 2024-08-21 PROCEDURE — 99239 HOSP IP/OBS DSCHRG MGMT >30: CPT | Mod: GC | Performed by: INTERNAL MEDICINE

## 2024-08-21 PROCEDURE — A9270 NON-COVERED ITEM OR SERVICE: HCPCS | Performed by: STUDENT IN AN ORGANIZED HEALTH CARE EDUCATION/TRAINING PROGRAM

## 2024-08-21 PROCEDURE — 700102 HCHG RX REV CODE 250 W/ 637 OVERRIDE(OP)

## 2024-08-21 PROCEDURE — 700102 HCHG RX REV CODE 250 W/ 637 OVERRIDE(OP): Performed by: INTERNAL MEDICINE

## 2024-08-21 PROCEDURE — 82962 GLUCOSE BLOOD TEST: CPT

## 2024-08-21 PROCEDURE — 700102 HCHG RX REV CODE 250 W/ 637 OVERRIDE(OP): Performed by: STUDENT IN AN ORGANIZED HEALTH CARE EDUCATION/TRAINING PROGRAM

## 2024-08-21 RX ORDER — LIDOCAINE 50 MG/G
1 PATCH TOPICAL EVERY 24 HOURS
COMMUNITY

## 2024-08-21 RX ORDER — HYDROCHLOROTHIAZIDE 12.5 MG/1
12.5 CAPSULE ORAL DAILY
COMMUNITY

## 2024-08-21 RX ORDER — HYDROCHLOROTHIAZIDE 25 MG/1
12.5 TABLET ORAL
Status: DISCONTINUED | OUTPATIENT
Start: 2024-08-21 | End: 2024-08-21 | Stop reason: HOSPADM

## 2024-08-21 RX ORDER — OXYCODONE AND ACETAMINOPHEN 10; 325 MG/1; MG/1
1 TABLET ORAL EVERY 4 HOURS PRN
COMMUNITY

## 2024-08-21 RX ORDER — METOPROLOL TARTRATE 50 MG
50 TABLET ORAL 2 TIMES DAILY
Qty: 60 TABLET | Refills: 0 | Status: SHIPPED | OUTPATIENT
Start: 2024-08-21 | End: 2024-08-21

## 2024-08-21 RX ORDER — METOPROLOL TARTRATE 50 MG
50 TABLET ORAL 2 TIMES DAILY
COMMUNITY

## 2024-08-21 RX ORDER — HYDROCHLOROTHIAZIDE 12.5 MG/1
12.5 TABLET ORAL DAILY
Qty: 30 TABLET | Refills: 0 | Status: SHIPPED | OUTPATIENT
Start: 2024-08-21 | End: 2024-08-21

## 2024-08-21 RX ADMIN — METOPROLOL TARTRATE 50 MG: 50 TABLET, FILM COATED ORAL at 04:51

## 2024-08-21 RX ADMIN — APIXABAN 5 MG: 5 TABLET, FILM COATED ORAL at 04:52

## 2024-08-21 RX ADMIN — HYDROCHLOROTHIAZIDE 12.5 MG: 25 TABLET ORAL at 14:18

## 2024-08-21 RX ADMIN — LOSARTAN POTASSIUM 50 MG: 50 TABLET, FILM COATED ORAL at 04:51

## 2024-08-21 ASSESSMENT — CHA2DS2 SCORE
HYPERTENSION: YES
SEX: MALE
AGE 75 OR GREATER: YES
CHF OR LEFT VENTRICULAR DYSFUNCTION: NO
PRIOR STROKE OR TIA OR THROMBOEMBOLISM: YES
CHA2DS2 VASC SCORE: 6
AGE 65 TO 74: NO
VASCULAR DISEASE: NO
DIABETES: YES

## 2024-08-21 ASSESSMENT — COGNITIVE AND FUNCTIONAL STATUS - GENERAL
SUGGESTED CMS G CODE MODIFIER DAILY ACTIVITY: CI
MOVING TO AND FROM BED TO CHAIR: A LITTLE
TURNING FROM BACK TO SIDE WHILE IN FLAT BAD: A LITTLE
DRESSING REGULAR LOWER BODY CLOTHING: A LITTLE
DAILY ACTIVITIY SCORE: 23
MOBILITY SCORE: 20
CLIMB 3 TO 5 STEPS WITH RAILING: A LITTLE
WALKING IN HOSPITAL ROOM: A LITTLE
SUGGESTED CMS G CODE MODIFIER MOBILITY: CJ

## 2024-08-21 ASSESSMENT — PAIN DESCRIPTION - PAIN TYPE: TYPE: ACUTE PAIN

## 2024-08-21 ASSESSMENT — FIBROSIS 4 INDEX: FIB4 SCORE: 1.95

## 2024-08-21 NOTE — PROGRESS NOTES
Patient wife at bedside. Educated patient and wife on importance of recording and tracking his blood pressure. IV removed. All belongings with patient. Home meds with patient. Discharge paperwork reviewing and with patient.

## 2024-08-21 NOTE — PROGRESS NOTES
Monitor Summary  Rhythm: A-flutter  Rate: 70-90  Ectopy: PVC  Measurements: -/.15/-  ---12 hr Chart Review---

## 2024-08-21 NOTE — CARE PLAN
The patient is Stable - Low risk of patient condition declining or worsening    Shift Goals  Clinical Goals: safety, monitor blood pressure  Patient Goals: watch baseball  Family Goals: golden    Progress made toward(s) clinical / shift goals:    Problem: Knowledge Deficit - Standard  Goal: Patient and family/care givers will demonstrate understanding of plan of care, disease process/condition, diagnostic tests and medications  Description: Target End Date:  1-3 days or as soon as patient condition allows    Document in Patient Education    1.  Patient and family/caregiver oriented to unit, equipment, visitation policy and means for communicating concern  2.  Complete/review Learning Assessment  3.  Assess knowledge level of disease process/condition, treatment plan, diagnostic tests and medications  4.  Explain disease process/condition, treatment plan, diagnostic tests and medications  Outcome: Progressing  Note: Pt actively participates in POC. Pt and board updated, all questions and concerns answered. Pt encouraged to voice all questions and concerns.Patient wife called and updated.     Problem: Fall Risk  Goal: Patient will remain free from falls  Description: Target End Date:  Prior to discharge or change in level of care    Document interventions on the Isabela Sanchez Fall Risk Assessment    1.  Assess for fall risk factors  2.  Implement fall precautions  Outcome: Progressing  Note: Safety and fall education given. All fall precautions are in place with belongings at bedside table. Needs are tended to. Educated to use call light for assistance. Pt verbalized understanding. Patient went on walk with charge RN.

## 2024-08-21 NOTE — CARE PLAN
The patient is Stable - Low risk of patient condition declining or worsening    Shift Goals  Clinical Goals: safety, VSS  Patient Goals: sleep  Family Goals: not present      Problem: Skin Integrity  Goal: Skin integrity is maintained or improved  Outcome: Progressing     Problem: Fall Risk  Goal: Patient will remain free from falls  Outcome: Progressing     Problem: Hemodynamics  Goal: Patient's hemodynamics, fluid balance and neurologic status will be stable or improve  Outcome: Progressing

## 2024-08-21 NOTE — DISCHARGE SUMMARY
[facility] Discharge Summary    Attending: Dolores Jacques M.d.  Senior Resident: Dr. Peck  Intern:  Dr. Finney  Contact Number: [phone]    CHIEF COMPLAINT ON ADMISSION  Chief Complaint   Patient presents with    Possible Stroke     Brought by ems due to stroke like symptoms    Symptoms: + CONFUSION, SLURRED SPEECH    Last Known Well: (?)  Onset of symptoms: 0000 AM  NIH: 2  GCS: 14/15 (Alert and Oriented x 3 - person, place, situation), disoriented  x time    Contraptions: IV gauge 18 Rt AC  Interventions given: Blood Glucose en route: 168 mg/dL    Evaluated by the ERP  Sent to CT scan  Transferred to pt's room and endorsed to the assigned RN    Hypertension     Blood pressure en route: 200/120 mmHg  + Nausea and Vomiting    N/V       Reason for Admission  stroke     Admission Date  8/18/2024    CODE STATUS  Full Code    HPI & HOSPITAL COURSE  This is a 73 y.o. male with pmh htn, diabetes, hld, afib on apixaban presented on 8/18/24 with slurred speech which started earlier that evening when patient was watching TV. BP check with EMS showed SBP in the 200s. Patient was immediately treated with a nicardpine gtt which brought his blood pressure down and resolved his symptoms which were negative for the remainder of this hospitalization. He was also taken to [facility] for stroke work up where CTA head/neck, CT head, and cerebral perfusion studies were negative. MRI was not performed as patient has a non-compatible pacemaker (rates appear to be set In the 70s).    Etiology of the hypertensive emergency episode unclear but perhaps related to the patients excessive afrin use. Patient reports this is the first time something like this has ever happened. Cardiac medications include losartan 50 and metop 50 bid. [facility]    Med rec listed: allopurinol 150 mg daily, apixaban, baclofen, losartan, pregabalin 300 mg, sitagliptin 100 mg, hydrochlorothiazide 12.5 mg daily, metoprolol 50  mg twice daily, Percocet 10 mg prn.    Pressures were therefore optimized with addition of home HCTZ. Patient will need to follow up with PCP for blood pressure management and further workup of hypertensive emergency if not due to afrin usage. For now, patient was counseled on reducing his use of the nasal spray.    Of note patient has history of mildly elevated bilirubin (hx of ERCP 2012 w/ biliary sphincterotomy and CBD stone extraction). T bili at 3 during this hospital admission with D bili of 0.3. Fortunately no obstructive process ongoing but patient will need close outpatient GI follow up.    Therefore, he is discharged in good and stable condition without any rehab needs to home with close outpatient follow-up.    The patient recovered much more quickly than anticipated on admission.    Discharge Date  8/21    Physical Exam on Day of Discharge  Physical Exam  Constitutional:       Appearance: Normal appearance. He is normal weight.   HENT:      Head: Normocephalic and atraumatic.      Right Ear: Tympanic membrane, ear canal and external ear normal.      Left Ear: Tympanic membrane, ear canal and external ear normal.      Nose: Nose normal.      Mouth/Throat:      Mouth: Mucous membranes are moist.      Pharynx: Oropharynx is clear.   Eyes:      Extraocular Movements: Extraocular movements intact.      Conjunctiva/sclera: Conjunctivae normal.      Pupils: Pupils are equal, round, and reactive to light.   Cardiovascular:      Rate and Rhythm: Normal rate and regular rhythm.      Pulses: Normal pulses.      Heart sounds: Normal heart sounds.   Pulmonary:      Effort: Pulmonary effort is normal.      Breath sounds: Normal breath sounds.   Abdominal:      General: Abdomen is flat. Bowel sounds are normal.      Palpations: Abdomen is soft.   Musculoskeletal:         General: Normal range of motion.      Cervical back: Normal range of motion.   Skin:     General: Skin is warm.      Capillary Refill: Capillary  refill takes less than 2 seconds.   Neurological:      Mental Status: He is alert and oriented to person, place, and time.   Psychiatric:         Mood and Affect: Mood normal.         Behavior: Behavior normal.         Thought Content: Thought content normal.         Judgment: Judgment normal.         FOLLOW UP ITEMS POST DISCHARGE  Follow up with GI for monitoring of elevated I-bili  Follow up with PCP for blood pressure monitoring and management of chronic conditions    DISCHARGE DIAGNOSES  Principal Problem:    Hypertensive urgency (POA: Yes)  Active Problems:    DM (diabetes mellitus) (HCC) (POA: Yes)    Atrial flutter (HCC) (POA: Unknown)    Elevated bilirubin (POA: Unknown)    Hyperlipidemia (POA: Unknown)  Resolved Problems:    * No resolved hospital problems. *      FOLLOW UP  No future appointments.  No follow-up provider specified.    MEDICATIONS ON DISCHARGE     Medication List        START taking these medications        Instructions   hydroCHLOROthiazide 12.5 MG tablet   Take 1 Tablet by mouth every day.  Dose: 12.5 mg     metoprolol tartrate 50 MG Tabs  Commonly known as: Lopressor   Take 1 Tablet by mouth 2 times a day.  Dose: 50 mg            CONTINUE taking these medications        Instructions   allopurinol 300 MG Tabs  Commonly known as: Zyloprim   Take 150 mg by mouth every day. .5 tab = 150 mg  Dose: 150 mg     apixaban 5mg Tabs  Commonly known as: Eliquis   Doctor's comments: Please release to [facility]  Take 1 Tablet by mouth 2 times a day.  Dose: 5 mg     baclofen 10 MG Tabs  Commonly known as: Lioresal   Take 10 mg by mouth 3 times a day.  Dose: 10 mg     losartan 50 MG Tabs  Commonly known as: Cozaar   Take 50 mg by mouth every day.  Dose: 50 mg     pregabalin 150 MG Caps  Commonly known as: Lyrica   Take 150 mg by mouth 2 times a day.  Dose: 150 mg     SITagliptin 100 MG Tabs  Commonly known as: Januvia   Take 100 mg by mouth every day.  Dose: 100 mg               Allergies  Allergies   Allergen Reactions    Pcn [Penicillins] Anaphylaxis     As a child, throat swelled     Levofloxacin Nausea     Nausea, flushed and headache    Metformin Nausea     Nausea, flushed and headache    Pravastatin Nausea     Nausea,flushed and headache    Rosuvastatin Nausea     Flushed, headache      Simvastatin Nausea     Nausea, flushed and headache    Statins [Hmg-Coa-R Inhibitors] Nausea     Nausea, flushed and headache       DIET  Orders Placed This Encounter   Procedures    Diet Order Diet: Consistent CHO (Diabetic)     Standing Status:   Standing     Number of Occurrences:   1     Order Specific Question:   Diet:     Answer:   Consistent CHO (Diabetic) [4]       ACTIVITY  As tolerated.  Weight bearing as tolerated    CONSULTATIONS  Neurology  Critical care    PROCEDURES    LABORATORY  Lab Results   Component Value Date    SODIUM 139 08/20/2024    POTASSIUM 3.7 08/20/2024    CHLORIDE 103 08/20/2024    CO2 21 08/20/2024    GLUCOSE 122 (H) 08/20/2024    BUN 18 08/20/2024    CREATININE 0.78 08/20/2024        Lab Results   Component Value Date    WBC 12.5 (H) 08/20/2024    HEMOGLOBIN 18.3 (H) 08/20/2024    HEMATOCRIT 53.3 (H) 08/20/2024    PLATELETCT 241 08/20/2024        Total time of the discharge process exceeds 30 minutes.

## 2024-08-21 NOTE — PROGRESS NOTES
Assumed care of patient and received report from Danielle SEPULVEDA. Assessment completed.Pt A&Ox 3. Respirations are even and unlabored on room air. Tele monitor in place, call light and belongings are within reach. POC updated. Pt educated on room and call light, pt verbalized understanding. Needs met.

## 2024-08-21 NOTE — PROGRESS NOTES
4 Eyes Skin Assessment Completed by DERICK Santos and DERICK Nance.    Head Scab  Ears WDL  Nose WDL  Mouth WDL  Neck WDL  Breast/Chest WDL  Shoulder Blades WDL  Spine WDL  (R) Arm/Elbow/Hand Bruising and Abrasion  (L) Arm/Elbow/Hand Bruising and Abrasion  Abdomen Scar  Groin WDL  Scrotum/Coccyx/Buttocks Redness and Blanching  (R) Leg Scar and Abrasion  (L) Leg WDL  (R) Heel/Foot/Toe Redness and Blanching  (L) Heel/Foot/Toe Redness and Blanching          Devices In Places Tele Box, Blood Pressure Cuff, and Pulse Ox      Interventions In Place Pillows and Heels Loaded W/Pillows    Possible Skin Injury Yes    Pictures Uploaded Into Epic Yes  Wound Consult Placed N/A  RN Wound Prevention Protocol Ordered Yes

## 2024-08-21 NOTE — PROGRESS NOTES
Med rec updated and completed.     Pt unaware of current medication list. Pt wife able to confirm medication list obtained by pharmacy at [facility].     Wife states that pt was recently started on Metoprolol tartrate 50 mg BID. Wife also states that pt was told to stop HydroCHLOROthiazide 12.5 mg by a provider but he does better when on this medication. She was unaware of the exact reason to hold this medication but is agreeable to restart if needed.     Percocet 10 mg 7 day supply new medication has not taken, planning to  on after DC from hospital.

## 2024-08-22 ENCOUNTER — APPOINTMENT (OUTPATIENT)
Dept: RADIOLOGY | Facility: MEDICAL CENTER | Age: 74
End: 2024-08-22
Attending: EMERGENCY MEDICINE
Payer: COMMERCIAL

## 2024-08-22 ENCOUNTER — HOSPITAL ENCOUNTER (EMERGENCY)
Facility: MEDICAL CENTER | Age: 74
End: 2024-08-22
Attending: EMERGENCY MEDICINE
Payer: COMMERCIAL

## 2024-08-22 VITALS
DIASTOLIC BLOOD PRESSURE: 82 MMHG | TEMPERATURE: 98.2 F | BODY MASS INDEX: 27.44 KG/M2 | RESPIRATION RATE: 18 BRPM | SYSTOLIC BLOOD PRESSURE: 153 MMHG | HEIGHT: 74 IN | HEART RATE: 70 BPM | OXYGEN SATURATION: 91 % | WEIGHT: 213.85 LBS

## 2024-08-22 DIAGNOSIS — K59.00 CONSTIPATION, UNSPECIFIED CONSTIPATION TYPE: ICD-10-CM

## 2024-08-22 DIAGNOSIS — E80.6 HYPERBILIRUBINEMIA: ICD-10-CM

## 2024-08-22 DIAGNOSIS — R10.9 ACUTE ABDOMINAL PAIN: ICD-10-CM

## 2024-08-22 LAB
ALBUMIN SERPL BCP-MCNC: 4.2 G/DL (ref 3.2–4.9)
ALBUMIN/GLOB SERPL: 1.8 G/DL
ALP SERPL-CCNC: 83 U/L (ref 30–99)
ALT SERPL-CCNC: 22 U/L (ref 2–50)
ANION GAP SERPL CALC-SCNC: 15 MMOL/L (ref 7–16)
AST SERPL-CCNC: 36 U/L (ref 12–45)
BASOPHILS # BLD AUTO: 0.8 % (ref 0–1.8)
BASOPHILS # BLD: 0.09 K/UL (ref 0–0.12)
BILIRUB SERPL-MCNC: 4.6 MG/DL (ref 0.1–1.5)
BUN SERPL-MCNC: 32 MG/DL (ref 8–22)
CALCIUM ALBUM COR SERPL-MCNC: 9.4 MG/DL (ref 8.5–10.5)
CALCIUM SERPL-MCNC: 9.6 MG/DL (ref 8.5–10.5)
CHLORIDE SERPL-SCNC: 97 MMOL/L (ref 96–112)
CO2 SERPL-SCNC: 21 MMOL/L (ref 20–33)
CREAT SERPL-MCNC: 1.22 MG/DL (ref 0.5–1.4)
EKG IMPRESSION: NORMAL
EKG IMPRESSION: NORMAL
EOSINOPHIL # BLD AUTO: 0.08 K/UL (ref 0–0.51)
EOSINOPHIL NFR BLD: 0.7 % (ref 0–6.9)
ERYTHROCYTE [DISTWIDTH] IN BLOOD BY AUTOMATED COUNT: 46.2 FL (ref 35.9–50)
ETHANOL BLD-MCNC: <10.1 MG/DL
GFR SERPLBLD CREATININE-BSD FMLA CKD-EPI: 62 ML/MIN/1.73 M 2
GLOBULIN SER CALC-MCNC: 2.3 G/DL (ref 1.9–3.5)
GLUCOSE SERPL-MCNC: 226 MG/DL (ref 65–99)
HCT VFR BLD AUTO: 52.5 % (ref 42–52)
HGB BLD-MCNC: 17.8 G/DL (ref 14–18)
IMM GRANULOCYTES # BLD AUTO: 0.05 K/UL (ref 0–0.11)
IMM GRANULOCYTES NFR BLD AUTO: 0.4 % (ref 0–0.9)
LACTATE SERPL-SCNC: 1.5 MMOL/L (ref 0.5–2)
LYMPHOCYTES # BLD AUTO: 3.67 K/UL (ref 1–4.8)
LYMPHOCYTES NFR BLD: 31 % (ref 22–41)
MCH RBC QN AUTO: 31.4 PG (ref 27–33)
MCHC RBC AUTO-ENTMCNC: 33.9 G/DL (ref 32.3–36.5)
MCV RBC AUTO: 92.6 FL (ref 81.4–97.8)
MONOCYTES # BLD AUTO: 1.41 K/UL (ref 0–0.85)
MONOCYTES NFR BLD AUTO: 11.9 % (ref 0–13.4)
NEUTROPHILS # BLD AUTO: 6.54 K/UL (ref 1.82–7.42)
NEUTROPHILS NFR BLD: 55.2 % (ref 44–72)
NRBC # BLD AUTO: 0 K/UL
NRBC BLD-RTO: 0 /100 WBC (ref 0–0.2)
PLATELET # BLD AUTO: 254 K/UL (ref 164–446)
PMV BLD AUTO: 10.1 FL (ref 9–12.9)
POTASSIUM SERPL-SCNC: 3.4 MMOL/L (ref 3.6–5.5)
PROT SERPL-MCNC: 6.5 G/DL (ref 6–8.2)
RBC # BLD AUTO: 5.67 M/UL (ref 4.7–6.1)
SODIUM SERPL-SCNC: 133 MMOL/L (ref 135–145)
WBC # BLD AUTO: 11.8 K/UL (ref 4.8–10.8)

## 2024-08-22 PROCEDURE — 36415 COLL VENOUS BLD VENIPUNCTURE: CPT

## 2024-08-22 PROCEDURE — 93005 ELECTROCARDIOGRAM TRACING: CPT

## 2024-08-22 PROCEDURE — 700111 HCHG RX REV CODE 636 W/ 250 OVERRIDE (IP): Performed by: EMERGENCY MEDICINE

## 2024-08-22 PROCEDURE — 700117 HCHG RX CONTRAST REV CODE 255: Performed by: EMERGENCY MEDICINE

## 2024-08-22 PROCEDURE — 96374 THER/PROPH/DIAG INJ IV PUSH: CPT

## 2024-08-22 PROCEDURE — 83605 ASSAY OF LACTIC ACID: CPT

## 2024-08-22 PROCEDURE — 700101 HCHG RX REV CODE 250: Performed by: EMERGENCY MEDICINE

## 2024-08-22 PROCEDURE — 85025 COMPLETE CBC W/AUTO DIFF WBC: CPT

## 2024-08-22 PROCEDURE — 93005 ELECTROCARDIOGRAM TRACING: CPT | Mod: 76 | Performed by: EMERGENCY MEDICINE

## 2024-08-22 PROCEDURE — 82077 ASSAY SPEC XCP UR&BREATH IA: CPT

## 2024-08-22 PROCEDURE — 99285 EMERGENCY DEPT VISIT HI MDM: CPT

## 2024-08-22 PROCEDURE — 71045 X-RAY EXAM CHEST 1 VIEW: CPT

## 2024-08-22 PROCEDURE — 70450 CT HEAD/BRAIN W/O DYE: CPT

## 2024-08-22 PROCEDURE — 71275 CT ANGIOGRAPHY CHEST: CPT

## 2024-08-22 PROCEDURE — 80053 COMPREHEN METABOLIC PANEL: CPT

## 2024-08-22 RX ORDER — SODIUM PHOSPHATE,MONO-DIBASIC 19G-7G/118
1 ENEMA (ML) RECTAL ONCE
Status: COMPLETED | OUTPATIENT
Start: 2024-08-22 | End: 2024-08-22

## 2024-08-22 RX ORDER — AMOXICILLIN 250 MG
1 CAPSULE ORAL DAILY
Qty: 30 TABLET | Refills: 0 | Status: SHIPPED | OUTPATIENT
Start: 2024-08-22

## 2024-08-22 RX ORDER — HYDROMORPHONE HYDROCHLORIDE 1 MG/ML
1 INJECTION, SOLUTION INTRAMUSCULAR; INTRAVENOUS; SUBCUTANEOUS ONCE
Status: COMPLETED | OUTPATIENT
Start: 2024-08-22 | End: 2024-08-22

## 2024-08-22 RX ADMIN — SODIUM PHOSPHATE 1 ENEMA: 7; 19 ENEMA RECTAL at 05:43

## 2024-08-22 RX ADMIN — IOHEXOL 100 ML: 350 INJECTION, SOLUTION INTRAVENOUS at 05:45

## 2024-08-22 RX ADMIN — HYDROMORPHONE HYDROCHLORIDE 1 MG: 1 INJECTION, SOLUTION INTRAMUSCULAR; INTRAVENOUS; SUBCUTANEOUS at 05:08

## 2024-08-22 ASSESSMENT — FIBROSIS 4 INDEX: FIB4 SCORE: 1.95

## 2024-08-22 NOTE — ED NOTES
Patient up to bedside commode. Patient then ambulates with walker with standby assist. Patient reports he has a walker at home. RN educated patient need to use walker at home.

## 2024-08-22 NOTE — ED NOTES
Patient incontinent with stool, has small episode, thin in consistency. Provided hygiene care with 1 staff assist.

## 2024-08-22 NOTE — ED NOTES
Patient Wife Ileana Ortega (Spouse) at [phone] call to update plan of care, wife Ileana stated she has severe back pain and has surgery planned this week, she can't drive from [place] [employer], she further stated she will try to contact close friend and will call back after making necessary arrangements

## 2024-08-22 NOTE — ED NOTES
Patient bedside report given to DERICK Templeton . Pt Alert , respirations even and unlabored, on room air . Call light in reach, Fall risk interventions in place. Bed alarm in place

## 2024-08-22 NOTE — ED TRIAGE NOTES
"73 y.o. male Andreas Huerta Knee    Chief Complaint   Patient presents with    ALOC    Hip Pain     RIGHT    BIB EMS to [facility]   Picked up from home  EMS called by wife    Patient presented to ED with complaint of altered mentation and right hip pain, as per wife she woke up and found patient in chair screaming out of pain, pointing his right hip, as per wife patient is AAO X 4 as baseline but was confused, patient disoriented x 4, unable to gave any history related to situation. Wife unsure if patient fell or not. Respirations even and unlabored on room air , afebrile at this time. Patient was admitted on 08/18/2024 with stroke like symptoms, negative for stroke work up, but treated in ICU for hypertensive urgency.      ED RN protocol initiated for ALOC       Medications given en route:   150 mcg of fentanyl IV.    BP (!) 180/78   Pulse 69   Temp 36.8 °C (98.2 °F) (Temporal)   Resp 18   Ht 1.89 m (6' 2.4\")   Wt 97 kg (213 lb 13.5 oz)   SpO2 89%   BMI 27.16 kg/m²     Past Medical History:   Diagnosis Date    Arthritis     hands, knees, shoulders    Backpain     Breath shortness 07/11/2023    on exertion    Cancer (HCC)     prostectomy, R kidney removal, skin    Diabetes     borderline    Fall     3 days ago    Heart burn 07/11/2023    takes Pepsid    High cholesterol 07/11/2023    unable to tolerate statins    Hypertension     Indigestion     Prostate cancer (HCC)     Psychiatric problem 07/11/2023    situational anxiety    Renal cancer (HCC)     Skin cancer        Past Surgical History:   Procedure Laterality Date    PB PARTIAL HIP REPLACEMENT  5/16/2023    Procedure: HEMIARTHROPLASTY, HIP;  Surgeon: Denver Carrillo M.D.;  Location: [facility];  Service: Orthopedics    GASTROSCOPY WITH BIOPSY  1/23/2012    Performed by OBDULIO ALEJANDRA at [facility] ORS    EGD W/ENDOSCOPIC ULTRASOUND  1/23/2012    Performed by OBDULIO ALEJANDRA at [facility] ORS    ERCP W/SPHINCTEROTOMY/PAPILL.  " 1/23/2012    Performed by OBDULIO ALEJANDRA at SURGERY [facility] ORS    ERCP W/REMOVAL CALCULUS  1/23/2012    Performed by OBDULIO ALEJANDRA at SURGERY [facility]    PROSTATECTOMY, RADIAL  2005    OTHER  1984    Right kidney removed    OTHER ORTHOPEDIC SURGERY  1974    R knee surgery, repair    APPENDECTOMY  1966

## 2024-08-22 NOTE — ED PROVIDER NOTES
ED Provider Note    CHIEF COMPLAINT  Chief Complaint   Patient presents with    ALOC    Hip Pain     RIGHT        EXTERNAL RECORDS REVIEWED  Inpatient Notes hospitalization 8/18/2024, patient with history of A-fib on apixaban, diabetes, hypertension who was admitted for slurred speech, CTA head and neck were unremarkable, MRI was unable to be completed as patient has noncompatible pacemaker, SBP was significantly elevated and patient was treated for hypertensive emergency.  Patient's neurologic symptoms improved and patient was discharged in good condition    HPI/ROS  LIMITATION TO HISTORY   Select: : None      Andreas Ortega is a 73 y.o. male who presents with chief complaint of acute onset severe back pain rating into his pelvis.  Patient reports back pain started spontaneously and woke him from sleep.  He reports it is severe rating into his abdomen and pelvis.  He reports he has never had any pain like this before.  He apparently was confused by EMS reports the patient is alert and oriented at this point.  Patient denies any fall.  He is adamant that he did not fall or strike his head.  Patient is on apixaban for history of atrial fibrillation, he has a pacemaker.  Patient also with a longstanding history of hypertension.    PAST MEDICAL HISTORY   has a past medical history of Arthritis, Backpain, Breath shortness (07/11/2023), Cancer (HCC), Diabetes, Fall, Heart burn (07/11/2023), High cholesterol (07/11/2023), Hypertension, Indigestion, Prostate cancer (HCC), Psychiatric problem (07/11/2023), Renal cancer (HCC), and Skin cancer.    SURGICAL HISTORY   has a past surgical history that includes prostatectomy, radial (2005); other (1984); other orthopedic surgery (1974); appendectomy (1966); gastroscopy with biopsy (1/23/2012); egd w/endoscopic ultrasound (1/23/2012); ercp w/sphincterotomy/papill. (1/23/2012); ercp w/removal calculus (1/23/2012); and partial hip replacement (5/16/2023).    FAMILY  "HISTORY  Family History   Problem Relation Age of Onset    Cancer Unknown     Diabetes Unknown     Hypertension Unknown     Heart Disease Unknown     Stroke Unknown        SOCIAL HISTORY  Social History     Tobacco Use    Smoking status: Former     Current packs/day: 0.00     Average packs/day: 2.0 packs/day for 18.0 years (36.0 ttl pk-yrs)     Types: Cigarettes     Start date:      Quit date:      Years since quittin.6     Passive exposure: Past    Smokeless tobacco: Former     Types: Chew, Snuff     Quit date:     Tobacco comments:     Quit in    Vaping Use    Vaping status: Never Used   Substance and Sexual Activity    Alcohol use: No    Drug use: No    Sexual activity: Not on file       CURRENT MEDICATIONS  Home Medications    **Home medications have not yet been reviewed for this encounter**       Audit from Redirected Encounters    **Home medications have not yet been reviewed for this encounter**         ALLERGIES  Allergies   Allergen Reactions    Pcn [Penicillins] Anaphylaxis     As a child, throat swelled     Levofloxacin Nausea     Nausea, flushed and headache    Metformin Nausea     Nausea, flushed and headache    Pravastatin Nausea     Nausea,flushed and headache    Rosuvastatin Nausea     Flushed, headache      Simvastatin Nausea     Nausea, flushed and headache    Statins [Hmg-Coa-R Inhibitors] Nausea     Nausea, flushed and headache       PHYSICAL EXAM  VITAL SIGNS: BP (!) 180/78   Pulse 69   Temp 36.8 °C (98.2 °F) (Temporal)   Resp 18   Ht 1.89 m (6' 2.4\")   Wt 97 kg (213 lb 13.5 oz)   SpO2 89%   BMI 27.16 kg/m²    Physical Exam  Constitutional:       Comments: Uncomfortable   HENT:      Head: Normocephalic.      Right Ear: Tympanic membrane normal.      Left Ear: Tympanic membrane normal.      Nose: Nose normal.      Mouth/Throat:      Mouth: Mucous membranes are moist.   Eyes:      Extraocular Movements: Extraocular movements intact.      Pupils: Pupils are equal, " round, and reactive to light.   Cardiovascular:      Rate and Rhythm: Normal rate and regular rhythm.   Pulmonary:      Effort: Pulmonary effort is normal. No respiratory distress.      Breath sounds: Normal breath sounds. No stridor. No wheezing or rales.   Chest:      Chest wall: No tenderness.   Abdominal:      General: Abdomen is flat. There is no distension.      Palpations: Abdomen is soft. There is no mass.      Tenderness: There is abdominal tenderness.      Comments: Lower abdominal tenderness without guarding or rebound   Musculoskeletal:      Cervical back: Normal range of motion.      Comments: No specific tenderness of the thoracic or lumbar spine.  No associated flank ecchymosis or periumbilical ecchymosis.  Full range of motion of bilateral hips without any significant pain evoked on logroll or axial loading.  No evidence of trauma.  Distal pulses are 2+ and equal bilaterally.   Skin:     General: Skin is warm.      Capillary Refill: Capillary refill takes less than 2 seconds.   Neurological:      General: No focal deficit present.      Mental Status: He is oriented to person, place, and time.      Comments: Moving all extremities.  Sensation is intact and equal bilaterally.  Strength is 5 out of 5   Psychiatric:         Mood and Affect: Mood normal.           EKG/LABS  Results for orders placed or performed during the hospital encounter of 08/22/24   CBC with Differential   Result Value Ref Range    WBC 11.8 (H) 4.8 - 10.8 K/uL    RBC 5.67 4.70 - 6.10 M/uL    Hemoglobin 17.8 14.0 - 18.0 g/dL    Hematocrit 52.5 (H) 42.0 - 52.0 %    MCV 92.6 81.4 - 97.8 fL    MCH 31.4 27.0 - 33.0 pg    MCHC 33.9 32.3 - 36.5 g/dL    RDW 46.2 35.9 - 50.0 fL    Platelet Count 254 164 - 446 K/uL    MPV 10.1 9.0 - 12.9 fL    Neutrophils-Polys 55.20 44.00 - 72.00 %    Lymphocytes 31.00 22.00 - 41.00 %    Monocytes 11.90 0.00 - 13.40 %    Eosinophils 0.70 0.00 - 6.90 %    Basophils 0.80 0.00 - 1.80 %    Immature Granulocytes  0.40 0.00 - 0.90 %    Nucleated RBC 0.00 0.00 - 0.20 /100 WBC    Neutrophils (Absolute) 6.54 1.82 - 7.42 K/uL    Lymphs (Absolute) 3.67 1.00 - 4.80 K/uL    Monos (Absolute) 1.41 (H) 0.00 - 0.85 K/uL    Eos (Absolute) 0.08 0.00 - 0.51 K/uL    Baso (Absolute) 0.09 0.00 - 0.12 K/uL    Immature Granulocytes (abs) 0.05 0.00 - 0.11 K/uL    NRBC (Absolute) 0.00 K/uL   Comp Metabolic Panel   Result Value Ref Range    Sodium 133 (L) 135 - 145 mmol/L    Potassium 3.4 (L) 3.6 - 5.5 mmol/L    Chloride 97 96 - 112 mmol/L    Co2 21 20 - 33 mmol/L    Anion Gap 15.0 7.0 - 16.0    Glucose 226 (H) 65 - 99 mg/dL    Bun 32 (H) 8 - 22 mg/dL    Creatinine 1.22 0.50 - 1.40 mg/dL    Calcium 9.6 8.5 - 10.5 mg/dL    Correct Calcium 9.4 8.5 - 10.5 mg/dL    AST(SGOT) 36 12 - 45 U/L    ALT(SGPT) 22 2 - 50 U/L    Alkaline Phosphatase 83 30 - 99 U/L    Total Bilirubin 4.6 (H) 0.1 - 1.5 mg/dL    Albumin 4.2 3.2 - 4.9 g/dL    Total Protein 6.5 6.0 - 8.2 g/dL    Globulin 2.3 1.9 - 3.5 g/dL    A-G Ratio 1.8 g/dL   Diagnostic Alcohol   Result Value Ref Range    Diagnostic Alcohol <10.1 <10.1 mg/dL   LACTIC ACID   Result Value Ref Range    Lactic Acid 1.5 0.5 - 2.0 mmol/L   ESTIMATED GFR   Result Value Ref Range    GFR (CKD-EPI) 62 >60 mL/min/1.73 m 2   EKG   Result Value Ref Range    Report       [facility] Emergency Dept.    Test Date:  2024  Pt Name:    AMADO BEDOLLA                   Department: ER  MRN:        [id number]                      Room:       RD 02  Gender:     Male                         Technician: [id number]  :        1950                   Requested By:ER TRIAGE PROTOCOL  Order #:    [id number]                    Reading MD:    Measurements  Intervals                                Axis  Rate:       70                           P:          0  CO:         0                            QRS:        246  QRSD:       163                          T:          69  QT:         486  QTc:        525    Interpretive  Statements  Atrial flutter with predominant 3:1 AV block  Nonspecific IVCD with LAD  Lateral infarct, old  Anterior infarct, possibly acute  Artifact in lead(s) I,II,aVR,aVL,aVF,V1,V2,V3,V4,V5,V6  Compared to ECG 2024 20:31:20  AV block, advanced (high-grade) now present  Intraventricular conduction delay now present  Myocardial i nfarct finding now present  Left bundle-branch block no longer present     EKG   Result Value Ref Range    Report       [facility] Emergency Dept.    Test Date:  2024  Pt Name:    AMADO BEDOLLA                   Department: ER  MRN:        [id number]                      Room:        02  Gender:     Male                         Technician: [id number]  :        1950                   Requested By:ER TRIAGE PROTOCOL  Order #:    [id number]                    Reading MD: Marion Castle MD    Measurements  Intervals                                Axis  Rate:       70                           P:          254  ND:         186                          QRS:        -76  QRSD:       173                          T:          143  QT:         471  QTc:        509    Interpretive Statements  LEFT BUNDLE BRANCH BLOCK with aflutter, unchanged from   Electronically Signed On 2024 08:15:31 PDT by Marion Castle MD         I have independently interpreted this EKG    RADIOLOGY/PROCEDURES   I have independently interpreted the diagnostic imaging associated with this visit and am waiting the final reading from the radiologist.   My preliminary interpretation is as follows: Significant stool burden, no evidence of dissection or aneurysm    Radiologist interpretation:  CT-CTA COMPLETE THORACOABDOMINAL AORTA   Final Result         1. No acute aortic abnormality.   2. Small pericardial effusion.   3. Coronary artery calcifications.   4. Simple left renal cyst.   5. Colonic diverticulosis.   6. Dilated common bile duct, likely reservoir effect from previous cholecystectomy.  Correlate with LFTs in regards to significance.         CT-HEAD W/O   Final Result      No acute process.               DX-CHEST-PORTABLE (1 VIEW)   Final Result      No acute process.            COURSE & MEDICAL DECISION MAKING    ASSESSMENT, COURSE AND PLAN  Care Narrative: Patient here with acute onset low back pain radiating into his pelvis and abdomen.  He does have some abdominal tenderness.  Patient initially hypertensive upon presentation at 180 but repeat pressures arriving to room are 140.  Patient is very uncomfortable.  Patient is neurovascularly intact.  He is not altered on my history.  Patient's pressure is improved despite any intervention here.  Patient will go directly to CT to evaluate for possible surgical emergency including ruptured AAA.  Will check CT head as well given the report of confusion though patient is lucid at this point.  Thankfully patient's CTs are both unremarkable and very reassuring.  He has some minimal chronic dilatation of his common bile duct but is status post cholecystectomy which would be consistent with a mild elevation here.  Patient bilirubin is chronically elevated.  Patient with significant stool burden identified on CT which would explain his pain here.  Patient never had any right upper quadrant pain or upper quadrant pain to suggest referred pain from the choledocholithiasis and my suspicion of this is low.  I gave an enema.  Following this patient had large bowel movement and pain entirely resolved.  Patient reassessed multiple times without any ongoing pain.  He had another bowel movement which was normal.  Patient denies any ongoing back pain or leg pain.  Given the fact the patient's pain has entirely resolved my suspicion of a spinal claudication or compressing lesion is exceedingly low here given the patient's pain resolved after his bowel movement.  Patient ambulated around the emergency department without assistance.  He is very thankful.  Patient to  follow-up regarding his elevated bilirubin with his primary care provider, this does appear to be a chronic issue.  Return precautions reviewed in depth            DISPOSITION AND DISCUSSIONS      FINAL DIAGNOSIS  1. Acute abdominal pain    2. Constipation, unspecified constipation type    3. Hyperbilirubinemia

## 2024-08-22 NOTE — DISCHARGE INSTRUCTIONS
Your CT today was reassuring, it did not show any problems with your aorta or any major surgical problems.  He did have a significant of stool in your colon consistent with severe constipation.  I have started you on a bowel regimen for this, take the senna docusate daily, I would also like you to take MiraLAX, 1 capful daily.  You have mildly elevated bilirubin levels, I would like you to follow-up with your primary care provider for this; this is a relatively chronic issue for you.  If your symptoms worsen significantly return to the emergency department

## 2024-08-22 NOTE — ED NOTES
Bedside report received from off going RN/tech: Soraya, assumed care of patient.  POC discussed with patient. Call light within reach, all needs addressed at this time.       Fall risk interventions in place: Patient's personal possessions are with in their safe reach, Place fall risk sign on patient's door, Keep floor surfaces clean and dry, and Bed Alarm in use (all applicable per [place] Fall risk assessment)   Continuous monitoring: Cardiac Leads, Pulse Ox, or Blood Pressure  IVF/IV medications: Not Applicable   Oxygen: How many liters 2L  Bedside sitter: Not Applicable   Isolation: Not Applicable         No Yes

## 2024-08-22 NOTE — ED NOTES
Patient assessed for bowel movement. Unable to have the bowel movement post intervention. ERP notified.

## 2025-02-25 ENCOUNTER — TELEPHONE (OUTPATIENT)
Dept: HEALTH INFORMATION MANAGEMENT | Facility: OTHER | Age: 75
End: 2025-02-25
Payer: COMMERCIAL

## 2025-03-21 ENCOUNTER — APPOINTMENT (OUTPATIENT)
Dept: RADIOLOGY | Facility: MEDICAL CENTER | Age: 75
End: 2025-03-21
Attending: NURSE PRACTITIONER
Payer: COMMERCIAL

## 2025-06-02 ENCOUNTER — APPOINTMENT (OUTPATIENT)
Dept: RADIOLOGY | Facility: MEDICAL CENTER | Age: 75
End: 2025-06-02
Attending: NURSE PRACTITIONER
Payer: COMMERCIAL

## (undated) DEVICE — CHLORAPREP 26 ML APPLICATOR - ORANGE TINT(25/CA)

## (undated) DEVICE — GOWN WARMING STANDARD FLEX - (30/CA)

## (undated) DEVICE — SUCTION INSTRUMENT YANKAUER BULBOUS TIP W/O VENT (50EA/CA)

## (undated) DEVICE — CANISTER SUCTION 3000ML MECHANICAL FILTER AUTO SHUTOFF MEDI-VAC NONSTERILE LF DISP  (40EA/CA)

## (undated) DEVICE — COVER LIGHT HANDLE ALC PLUS DISP (18EA/BX)

## (undated) DEVICE — TOWELS CLOTH SURGICAL - (4/PK 20PK/CA)

## (undated) DEVICE — SENSOR OXIMETER ADULT SPO2 RD SET (20EA/BX)

## (undated) DEVICE — LACTATED RINGERS INJ 1000 ML - (14EA/CA 60CA/PF)

## (undated) DEVICE — TUBING CLEARLINK DUO-VENT - C-FLO (48EA/CA)

## (undated) DEVICE — CONNECTOR 5-IN-1 STERILE - (25EA/BX)

## (undated) DEVICE — BLADE SAGITTAL SAW DUAL CUT 75.0 X 25.0MM (1/EA)

## (undated) DEVICE — SODIUM CHL IRRIGATION 0.9% 1000ML (12EA/CA)

## (undated) DEVICE — PACK TOTAL HIP - (1/CA)

## (undated) DEVICE — GLOVE BIOGEL SZ 7.5 SURGICAL PF LTX - (50PR/BX 4BX/CA)

## (undated) DEVICE — SLEEVE, VASO, THIGH, MED

## (undated) DEVICE — STAPLER SKIN DISP - (6/BX 10BX/CA) VISISTAT

## (undated) DEVICE — ELECTRODE DUAL RETURN W/ CORD - (50/PK)

## (undated) DEVICE — MIXER BONE CEMENT REVOLUTION - W/FEMORAL PRESSURIZER (6/CA)

## (undated) DEVICE — SUTURE GENERAL

## (undated) DEVICE — Device

## (undated) DEVICE — GLOVE BIOGEL INDICATOR SZ 8 SURGICAL PF LTX - (50/BX 4BX/CA)

## (undated) DEVICE — SET LEADWIRE 5 LEAD BEDSIDE DISPOSABLE ECG (1SET OF 5/EA)

## (undated) DEVICE — TOWEL STOP TIMEOUT SAFETY FLAG (40EA/CA)

## (undated) DEVICE — SUTURE 2-0 VICRYL PLUS CT-1 - 8 X 18 INCH(12/BX)

## (undated) DEVICE — SUTURE 5 ETHIBOND V-37 (12PK/BX)

## (undated) DEVICE — SET EXTENSION WITH 2 PORTS (48EA/CA) ***PART #2C8610 IS A SUBSTITUTE*****

## (undated) DEVICE — DRAPE STRLE REG TOWEL 18X24 - (10/BX 4BX/CA)"